# Patient Record
Sex: MALE | Race: WHITE | NOT HISPANIC OR LATINO | Employment: OTHER | URBAN - METROPOLITAN AREA
[De-identification: names, ages, dates, MRNs, and addresses within clinical notes are randomized per-mention and may not be internally consistent; named-entity substitution may affect disease eponyms.]

---

## 2017-03-31 ENCOUNTER — ALLSCRIPTS OFFICE VISIT (OUTPATIENT)
Dept: OTHER | Facility: OTHER | Age: 77
End: 2017-03-31

## 2017-10-06 ENCOUNTER — ALLSCRIPTS OFFICE VISIT (OUTPATIENT)
Dept: OTHER | Facility: OTHER | Age: 77
End: 2017-10-06

## 2017-10-07 NOTE — PROGRESS NOTES
Assessment  1  History of malignant melanoma of skin (V10 82) (Z85 820)    Plan  PMH: Malignant melanoma of upper limb    · Follow-up visit in 6 months Evaluation and Treatment  Follow-up  Status: Complete   Done: 56NIH8886   Ordered; For: PMH: Malignant melanoma of upper limb; Ordered By: Amy Dears Performed:  Due: 41TFQ3667; Last Updated By: Ki Contreras; 10/6/2017 9:28:28 AM    Discussion/Summary  Discussion Summary:   Patient is a 18-year-old male who presents today for a 6 month follow-up for a stage IIa ( pT3a, pN0) melanoma of the left shoulder diagnosed in January 2014  At that time he underwent a wide excision and sentinel node biopsy  We will see the patient back in 6 months or sooner if the need arises  Counseling Documentation With Imm: The patient was counseled regarding instructions for management,-impressions,-importance of compliance with treatment  Goals and Barriers: The patient has the current Goals:   Patient's Capacity to Self-Care: Patient is able to Self-Care  Medication SE Review and Pt Understands Tx: The treatment plan was reviewed with the patient/guardian  The patient/guardian understands and agrees with the treatment plan      Chief Complaint  Chief Complaint Free Text Note Form: Pt is here for his 6 month Melanoma follow-up states that where the Melanoma is located he notice pain and a scab  History of Present Illness  Diagnosis and Staging: Left shoulder melanoma stage II   Treatment History: wide excision/SLN bx Jan 2014   Current Therapy: Observation   Interval History: Patient presents today for a 6 month follow-up for left shoulder melanoma diagnosed in January 2014  He is a 18-year-old man with a previous stage II melanoma of the left shoulder      Review of Systems  Complete Male ROS Surg Onc:   Constitutional: The patient denies new or recent history of general fatigue, no recent weight loss, no change in appetite     Eyes: No complaints of visual problems, no scleral icterus  ENT: no complaints of ear pain, no hoarseness, no difficulty swallowing,-no tinnitus-and-no new masses in head, oral cavity, or neck  Cardiovascular: No complaints of chest pain, no palpitations, no ankle edema  Respiratory: shortness of breath-and-shortness of breath during exertion, but-No complaints of shortness of breath, no cough,-no cough-and-no wheezing  Gastrointestinal: No complaints of jaundice, no bloody stools, no pale stools  Genitourinary: No complaints of dysuria, no hematuria, no nocturia, no frequent urination, no urethral discharge  Musculoskeletal: No complaints of weakness, paralysis, joint stiffness or arthralgias,  Integumentary: No complaints of rash, no new lesions  Neurological: No complaints of convulsions, no seizures, no dizziness  Hematologic/Lymphatic: No complaints of easy bruising  Other Symptoms: pain and a scab at his Melanoma site  ROS Reviewed:   ROS reviewed  Active Problems  1  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)   2  Dyspnea and respiratory abnormality (786 09) (R06 00,R06 89)   3  Encounter for other plastic or reconstructive surgery following medical procedure or   healed injury (V51 8) (Z42 8)    Past Medical History  1  Encounter for other plastic or reconstructive surgery following medical procedure or   healed injury (V51 8) (Z42 8)   2  History of chronic sinusitis (V12 69) (Z87 09)   3  History of glaucoma (V12 49) (Z86 69)   4  History of kidney disease (V13 09) (Z87 448)   5  History of shortness of breath (V13 89) (Z87 898)   6  History of Malignant melanoma of upper limb (172 6) (C43 60)   7  History of Pulmonary Symptoms (786 9)   8  History of Renal cyst, acquired (593 2) (N28 1)   9  History of Tiring Easily    Surgical History  1  History of Biopsy Skin   2  History of Cataract Surgery   3  History of Excision Of Lesion Shoulders   · 01/14/14   4  History of Inguinal Hernia Repair   5  History of Removal Of Lesion   6  History of Repair Of Retinal Detachment   7  History of Rotator Cuff Repair   8  History of [place] Lymph Node Biopsy   · 01/14/14, left neck and axilla  Surgical History Reviewed: The surgical history was reviewed and updated today  Family History  Sister    1  Family history of chronic obstructive pulmonary disease (V17 6) (Z82 5)  Family History Reviewed: The family history was reviewed and updated today  Social History   · Being A Social Drinker   · Former smoker (J35 99) (W75 376)   · Occupation: Retired  Social History Reviewed: The social history was reviewed and updated today  The social history was reviewed and is unchanged  Current Meds   1  Aspir-Low 81 MG Oral Tablet Delayed Release; TAKE 1 TABLET DAILY; Therapy: (Recorded:16Mar2015) to Recorded   2  [employer] Vitamin D 2000 UNIT CAPS; take 1 capsule daily; Therapy: (Recorded:16Mar2015) to Recorded   3  Protonix 40 MG Oral Packet; TAKE 1 MG Daily; Therapy: (Recorded:16Mar2015) to Recorded  Medication List Reviewed: The medication list was reviewed and updated today  Allergies  1  Biaxin XL TB24    Vitals  Vital Signs    Recorded: 91YHA4599 09:10AM   Temperature 98 4 F   Heart Rate 78   Respiration 15   Systolic 546   Diastolic 78   Height 6 ft    Weight 223 lb    BMI Calculated 30 24   BSA Calculated 2 23   O2 Saturation 96     Physical Exam    Constitutional: General appearance: The Patient is well-developed, well-nourished male who appears his stated age in no acute distress  He is pleasant and talkative  HEENT: Sclerae are anicteric  -Mucous membranes are moist  -Neck is supple without adenopathy  No JVD  Chest: The lungs are clear to auscultation  Cardiac: Heart is regular rate  Abdomen: Abdomen is soft, nontender without masses  Extremities: There is no clubbing or cyanosis  -There is no edema  Neuro: Grossly nonfocal  -Gait is normal     Lymphatic: no evidence of cervical adenopathy bilaterally   -no evidence of axillary adenopathy bilaterally  Skin: Warm, anicteric  Additional Exam:  Left shoulder melanoma site well-healed without evidence of local or regional reoccurrence  No palpable left neck or left axillary lymphadenopathy noted  There is a small scab noted posterior to the incision site, which does not appear worrisome  End of Encounter Meds  1  Aspir-Low 81 MG Oral Tablet Delayed Release; TAKE 1 TABLET DAILY; Therapy: (Recorded:16Mar2015) to Recorded   2  CVS Vitamin D 2000 UNIT CAPS; take 1 capsule daily; Therapy: (Recorded:16Mar2015) to Recorded   3  Protonix 40 MG Oral Packet; TAKE 1 MG Daily;    Therapy: (Recorded:16Mar2015) to Recorded    Signatures   Electronically signed by : Kiley Dobson MD; Oct  6 2017  9:30AM EST                       (Author)

## 2018-01-13 VITALS
SYSTOLIC BLOOD PRESSURE: 128 MMHG | BODY MASS INDEX: 30.75 KG/M2 | WEIGHT: 227 LBS | OXYGEN SATURATION: 94 % | TEMPERATURE: 97.7 F | DIASTOLIC BLOOD PRESSURE: 80 MMHG | HEIGHT: 72 IN | RESPIRATION RATE: 14 BRPM | HEART RATE: 63 BPM

## 2018-01-14 VITALS
RESPIRATION RATE: 15 BRPM | OXYGEN SATURATION: 96 % | WEIGHT: 223 LBS | SYSTOLIC BLOOD PRESSURE: 122 MMHG | BODY MASS INDEX: 30.2 KG/M2 | DIASTOLIC BLOOD PRESSURE: 78 MMHG | HEART RATE: 78 BPM | TEMPERATURE: 98.4 F | HEIGHT: 72 IN

## 2018-04-06 ENCOUNTER — OFFICE VISIT (OUTPATIENT)
Dept: SURGICAL ONCOLOGY | Facility: CLINIC | Age: 78
End: 2018-04-06
Payer: COMMERCIAL

## 2018-04-06 VITALS
SYSTOLIC BLOOD PRESSURE: 120 MMHG | HEIGHT: 73 IN | DIASTOLIC BLOOD PRESSURE: 60 MMHG | WEIGHT: 218 LBS | TEMPERATURE: 98.5 F | HEART RATE: 60 BPM | RESPIRATION RATE: 14 BRPM | BODY MASS INDEX: 28.89 KG/M2

## 2018-04-06 DIAGNOSIS — C43.62 MALIGNANT MELANOMA OF LEFT SHOULDER (HCC): Primary | ICD-10-CM

## 2018-04-06 PROCEDURE — 99214 OFFICE O/P EST MOD 30 MIN: CPT | Performed by: SURGERY

## 2018-04-06 RX ORDER — RANITIDINE 150 MG/1
150 TABLET ORAL DAILY
COMMUNITY
End: 2020-12-02

## 2018-04-06 RX ORDER — ASPIRIN 81 MG/1
1 TABLET ORAL DAILY
COMMUNITY

## 2018-04-06 RX ORDER — PANTOPRAZOLE SODIUM 40 MG/1
1 GRANULE, DELAYED RELEASE ORAL DAILY
COMMUNITY
End: 2018-10-12 | Stop reason: HOSPADM

## 2018-04-06 RX ORDER — LEVOTHYROXINE SODIUM 0.07 MG/1
1 TABLET ORAL DAILY
COMMUNITY
Start: 2018-04-02

## 2018-04-06 NOTE — PROGRESS NOTES
Surgical Oncology Follow Up       [facility] SURGICAL ONCOLOGY [address] [address]    Sam Mitchell  1940  [id number]  [facility] SURGICAL ONCOLOGY [address] [address]    Chief Complaint   Patient presents with    Follow-up     6 MOS MELONOMA, PT has not issue at this time       Assessment/Plan:    No problem-specific Assessment & Plan notes found for this encounter  There are no diagnoses linked to this encounter  No history exists  History of Present Illness: Diagnosis and Staging: Left shoulder melanoma stage II   Treatment History: wide excision/SLN bx Jan 2014   Current Therapy: Observation     Interval History: Patient presents today for a 6 month follow-up for left shoulder melanoma diagnosed in January 2014  He is a 78-year-old man with a previous stage II melanoma of the left shoulder       Review of Systems:  Review of Systems   Constitutional: Negative  HENT: Negative  Eyes: Negative  Respiratory: Negative  Cardiovascular: Negative  Gastrointestinal: Negative  Endocrine: Negative  Genitourinary: Negative  Musculoskeletal: Negative  Allergic/Immunologic: Negative  Neurological: Negative  Hematological: Negative  Psychiatric/Behavioral: Negative  All other systems reviewed and are negative  There is no problem list on file for this patient  No past medical history on file  No past surgical history on file  No family history on file  Social History     Social History    Marital status: /Civil Union     Spouse name: N/A    Number of children: N/A    Years of education: N/A     Occupational History    Not on file       Social History Main Topics    Smoking status: Never Smoker    Smokeless tobacco: Never Used    Alcohol use Yes    Drug use: No    Sexual activity: Yes     Other Topics Concern    Not on file     Social History Narrative    No narrative on file       Current Outpatient Prescriptions:     ranitidine (ZANTAC) 150 mg tablet, Take 150 mg by mouth daily, Disp: , Rfl:     aspirin (ASPIR-LOW) 81 mg EC tablet, Take 1 tablet by mouth daily, Disp: , Rfl:     Cholecalciferol (CVS VITAMIN D) 2000 units CAPS, Take 1 capsule by mouth daily, Disp: , Rfl:     levothyroxine 75 mcg tablet, Take 1 tablet by mouth daily, Disp: , Rfl:     pantoprazole (PROTONIX) 40 mg, Take 1 mg by mouth daily, Disp: , Rfl:   Allergies   Allergen Reactions    Clarithromycin      Vitals:    04/06/18 0910   BP: 120/60   Pulse: 60   Resp: 14   Temp: 98 5 °F (36 9 °C)       Physical Exam   Constitutional: He is oriented to person, place, and time  He appears well-developed and well-nourished  Eyes: Conjunctivae and EOM are normal  Pupils are equal, round, and reactive to light  Neck: Normal range of motion  Neck supple  Pulmonary/Chest: Effort normal and breath sounds normal    Abdominal: Soft  Bowel sounds are normal    Musculoskeletal: Normal range of motion  Neurological: He is alert and oriented to person, place, and time  Skin: Skin is warm and dry  Left shoulder excision site well healed without evidence of recurrence  Cervical and axillary nodes negative  Psychiatric: He has a normal mood and affect  His behavior is normal  Judgment and thought content normal          Results:  Labs:  CBC, Coags, BMP, Mg, Phos     Imaging  No results found  I reviewed the above laboratory and imaging data  Discussion/Summary:  History of stage I melanoma, left shoulder, 4 years post resection  No evidence of disease recurrence  Follow-up in 6 months for surveillance per guidelines

## 2018-04-06 NOTE — LETTER
April 6, 2018     Eladia Villatoro MD  [address] [place] [address] 8[facility] [address]    Patient: Aaron Gallego   YOB: 1940   Date of Visit: 4/6/2018       Dear Dr Jethro Denver: Thank you for referring Mason Marcos to me for evaluation  Below are my notes for this consultation  If you have questions, please do not hesitate to call me  I look forward to following your patient along with you  Sincerely,        Emily Hoyt MD        CC: MD Roger Mathis DO Gilmer Proffer, MD Janas Lama, MD  4/6/2018  9:30 AM  Sign at close encounter     Surgical Oncology Follow Up       [address] [facility] SURGICAL ONCOLOGY [address]  1940  [id number]  [facility] SURGICAL ONCOLOGY [address] [address]    Chief Complaint   Patient presents with    Follow-up     6 MOS MELONOMA, PT has not issue at this time       Assessment/Plan:    No problem-specific Assessment & Plan notes found for this encounter  There are no diagnoses linked to this encounter  Advance Care Planning/Advance Directives:  Discussed and Did not discuss disease status, cancer treatment plans and/or cancer treatment goals and *** with the patient  No history exists  History of Present Illness: Diagnosis and Staging: Left shoulder melanoma stage II   Treatment History: wide excision/SLN bx Jan 2014   Current Therapy: Observation     Interval History: Patient presents today for a 6 month follow-up for left shoulder melanoma diagnosed in January 2014  He is a 60-year-old man with a previous stage II melanoma of the left shoulder       Review of Systems:  Review of Systems   Constitutional: Negative  HENT: Negative  Eyes: Negative  Respiratory: Negative  Cardiovascular: Negative  Gastrointestinal: Negative  Endocrine: Negative      Genitourinary: Negative  Musculoskeletal: Negative  Allergic/Immunologic: Negative  Neurological: Negative  Hematological: Negative  Psychiatric/Behavioral: Negative  All other systems reviewed and are negative  There is no problem list on file for this patient  No past medical history on file  No past surgical history on file  No family history on file  Social History     Social History    Marital status: /Civil Union     Spouse name: N/A    Number of children: N/A    Years of education: N/A     Occupational History    Not on file  Social History Main Topics    Smoking status: Never Smoker    Smokeless tobacco: Never Used    Alcohol use Yes    Drug use: No    Sexual activity: Yes     Other Topics Concern    Not on file     Social History Narrative    No narrative on file       Current Outpatient Prescriptions:     ranitidine (ZANTAC) 150 mg tablet, Take 150 mg by mouth daily, Disp: , Rfl:     aspirin (ASPIR-LOW) 81 mg EC tablet, Take 1 tablet by mouth daily, Disp: , Rfl:     Cholecalciferol (CVS VITAMIN D) 2000 units CAPS, Take 1 capsule by mouth daily, Disp: , Rfl:     levothyroxine 75 mcg tablet, Take 1 tablet by mouth daily, Disp: , Rfl:     pantoprazole (PROTONIX) 40 mg, Take 1 mg by mouth daily, Disp: , Rfl:   Allergies   Allergen Reactions    Clarithromycin      Vitals:    04/06/18 0910   BP: 120/60   Pulse: 60   Resp: 14   Temp: 98 5 °F (36 9 °C)       Physical Exam   Constitutional: He is oriented to person, place, and time  He appears well-developed and well-nourished  Eyes: Conjunctivae and EOM are normal  Pupils are equal, round, and reactive to light  Neck: Normal range of motion  Neck supple  Pulmonary/Chest: Effort normal and breath sounds normal    Abdominal: Soft  Bowel sounds are normal    Musculoskeletal: Normal range of motion  Neurological: He is alert and oriented to person, place, and time  Skin: Skin is warm and dry     Left shoulder excision site well healed without evidence of recurrence  Cervical and axillary nodes negative  Psychiatric: He has a normal mood and affect  His behavior is normal  Judgment and thought content normal          Results:  Labs:  CBC, Coags, BMP, Mg, Phos     Imaging  No results found  I reviewed the above laboratory and imaging data  Discussion/Summary:  History of stage I melanoma, left shoulder, 4 years post resection  No evidence of disease recurrence  Follow-up in 6 months for surveillance per guidelines

## 2018-04-06 NOTE — LETTER
April 6, 2018     Renita Rodríguez MD  [address] [place] [address]    Patient: Aki Vee   YOB: 1940   Date of Visit: 4/6/2018       Dear Dr Namrata Loco: Thank you for referring Deangelo Joe to me for evaluation  Below are my notes for this consultation  If you have questions, please do not hesitate to call me  I look forward to following your patient along with you  Sincerely,        Nasim Chase MD        CC: MD Chelsi Ross, MD Nasim Renee MD  4/6/2018  9:31 AM  Sign at close encounter     Surgical Oncology Follow Up       [address]  1940  [id number]  [facility]  [facility] [place]  [address] [id number]    Chief Complaint   Patient presents with    Follow-up     6 MOS MELONOMA, PT has not issue at this time       Assessment/Plan:    No problem-specific Assessment & Plan notes found for this encounter  There are no diagnoses linked to this encounter  No history exists  History of Present Illness: Diagnosis and Staging: Left shoulder melanoma stage II   Treatment History: wide excision/SLN bx Jan 2014   Current Therapy: Observation     Interval History: Patient presents today for a 6 month follow-up for left shoulder melanoma diagnosed in January 2014  He is a 60-year-old man with a previous stage II melanoma of the left shoulder       Review of Systems:  Review of Systems   Constitutional: Negative  HENT: Negative  Eyes: Negative  Respiratory: Negative  Cardiovascular: Negative  Gastrointestinal: Negative  Endocrine: Negative  Genitourinary: Negative  Musculoskeletal: Negative  Allergic/Immunologic: Negative  Neurological: Negative  Hematological: Negative  Psychiatric/Behavioral: Negative      All other systems reviewed and are negative  There is no problem list on file for this patient  No past medical history on file  No past surgical history on file  No family history on file  Social History     Social History    Marital status: /Civil Union     Spouse name: N/A    Number of children: N/A    Years of education: N/A     Occupational History    Not on file  Social History Main Topics    Smoking status: Never Smoker    Smokeless tobacco: Never Used    Alcohol use Yes    Drug use: No    Sexual activity: Yes     Other Topics Concern    Not on file     Social History Narrative    No narrative on file       Current Outpatient Prescriptions:     ranitidine (ZANTAC) 150 mg tablet, Take 150 mg by mouth daily, Disp: , Rfl:     aspirin (ASPIR-LOW) 81 mg EC tablet, Take 1 tablet by mouth daily, Disp: , Rfl:     Cholecalciferol (CVS VITAMIN D) 2000 units CAPS, Take 1 capsule by mouth daily, Disp: , Rfl:     levothyroxine 75 mcg tablet, Take 1 tablet by mouth daily, Disp: , Rfl:     pantoprazole (PROTONIX) 40 mg, Take 1 mg by mouth daily, Disp: , Rfl:   Allergies   Allergen Reactions    Clarithromycin      Vitals:    04/06/18 0910   BP: 120/60   Pulse: 60   Resp: 14   Temp: 98 5 °F (36 9 °C)       Physical Exam   Constitutional: He is oriented to person, place, and time  He appears well-developed and well-nourished  Eyes: Conjunctivae and EOM are normal  Pupils are equal, round, and reactive to light  Neck: Normal range of motion  Neck supple  Pulmonary/Chest: Effort normal and breath sounds normal    Abdominal: Soft  Bowel sounds are normal    Musculoskeletal: Normal range of motion  Neurological: He is alert and oriented to person, place, and time  Skin: Skin is warm and dry  Left shoulder excision site well healed without evidence of recurrence  Cervical and axillary nodes negative  Psychiatric: He has a normal mood and affect   His behavior is normal  Judgment and thought content normal          Results:  Labs:  CBC, Coags, BMP, Mg, Phos     Imaging  No results found  I reviewed the above laboratory and imaging data  Discussion/Summary:  History of stage I melanoma, left shoulder, 4 years post resection  No evidence of disease recurrence  Follow-up in 6 months for surveillance per guidelines

## 2018-05-29 LAB
BACTERIA UR QL AUTO: ABNORMAL /HPF
BILIRUB UR QL STRIP: NEGATIVE
CLARITY UR: CLEAR
COLOR UR: YELLOW
GLUCOSE UR STRIP-MCNC: NEGATIVE MG/DL
HGB UR QL STRIP.AUTO: NEGATIVE
KETONES UR STRIP-MCNC: NEGATIVE MG/DL
LEUKOCYTE ESTERASE UR QL STRIP: ABNORMAL
MUCUS THREADS (HISTORICAL): ABNORMAL
NITRITE UR QL STRIP: NEGATIVE
NON-SQ EPI CELLS URNS QL MICRO: ABNORMAL /LPF
PH UR STRIP.AUTO: 6 [PH] (ref 4.5–8)
PROSTATE SPECIFIC ANTIGEN FREE (HISTORICAL): 1.46 NG/ML (ref 0.2–4.9)
PROSTATE SPECIFIC ANTIGEN PERCENT FREE (HISTORICAL): 24.8 %
PROT UR STRIP-MCNC: NEGATIVE MG/DL
PSA (HISTORICAL): 5.9 NG/ML (ref 0–4)
RBC #/AREA URNS AUTO: ABNORMAL /HPF
SP GR UR STRIP.AUTO: 1.01 (ref 1–1.03)
UROBILINOGEN UR QL STRIP.AUTO: 0.2 EU/DL (ref 0.2–8)
WBC #/AREA URNS AUTO: ABNORMAL /HPF

## 2018-10-11 ENCOUNTER — TRANSCRIBE ORDERS (OUTPATIENT)
Dept: ADMINISTRATIVE | Facility: HOSPITAL | Age: 78
End: 2018-10-11

## 2018-10-11 ENCOUNTER — APPOINTMENT (OUTPATIENT)
Dept: LAB | Facility: HOSPITAL | Age: 78
End: 2018-10-11
Attending: INTERNAL MEDICINE
Payer: COMMERCIAL

## 2018-10-11 DIAGNOSIS — E78.5 HYPERLIPIDEMIA, UNSPECIFIED HYPERLIPIDEMIA TYPE: ICD-10-CM

## 2018-10-11 DIAGNOSIS — E55.9 MILD VITAMIN D DEFICIENCY: ICD-10-CM

## 2018-10-11 DIAGNOSIS — R53.83 FATIGUE, UNSPECIFIED TYPE: ICD-10-CM

## 2018-10-11 DIAGNOSIS — Z79.899 LONG TERM USE OF DRUG: ICD-10-CM

## 2018-10-11 DIAGNOSIS — D64.9 ANEMIA, UNSPECIFIED TYPE: ICD-10-CM

## 2018-10-11 DIAGNOSIS — E03.9 HYPOTHYROIDISM, UNSPECIFIED TYPE: ICD-10-CM

## 2018-10-11 DIAGNOSIS — R53.83 FATIGUE, UNSPECIFIED TYPE: Primary | ICD-10-CM

## 2018-10-11 LAB
25(OH)D3 SERPL-MCNC: 40.4 NG/ML (ref 30–100)
ALBUMIN SERPL BCP-MCNC: 3.9 G/DL (ref 3.5–5.7)
ALP SERPL-CCNC: 63 U/L (ref 55–165)
ALT SERPL W P-5'-P-CCNC: 14 U/L (ref 7–52)
ANION GAP SERPL CALCULATED.3IONS-SCNC: 8 MMOL/L (ref 4–13)
AST SERPL W P-5'-P-CCNC: 14 U/L (ref 13–39)
BILIRUB SERPL-MCNC: 0.6 MG/DL (ref 0.2–1)
BUN SERPL-MCNC: 23 MG/DL (ref 7–25)
CALCIUM SERPL-MCNC: 9 MG/DL (ref 8.6–10.5)
CHLORIDE SERPL-SCNC: 105 MMOL/L (ref 98–107)
CO2 SERPL-SCNC: 30 MMOL/L (ref 21–31)
CREAT SERPL-MCNC: 1.23 MG/DL (ref 0.7–1.3)
ERYTHROCYTE [DISTWIDTH] IN BLOOD BY AUTOMATED COUNT: 13.4 % (ref 11.6–15.1)
FERRITIN SERPL-MCNC: 120 NG/ML (ref 8–388)
FOLATE SERPL-MCNC: 10.5 NG/ML (ref 3.1–17.5)
GFR SERPL CREATININE-BSD FRML MDRD: 56 ML/MIN/1.73SQ M
GLUCOSE P FAST SERPL-MCNC: 93 MG/DL (ref 65–99)
HCT VFR BLD AUTO: 42.7 % (ref 42–52)
HGB BLD-MCNC: 14.4 G/DL (ref 12–17)
IRON SATN MFR SERPL: 37 %
IRON SERPL-MCNC: 89 UG/DL (ref 65–175)
LDLC SERPL DIRECT ASSAY-MCNC: 107 MG/DL (ref 75–193)
MCH RBC QN AUTO: 30.3 PG (ref 26.8–34.3)
MCHC RBC AUTO-ENTMCNC: 33.7 G/DL (ref 31.4–37.4)
MCV RBC AUTO: 90 FL (ref 82–98)
PLATELET # BLD AUTO: 187 THOUSANDS/UL (ref 149–390)
PMV BLD AUTO: 8.3 FL (ref 8.9–12.7)
POTASSIUM SERPL-SCNC: 4.4 MMOL/L (ref 3.5–5.5)
PROT SERPL-MCNC: 6.6 G/DL (ref 6.4–8.9)
RBC # BLD AUTO: 4.75 MILLION/UL (ref 3.88–5.62)
RETICS # AUTO: NORMAL 10*3/UL (ref 14356–105094)
RETICS # CALC: 1.07 % (ref 0.37–1.87)
SODIUM SERPL-SCNC: 143 MMOL/L (ref 134–143)
T4 FREE SERPL-MCNC: 1.37 NG/DL (ref 0.76–1.46)
TIBC SERPL-MCNC: 242 UG/DL (ref 250–450)
TRIGL SERPL-MCNC: 93 MG/DL (ref 44–166)
TSH SERPL DL<=0.05 MIU/L-ACNC: 1.22 UIU/ML (ref 0.45–5.33)
VIT B12 SERPL-MCNC: 227 PG/ML (ref 100–900)
WBC # BLD AUTO: 3.9 THOUSAND/UL (ref 4.31–10.16)

## 2018-10-11 PROCEDURE — 82728 ASSAY OF FERRITIN: CPT

## 2018-10-11 PROCEDURE — 82607 VITAMIN B-12: CPT

## 2018-10-11 PROCEDURE — 36415 COLL VENOUS BLD VENIPUNCTURE: CPT

## 2018-10-11 PROCEDURE — 84439 ASSAY OF FREE THYROXINE: CPT

## 2018-10-11 PROCEDURE — 83540 ASSAY OF IRON: CPT

## 2018-10-11 PROCEDURE — 82747 ASSAY OF FOLIC ACID RBC: CPT

## 2018-10-11 PROCEDURE — 80053 COMPREHEN METABOLIC PANEL: CPT

## 2018-10-11 PROCEDURE — 86618 LYME DISEASE ANTIBODY: CPT

## 2018-10-11 PROCEDURE — 85027 COMPLETE CBC AUTOMATED: CPT

## 2018-10-11 PROCEDURE — 85045 AUTOMATED RETICULOCYTE COUNT: CPT

## 2018-10-11 PROCEDURE — 83550 IRON BINDING TEST: CPT

## 2018-10-11 PROCEDURE — 84443 ASSAY THYROID STIM HORMONE: CPT

## 2018-10-11 PROCEDURE — 82306 VITAMIN D 25 HYDROXY: CPT

## 2018-10-11 PROCEDURE — 83721 ASSAY OF BLOOD LIPOPROTEIN: CPT

## 2018-10-11 PROCEDURE — 82746 ASSAY OF FOLIC ACID SERUM: CPT

## 2018-10-11 PROCEDURE — 84478 ASSAY OF TRIGLYCERIDES: CPT

## 2018-10-12 ENCOUNTER — OFFICE VISIT (OUTPATIENT)
Dept: SURGICAL ONCOLOGY | Facility: CLINIC | Age: 78
End: 2018-10-12
Payer: COMMERCIAL

## 2018-10-12 VITALS
DIASTOLIC BLOOD PRESSURE: 90 MMHG | BODY MASS INDEX: 29.95 KG/M2 | HEART RATE: 62 BPM | WEIGHT: 226 LBS | TEMPERATURE: 98.1 F | SYSTOLIC BLOOD PRESSURE: 130 MMHG | RESPIRATION RATE: 16 BRPM | HEIGHT: 73 IN

## 2018-10-12 DIAGNOSIS — C43.62 MALIGNANT MELANOMA OF LEFT SHOULDER (HCC): Primary | ICD-10-CM

## 2018-10-12 LAB
B BURGDOR IGG SER IA-ACNC: 0.31
B BURGDOR IGM SER IA-ACNC: 0.25

## 2018-10-12 PROCEDURE — 99214 OFFICE O/P EST MOD 30 MIN: CPT | Performed by: SURGERY

## 2018-10-12 NOTE — LETTER
October 12, 2018     Lorelei Jean MD  [address] [place] [address] 8[facility] [address]    Patient: Lesley Love   YOB: 1940   Date of Visit: 10/12/2018       Dear Dr Juvencio Buenrostro: Thank you for referring Bhavana Winkler to me for evaluation  Below are my notes for this consultation  If you have questions, please do not hesitate to call me  I look forward to following your patient along with you  Sincerely,        Jaspreet Liao MD        CC: MD Claus Oliveira DO Michela Beal, MD Lum Malling, MD  10/12/2018  2:37 PM  Sign at close encounter     Surgical Oncology Follow Up       [address]  1940  [id number]  [address] [address]    Chief Complaint   Patient presents with    Follow-up     Patient is here for a 6 month melanoma follow up  Assessment/Plan:    No problem-specific Assessment & Plan notes found for this encounter  There are no diagnoses linked to this encounter  Advance Care Planning/Advance Directives:  Discussed disease status, cancer treatment plans and/or cancer treatment goals with the patient  No history exists  History of Present Illness: Diagnosis and Staging: Left shoulder melanoma stage II   Treatment History: wide excision/SLN bx Jan 2014   Current Therapy: Observation      Interval History: Patient is a 52-year-old man with a previous stage II melanoma of the left shoulder  He presents today for a 6 month follow-up for left shoulder melanoma diagnosed in January 2014         Review of Systems:  Review of Systems   Constitutional: Negative  HENT: Negative  Eyes: Negative  Respiratory: Negative  Cardiovascular: Negative  Gastrointestinal: Negative  Endocrine: Negative  Genitourinary: Negative  Musculoskeletal: Negative  Skin: Negative  Allergic/Immunologic: Negative  Neurological: Negative  Hematological: Negative  Psychiatric/Behavioral: Negative  Patient Active Problem List   Diagnosis    Malignant melanoma of left shoulder ([address] )    Diaphragm paralysis    Dyspnea and respiratory abnormality     No past medical history on file  No past surgical history on file  No family history on file  Social History     Social History    Marital status: /Civil Union     Spouse name: N/A    Number of children: N/A    Years of education: N/A     Occupational History    Not on file  Social History Main Topics    Smoking status: Never Smoker    Smokeless tobacco: Never Used    Alcohol use Yes    Drug use: No    Sexual activity: Yes     Other Topics Concern    Not on file     Social History Narrative    No narrative on file       Current Outpatient Prescriptions:     aspirin (ASPIR-LOW) 81 mg EC tablet, Take 1 tablet by mouth daily, Disp: , Rfl:     Cholecalciferol (CVS VITAMIN D) 2000 units CAPS, Take 1 capsule by mouth daily, Disp: , Rfl:     levothyroxine 75 mcg tablet, Take 1 tablet by mouth daily, Disp: , Rfl:     ranitidine (ZANTAC) 150 mg tablet, Take 150 mg by mouth daily, Disp: , Rfl:   Allergies   Allergen Reactions    Clarithromycin Other (See Comments)     biaxin     Vitals:    10/12/18 1348   BP: 130/90   Pulse: 62   Resp: 16   Temp: 98 1 °F (36 7 °C)       Physical Exam   Constitutional: He is oriented to person, place, and time  He appears well-developed and well-nourished  HENT:   Head: Normocephalic and atraumatic  Right Ear: External ear normal    Left Ear: External ear normal    Eyes: Pupils are equal, round, and reactive to light  Conjunctivae are normal    Neck: Normal range of motion  Neck supple  Cardiovascular: Normal rate and regular rhythm  Pulmonary/Chest: Effort normal and breath sounds normal    Abdominal: Soft   Bowel sounds are normal    Musculoskeletal: Normal range of motion  Neurological: He is alert and oriented to person, place, and time  Skin: Skin is warm and dry  Well-healed left shoulder excision site without evidence of recurrence visible or palpable  Left neck and axilla clinically negative  Psychiatric: He has a normal mood and affect  His behavior is normal  Judgment and thought content normal          Results:  Labs:  none    Imaging  No results found  I reviewed the above laboratory and imaging data  Discussion/Summary:  History of stage II melanoma left shoulder status post excision 4 and half years ago  He is presently disease free  Plan on 1 more visit in early 2019 which will be is 5 year anniversary  He will continue lifelong derm surveillance

## 2018-10-12 NOTE — PROGRESS NOTES
Surgical Oncology Follow Up       [address]  [facility] [place]  Lloyd Rodríguez  1940  [phone]  [place]  [facility] [place] [id number]    Chief Complaint   Patient presents with    Follow-up     Patient is here for a 6 month melanoma follow up  Assessment/Plan:    No problem-specific Assessment & Plan notes found for this encounter  There are no diagnoses linked to this encounter  Advance Care Planning/Advance Directives:  Discussed disease status, cancer treatment plans and/or cancer treatment goals with the patient  No history exists  History of Present Illness: Diagnosis and Staging: Left shoulder melanoma stage II   Treatment History: wide excision/SLN bx Jan 2014   Current Therapy: Observation      Interval History: Patient is a 55-year-old man with a previous stage II melanoma of the left shoulder  He presents today for a 6 month follow-up for left shoulder melanoma diagnosed in January 2014         Review of Systems:  Review of Systems   Constitutional: Negative  HENT: Negative  Eyes: Negative  Respiratory: Negative  Cardiovascular: Negative  Gastrointestinal: Negative  Endocrine: Negative  Genitourinary: Negative  Musculoskeletal: Negative  Skin: Negative  Allergic/Immunologic: Negative  Neurological: Negative  Hematological: Negative  Psychiatric/Behavioral: Negative  Patient Active Problem List   Diagnosis    Malignant melanoma of left shoulder ([address] )    Diaphragm paralysis    Dyspnea and respiratory abnormality     No past medical history on file  No past surgical history on file  No family history on file    Social History     Social History    Marital status: /Civil Union     Spouse name: N/A    Number of children: N/A    Years of education: N/A     Occupational History    Not on file  Social History Main Topics    Smoking status: Never Smoker    Smokeless tobacco: Never Used    Alcohol use Yes    Drug use: No    Sexual activity: Yes     Other Topics Concern    Not on file     Social History Narrative    No narrative on file       Current Outpatient Prescriptions:     aspirin (ASPIR-LOW) 81 mg EC tablet, Take 1 tablet by mouth daily, Disp: , Rfl:     Cholecalciferol (CVS VITAMIN D) 2000 units CAPS, Take 1 capsule by mouth daily, Disp: , Rfl:     levothyroxine 75 mcg tablet, Take 1 tablet by mouth daily, Disp: , Rfl:     ranitidine (ZANTAC) 150 mg tablet, Take 150 mg by mouth daily, Disp: , Rfl:   Allergies   Allergen Reactions    Clarithromycin Other (See Comments)     biaxin     Vitals:    10/12/18 1348   BP: 130/90   Pulse: 62   Resp: 16   Temp: 98 1 °F (36 7 °C)       Physical Exam   Constitutional: He is oriented to person, place, and time  He appears well-developed and well-nourished  HENT:   Head: Normocephalic and atraumatic  Right Ear: External ear normal    Left Ear: External ear normal    Eyes: Pupils are equal, round, and reactive to light  Conjunctivae are normal    Neck: Normal range of motion  Neck supple  Cardiovascular: Normal rate and regular rhythm  Pulmonary/Chest: Effort normal and breath sounds normal    Abdominal: Soft  Bowel sounds are normal    Musculoskeletal: Normal range of motion  Neurological: He is alert and oriented to person, place, and time  Skin: Skin is warm and dry  Well-healed left shoulder excision site without evidence of recurrence visible or palpable  Left neck and axilla clinically negative  Psychiatric: He has a normal mood and affect  His behavior is normal  Judgment and thought content normal          Results:  Labs:  none    Imaging  No results found  I reviewed the above laboratory and imaging data      Discussion/Summary:  History of stage II melanoma left shoulder status post excision 4 and half years ago  He is presently disease free  Plan on 1 more visit in early 2019 which will be is 5 year anniversary  He will continue lifelong derm surveillance

## 2018-10-14 LAB
FOLATE BLD-MCNC: 349 NG/ML
FOLATE RBC-MCNC: 855 NG/ML
HCT VFR BLD AUTO: 40.8 % (ref 37.5–51)

## 2019-04-16 PROBLEM — Z08 ENCOUNTER FOR FOLLOW-UP SURVEILLANCE OF MELANOMA: Status: ACTIVE | Noted: 2019-04-16

## 2019-04-16 PROBLEM — Z85.820 PERSONAL HISTORY OF MALIGNANT MELANOMA: Status: ACTIVE | Noted: 2018-04-06

## 2019-04-16 PROBLEM — Z85.820 ENCOUNTER FOR FOLLOW-UP SURVEILLANCE OF MELANOMA: Status: ACTIVE | Noted: 2019-04-16

## 2019-04-19 ENCOUNTER — OFFICE VISIT (OUTPATIENT)
Dept: SURGICAL ONCOLOGY | Facility: CLINIC | Age: 79
End: 2019-04-19
Payer: COMMERCIAL

## 2019-04-19 VITALS
HEART RATE: 56 BPM | WEIGHT: 223 LBS | RESPIRATION RATE: 16 BRPM | HEIGHT: 73 IN | SYSTOLIC BLOOD PRESSURE: 120 MMHG | DIASTOLIC BLOOD PRESSURE: 71 MMHG | TEMPERATURE: 98.2 F | BODY MASS INDEX: 29.55 KG/M2

## 2019-04-19 DIAGNOSIS — Z08 ENCOUNTER FOR FOLLOW-UP SURVEILLANCE OF MELANOMA: Primary | ICD-10-CM

## 2019-04-19 DIAGNOSIS — Z85.820 ENCOUNTER FOR FOLLOW-UP SURVEILLANCE OF MELANOMA: Primary | ICD-10-CM

## 2019-04-19 DIAGNOSIS — Z85.820 PERSONAL HISTORY OF MALIGNANT MELANOMA: ICD-10-CM

## 2019-04-19 PROCEDURE — 99213 OFFICE O/P EST LOW 20 MIN: CPT | Performed by: SURGERY

## 2019-05-30 ENCOUNTER — APPOINTMENT (OUTPATIENT)
Dept: LAB | Facility: HOSPITAL | Age: 79
End: 2019-05-30
Attending: UROLOGY
Payer: COMMERCIAL

## 2019-05-30 ENCOUNTER — TRANSCRIBE ORDERS (OUTPATIENT)
Dept: ADMINISTRATIVE | Facility: HOSPITAL | Age: 79
End: 2019-05-30

## 2019-05-30 DIAGNOSIS — R97.20 ELEVATED PSA: Primary | ICD-10-CM

## 2019-05-30 DIAGNOSIS — R97.20 ELEVATED PSA: ICD-10-CM

## 2019-05-30 PROCEDURE — 84154 ASSAY OF PSA FREE: CPT

## 2019-05-30 PROCEDURE — 84153 ASSAY OF PSA TOTAL: CPT

## 2019-05-30 PROCEDURE — 36415 COLL VENOUS BLD VENIPUNCTURE: CPT

## 2019-05-31 LAB
PSA FREE MFR SERPL: 25.8 %
PSA FREE SERPL-MCNC: 1.52 NG/ML
PSA SERPL-MCNC: 5.9 NG/ML (ref 0–4)

## 2019-09-03 ENCOUNTER — APPOINTMENT (OUTPATIENT)
Dept: LAB | Facility: HOSPITAL | Age: 79
End: 2019-09-03
Attending: INTERNAL MEDICINE
Payer: COMMERCIAL

## 2019-09-03 ENCOUNTER — TRANSCRIBE ORDERS (OUTPATIENT)
Dept: LAB | Facility: HOSPITAL | Age: 79
End: 2019-09-03

## 2019-09-03 DIAGNOSIS — I10 HYPERTENSION, UNSPECIFIED TYPE: ICD-10-CM

## 2019-09-03 DIAGNOSIS — R60.9 EDEMA, UNSPECIFIED TYPE: ICD-10-CM

## 2019-09-03 DIAGNOSIS — E55.9 VITAMIN D DEFICIENCY: ICD-10-CM

## 2019-09-03 DIAGNOSIS — Z79.899 ENCOUNTER FOR LONG-TERM (CURRENT) USE OF OTHER MEDICATIONS: Primary | ICD-10-CM

## 2019-09-03 DIAGNOSIS — R63.5 WEIGHT GAIN: ICD-10-CM

## 2019-09-03 DIAGNOSIS — Z79.899 ENCOUNTER FOR LONG-TERM (CURRENT) USE OF OTHER MEDICATIONS: ICD-10-CM

## 2019-09-03 LAB
25(OH)D3 SERPL-MCNC: 22.6 NG/ML (ref 30–100)
ALBUMIN SERPL BCP-MCNC: 4 G/DL (ref 3.5–5.7)
ALP SERPL-CCNC: 53 U/L (ref 55–165)
ALT SERPL W P-5'-P-CCNC: 17 U/L (ref 7–52)
ANION GAP SERPL CALCULATED.3IONS-SCNC: 6 MMOL/L (ref 4–13)
AST SERPL W P-5'-P-CCNC: 15 U/L (ref 13–39)
BILIRUB SERPL-MCNC: 0.5 MG/DL (ref 0.2–1)
BNP SERPL-MCNC: 54 PG/ML (ref 1–100)
BUN SERPL-MCNC: 28 MG/DL (ref 7–25)
CALCIUM SERPL-MCNC: 9.2 MG/DL (ref 8.6–10.5)
CHLORIDE SERPL-SCNC: 105 MMOL/L (ref 98–107)
CHOLEST SERPL-MCNC: 156 MG/DL (ref 0–200)
CO2 SERPL-SCNC: 30 MMOL/L (ref 21–31)
CREAT SERPL-MCNC: 1.38 MG/DL (ref 0.7–1.3)
ERYTHROCYTE [DISTWIDTH] IN BLOOD BY AUTOMATED COUNT: 13.4 % (ref 11.5–14.5)
EST. AVERAGE GLUCOSE BLD GHB EST-MCNC: 117 MG/DL
GFR SERPL CREATININE-BSD FRML MDRD: 49 ML/MIN/1.73SQ M
GLUCOSE P FAST SERPL-MCNC: 97 MG/DL (ref 65–99)
HBA1C MFR BLD: 5.7 % (ref 4.2–6.3)
HCT VFR BLD AUTO: 43.7 % (ref 42–47)
HDLC SERPL-MCNC: 43 MG/DL (ref 40–60)
HGB BLD-MCNC: 14.6 G/DL (ref 14–18)
LDLC SERPL CALC-MCNC: 96 MG/DL (ref 0–100)
MCH RBC QN AUTO: 30.4 PG (ref 26–34)
MCHC RBC AUTO-ENTMCNC: 33.4 G/DL (ref 31–37)
MCV RBC AUTO: 91 FL (ref 81–99)
NONHDLC SERPL-MCNC: 113 MG/DL
PLATELET # BLD AUTO: 158 THOUSANDS/UL (ref 149–390)
PMV BLD AUTO: 8.5 FL (ref 8.6–11.7)
POTASSIUM SERPL-SCNC: 4.7 MMOL/L (ref 3.5–5.5)
PROT SERPL-MCNC: 6.5 G/DL (ref 6.4–8.9)
RBC # BLD AUTO: 4.8 MILLION/UL (ref 4.3–5.9)
SODIUM SERPL-SCNC: 141 MMOL/L (ref 134–143)
T4 FREE SERPL-MCNC: 1.02 NG/DL (ref 0.76–1.46)
TRIGL SERPL-MCNC: 85 MG/DL (ref 44–166)
TSH SERPL DL<=0.05 MIU/L-ACNC: 2.6 UIU/ML (ref 0.45–5.33)
WBC # BLD AUTO: 3.6 THOUSAND/UL (ref 4.8–10.8)

## 2019-09-03 PROCEDURE — 80061 LIPID PANEL: CPT

## 2019-09-03 PROCEDURE — 84443 ASSAY THYROID STIM HORMONE: CPT

## 2019-09-03 PROCEDURE — 83036 HEMOGLOBIN GLYCOSYLATED A1C: CPT

## 2019-09-03 PROCEDURE — 80053 COMPREHEN METABOLIC PANEL: CPT

## 2019-09-03 PROCEDURE — 83880 ASSAY OF NATRIURETIC PEPTIDE: CPT

## 2019-09-03 PROCEDURE — 36415 COLL VENOUS BLD VENIPUNCTURE: CPT

## 2019-09-03 PROCEDURE — 84439 ASSAY OF FREE THYROXINE: CPT

## 2019-09-03 PROCEDURE — 85027 COMPLETE CBC AUTOMATED: CPT

## 2019-09-03 PROCEDURE — 82306 VITAMIN D 25 HYDROXY: CPT

## 2019-09-10 ENCOUNTER — TRANSCRIBE ORDERS (OUTPATIENT)
Dept: ADMINISTRATIVE | Facility: HOSPITAL | Age: 79
End: 2019-09-10

## 2019-09-10 DIAGNOSIS — R00.1 BRADYCARDIA: Primary | ICD-10-CM

## 2019-10-01 ENCOUNTER — HOSPITAL ENCOUNTER (OUTPATIENT)
Dept: NON INVASIVE DIAGNOSTICS | Facility: CLINIC | Age: 79
Discharge: HOME | End: 2019-10-01
Payer: COMMERCIAL

## 2019-10-01 DIAGNOSIS — R00.1 BRADYCARDIA: ICD-10-CM

## 2019-10-01 PROCEDURE — 93225 XTRNL ECG REC<48 HRS REC: CPT

## 2019-10-01 PROCEDURE — 93226 XTRNL ECG REC<48 HR SCAN A/R: CPT

## 2019-10-08 PROCEDURE — 93227 XTRNL ECG REC<48 HR R&I: CPT | Performed by: INTERNAL MEDICINE

## 2020-03-11 ENCOUNTER — TRANSCRIBE ORDERS (OUTPATIENT)
Dept: LAB | Facility: HOSPITAL | Age: 80
End: 2020-03-11

## 2020-03-11 ENCOUNTER — APPOINTMENT (OUTPATIENT)
Dept: LAB | Facility: HOSPITAL | Age: 80
End: 2020-03-11
Attending: INTERNAL MEDICINE
Payer: COMMERCIAL

## 2020-03-11 DIAGNOSIS — D64.9 ANEMIA, UNSPECIFIED TYPE: ICD-10-CM

## 2020-03-11 DIAGNOSIS — E03.9 HYPOTHYROIDISM, UNSPECIFIED TYPE: ICD-10-CM

## 2020-03-11 DIAGNOSIS — I10 HYPERTENSION, UNSPECIFIED TYPE: Primary | ICD-10-CM

## 2020-03-11 DIAGNOSIS — E55.9 VITAMIN D DEFICIENCY: ICD-10-CM

## 2020-03-11 DIAGNOSIS — R60.9 EDEMA, UNSPECIFIED TYPE: ICD-10-CM

## 2020-03-11 DIAGNOSIS — R73.9 HYPERGLYCEMIA: ICD-10-CM

## 2020-03-11 DIAGNOSIS — I10 HYPERTENSION, UNSPECIFIED TYPE: ICD-10-CM

## 2020-03-11 LAB
25(OH)D3 SERPL-MCNC: 51.1 NG/ML (ref 30–100)
ALBUMIN SERPL BCP-MCNC: 4 G/DL (ref 3.5–5.7)
ALP SERPL-CCNC: 58 U/L (ref 55–165)
ALT SERPL W P-5'-P-CCNC: 18 U/L (ref 7–52)
ANION GAP SERPL CALCULATED.3IONS-SCNC: 5 MMOL/L (ref 4–13)
AST SERPL W P-5'-P-CCNC: 15 U/L (ref 13–39)
BILIRUB SERPL-MCNC: 0.6 MG/DL (ref 0.2–1)
BNP SERPL-MCNC: 44 PG/ML (ref 1–100)
BUN SERPL-MCNC: 29 MG/DL (ref 7–25)
CALCIUM SERPL-MCNC: 9.4 MG/DL (ref 8.6–10.5)
CHLORIDE SERPL-SCNC: 105 MMOL/L (ref 98–107)
CHOLEST SERPL-MCNC: 174 MG/DL (ref 0–200)
CO2 SERPL-SCNC: 32 MMOL/L (ref 21–31)
CREAT SERPL-MCNC: 1.41 MG/DL (ref 0.7–1.3)
ERYTHROCYTE [DISTWIDTH] IN BLOOD BY AUTOMATED COUNT: 13.4 % (ref 11.5–14.5)
EST. AVERAGE GLUCOSE BLD GHB EST-MCNC: 111 MG/DL
FERRITIN SERPL-MCNC: 103 NG/ML (ref 8–388)
FOLATE SERPL-MCNC: 7.7 NG/ML (ref 3.1–17.5)
GFR SERPL CREATININE-BSD FRML MDRD: 47 ML/MIN/1.73SQ M
GLUCOSE P FAST SERPL-MCNC: 95 MG/DL (ref 65–99)
HBA1C MFR BLD: 5.5 %
HCT VFR BLD AUTO: 45.7 % (ref 42–47)
HDLC SERPL-MCNC: 44 MG/DL
HGB BLD-MCNC: 15.1 G/DL (ref 14–18)
IRON SATN MFR SERPL: 39 %
IRON SERPL-MCNC: 107 UG/DL (ref 65–175)
LDLC SERPL CALC-MCNC: 113 MG/DL (ref 0–100)
MCH RBC QN AUTO: 30.5 PG (ref 26–34)
MCHC RBC AUTO-ENTMCNC: 33 G/DL (ref 31–37)
MCV RBC AUTO: 93 FL (ref 81–99)
NONHDLC SERPL-MCNC: 130 MG/DL
PLATELET # BLD AUTO: 176 THOUSANDS/UL (ref 149–390)
PMV BLD AUTO: 8.3 FL (ref 8.6–11.7)
POTASSIUM SERPL-SCNC: 5.1 MMOL/L (ref 3.5–5.5)
PROT SERPL-MCNC: 6.7 G/DL (ref 6.4–8.9)
PSA SERPL-MCNC: 5.6 NG/ML (ref 0–4)
RBC # BLD AUTO: 4.95 MILLION/UL (ref 4.3–5.9)
SODIUM SERPL-SCNC: 142 MMOL/L (ref 134–143)
T4 FREE SERPL-MCNC: 1.18 NG/DL (ref 0.76–1.46)
TIBC SERPL-MCNC: 272 UG/DL (ref 250–450)
TRIGL SERPL-MCNC: 86 MG/DL (ref 44–166)
TSH SERPL DL<=0.05 MIU/L-ACNC: 2.51 UIU/ML (ref 0.45–5.33)
WBC # BLD AUTO: 3.5 THOUSAND/UL (ref 4.8–10.8)

## 2020-03-11 PROCEDURE — 82306 VITAMIN D 25 HYDROXY: CPT

## 2020-03-11 PROCEDURE — 80053 COMPREHEN METABOLIC PANEL: CPT

## 2020-03-11 PROCEDURE — 84439 ASSAY OF FREE THYROXINE: CPT

## 2020-03-11 PROCEDURE — 80061 LIPID PANEL: CPT

## 2020-03-11 PROCEDURE — 83550 IRON BINDING TEST: CPT

## 2020-03-11 PROCEDURE — 84153 ASSAY OF PSA TOTAL: CPT

## 2020-03-11 PROCEDURE — 82728 ASSAY OF FERRITIN: CPT

## 2020-03-11 PROCEDURE — 82746 ASSAY OF FOLIC ACID SERUM: CPT

## 2020-03-11 PROCEDURE — 82747 ASSAY OF FOLIC ACID RBC: CPT

## 2020-03-11 PROCEDURE — 36415 COLL VENOUS BLD VENIPUNCTURE: CPT

## 2020-03-11 PROCEDURE — 83540 ASSAY OF IRON: CPT

## 2020-03-11 PROCEDURE — 83036 HEMOGLOBIN GLYCOSYLATED A1C: CPT

## 2020-03-11 PROCEDURE — 85027 COMPLETE CBC AUTOMATED: CPT

## 2020-03-11 PROCEDURE — 83880 ASSAY OF NATRIURETIC PEPTIDE: CPT

## 2020-03-11 PROCEDURE — 84443 ASSAY THYROID STIM HORMONE: CPT

## 2020-03-12 LAB
FOLATE BLD-MCNC: 382.2 NG/ML
FOLATE RBC-MCNC: 853 NG/ML
HCT VFR BLD AUTO: 44.8 % (ref 37.5–51)

## 2020-05-11 ENCOUNTER — TRANSCRIBE ORDERS (OUTPATIENT)
Dept: LAB | Facility: HOSPITAL | Age: 80
End: 2020-05-11

## 2020-05-11 ENCOUNTER — APPOINTMENT (OUTPATIENT)
Dept: LAB | Facility: HOSPITAL | Age: 80
End: 2020-05-11
Attending: UROLOGY
Payer: COMMERCIAL

## 2020-05-11 DIAGNOSIS — R97.20 ELEVATED PROSTATE SPECIFIC ANTIGEN (PSA): Primary | ICD-10-CM

## 2020-05-11 DIAGNOSIS — R97.20 ELEVATED PROSTATE SPECIFIC ANTIGEN (PSA): ICD-10-CM

## 2020-05-11 PROCEDURE — 36415 COLL VENOUS BLD VENIPUNCTURE: CPT

## 2020-05-11 PROCEDURE — 84153 ASSAY OF PSA TOTAL: CPT

## 2020-05-11 PROCEDURE — 84154 ASSAY OF PSA FREE: CPT

## 2020-05-12 LAB
PSA FREE MFR SERPL: 24.1 %
PSA FREE SERPL-MCNC: 1.59 NG/ML
PSA SERPL-MCNC: 6.6 NG/ML (ref 0–4)

## 2020-09-15 ENCOUNTER — TRANSCRIBE ORDERS (OUTPATIENT)
Dept: LAB | Facility: HOSPITAL | Age: 80
End: 2020-09-15

## 2020-09-15 ENCOUNTER — APPOINTMENT (OUTPATIENT)
Dept: LAB | Facility: HOSPITAL | Age: 80
End: 2020-09-15
Attending: INTERNAL MEDICINE
Payer: COMMERCIAL

## 2020-09-15 DIAGNOSIS — E03.9 HYPOTHYROIDISM, UNSPECIFIED TYPE: ICD-10-CM

## 2020-09-15 DIAGNOSIS — E55.9 VITAMIN D DEFICIENCY: ICD-10-CM

## 2020-09-15 DIAGNOSIS — R77.2 ELEVATED AFP: ICD-10-CM

## 2020-09-15 DIAGNOSIS — I10 HYPERTENSION, UNSPECIFIED TYPE: ICD-10-CM

## 2020-09-15 DIAGNOSIS — R77.2 ELEVATED AFP: Primary | ICD-10-CM

## 2020-09-15 LAB
ALBUMIN SERPL BCP-MCNC: 4.1 G/DL (ref 3.5–5.7)
ALP SERPL-CCNC: 62 U/L (ref 55–165)
ALT SERPL W P-5'-P-CCNC: 14 U/L (ref 7–52)
ANION GAP SERPL CALCULATED.3IONS-SCNC: 5 MMOL/L (ref 4–13)
AST SERPL W P-5'-P-CCNC: 16 U/L (ref 13–39)
BILIRUB SERPL-MCNC: 0.6 MG/DL (ref 0.2–1)
BUN SERPL-MCNC: 25 MG/DL (ref 7–25)
CALCIUM SERPL-MCNC: 9.1 MG/DL (ref 8.6–10.5)
CHLORIDE SERPL-SCNC: 104 MMOL/L (ref 98–107)
CHOLEST SERPL-MCNC: 149 MG/DL (ref 0–200)
CO2 SERPL-SCNC: 32 MMOL/L (ref 21–31)
CREAT SERPL-MCNC: 1.44 MG/DL (ref 0.7–1.3)
ERYTHROCYTE [DISTWIDTH] IN BLOOD BY AUTOMATED COUNT: 13.4 % (ref 11.5–14.5)
GFR SERPL CREATININE-BSD FRML MDRD: 46 ML/MIN/1.73SQ M
GLUCOSE P FAST SERPL-MCNC: 96 MG/DL (ref 65–99)
HCT VFR BLD AUTO: 44.3 % (ref 42–47)
HDLC SERPL-MCNC: 39 MG/DL
HGB BLD-MCNC: 15.1 G/DL (ref 14–18)
LDLC SERPL CALC-MCNC: 94 MG/DL (ref 0–100)
MCH RBC QN AUTO: 30.9 PG (ref 26–34)
MCHC RBC AUTO-ENTMCNC: 34.1 G/DL (ref 31–37)
MCV RBC AUTO: 91 FL (ref 81–99)
NONHDLC SERPL-MCNC: 110 MG/DL
PLATELET # BLD AUTO: 154 THOUSANDS/UL (ref 149–390)
PMV BLD AUTO: 8.5 FL (ref 8.6–11.7)
POTASSIUM SERPL-SCNC: 5.2 MMOL/L (ref 3.5–5.5)
PROT SERPL-MCNC: 6.8 G/DL (ref 6.4–8.9)
RBC # BLD AUTO: 4.9 MILLION/UL (ref 4.3–5.9)
SODIUM SERPL-SCNC: 141 MMOL/L (ref 134–143)
T4 FREE SERPL-MCNC: 1.14 NG/DL (ref 0.76–1.46)
TRIGL SERPL-MCNC: 81 MG/DL (ref 44–166)
TSH SERPL DL<=0.05 MIU/L-ACNC: 2 UIU/ML (ref 0.45–5.33)
WBC # BLD AUTO: 3.8 THOUSAND/UL (ref 4.8–10.8)

## 2020-09-15 PROCEDURE — 84443 ASSAY THYROID STIM HORMONE: CPT

## 2020-09-15 PROCEDURE — 36415 COLL VENOUS BLD VENIPUNCTURE: CPT

## 2020-09-15 PROCEDURE — 84153 ASSAY OF PSA TOTAL: CPT

## 2020-09-15 PROCEDURE — 85027 COMPLETE CBC AUTOMATED: CPT

## 2020-09-15 PROCEDURE — 84439 ASSAY OF FREE THYROXINE: CPT

## 2020-09-15 PROCEDURE — 84154 ASSAY OF PSA FREE: CPT

## 2020-09-15 PROCEDURE — 80053 COMPREHEN METABOLIC PANEL: CPT

## 2020-09-15 PROCEDURE — 80061 LIPID PANEL: CPT

## 2020-09-17 LAB
PSA FREE MFR SERPL: 20.1 %
PSA FREE SERPL-MCNC: 1.39 NG/ML
PSA SERPL-MCNC: 6.9 NG/ML (ref 0–4)

## 2020-12-02 ENCOUNTER — APPOINTMENT (EMERGENCY)
Dept: RADIOLOGY | Facility: HOSPITAL | Age: 80
End: 2020-12-02
Payer: COMMERCIAL

## 2020-12-02 ENCOUNTER — APPOINTMENT (EMERGENCY)
Dept: CT IMAGING | Facility: HOSPITAL | Age: 80
End: 2020-12-02
Payer: COMMERCIAL

## 2020-12-02 ENCOUNTER — HOSPITAL ENCOUNTER (EMERGENCY)
Facility: HOSPITAL | Age: 80
Discharge: HOME | End: 2020-12-02
Attending: INTERNAL MEDICINE | Admitting: INTERNAL MEDICINE
Payer: COMMERCIAL

## 2020-12-02 VITALS
DIASTOLIC BLOOD PRESSURE: 74 MMHG | RESPIRATION RATE: 18 BRPM | SYSTOLIC BLOOD PRESSURE: 148 MMHG | OXYGEN SATURATION: 96 % | HEART RATE: 58 BPM | BODY MASS INDEX: 29.8 KG/M2 | TEMPERATURE: 97.4 F | HEIGHT: 72 IN | WEIGHT: 220 LBS

## 2020-12-02 DIAGNOSIS — R00.2 PALPITATIONS: Primary | ICD-10-CM

## 2020-12-02 DIAGNOSIS — I51.7 CARDIOMEGALY: ICD-10-CM

## 2020-12-02 DIAGNOSIS — R55 NEAR SYNCOPE: ICD-10-CM

## 2020-12-02 LAB
ALBUMIN SERPL BCP-MCNC: 4.1 G/DL (ref 3.5–5.7)
ALP SERPL-CCNC: 69 U/L (ref 55–165)
ALT SERPL W P-5'-P-CCNC: 20 U/L (ref 7–52)
ANION GAP SERPL CALCULATED.3IONS-SCNC: 5 MMOL/L (ref 4–13)
APTT PPP: 29 SECONDS (ref 23–37)
AST SERPL W P-5'-P-CCNC: 18 U/L (ref 13–39)
ATRIAL RATE: 70 BPM
BASOPHILS # BLD AUTO: 0 THOUSANDS/ΜL (ref 0–0.1)
BASOPHILS NFR BLD AUTO: 1 % (ref 0–2)
BILIRUB SERPL-MCNC: 0.5 MG/DL (ref 0.2–1)
BUN SERPL-MCNC: 26 MG/DL (ref 7–25)
CALCIUM SERPL-MCNC: 9.4 MG/DL (ref 8.6–10.5)
CHLORIDE SERPL-SCNC: 103 MMOL/L (ref 98–107)
CO2 SERPL-SCNC: 32 MMOL/L (ref 21–31)
CREAT SERPL-MCNC: 1.52 MG/DL (ref 0.7–1.3)
D DIMER PPP FEU-MCNC: 1.58 UG/ML FEU
EOSINOPHIL # BLD AUTO: 0.1 THOUSAND/ΜL (ref 0–0.61)
EOSINOPHIL NFR BLD AUTO: 1 % (ref 0–5)
ERYTHROCYTE [DISTWIDTH] IN BLOOD BY AUTOMATED COUNT: 13.4 % (ref 11.5–14.5)
GFR SERPL CREATININE-BSD FRML MDRD: 43 ML/MIN/1.73SQ M
GLUCOSE SERPL-MCNC: 137 MG/DL (ref 65–99)
HCT VFR BLD AUTO: 45.2 % (ref 42–47)
HGB BLD-MCNC: 15.3 G/DL (ref 14–18)
INR PPP: 0.97 (ref 0.84–1.19)
LYMPHOCYTES # BLD AUTO: 1.1 THOUSANDS/ΜL (ref 0.6–4.47)
LYMPHOCYTES NFR BLD AUTO: 19 % (ref 21–51)
MCH RBC QN AUTO: 30.7 PG (ref 26–34)
MCHC RBC AUTO-ENTMCNC: 33.9 G/DL (ref 31–37)
MCV RBC AUTO: 91 FL (ref 81–99)
MONOCYTES # BLD AUTO: 0.3 THOUSAND/ΜL (ref 0.17–1.22)
MONOCYTES NFR BLD AUTO: 6 % (ref 2–12)
NEUTROPHILS # BLD AUTO: 4.3 THOUSANDS/ΜL (ref 1.4–6.5)
NEUTS SEG NFR BLD AUTO: 74 % (ref 42–75)
P AXIS: 73 DEGREES
PLATELET # BLD AUTO: 160 THOUSANDS/UL (ref 149–390)
PMV BLD AUTO: 8.5 FL (ref 8.6–11.7)
POTASSIUM SERPL-SCNC: 4.5 MMOL/L (ref 3.5–5.5)
PR INTERVAL: 154 MS
PROT SERPL-MCNC: 7.1 G/DL (ref 6.4–8.9)
PROTHROMBIN TIME: 12.8 SECONDS (ref 11.6–14.5)
QRS AXIS: 75 DEGREES
QRSD INTERVAL: 96 MS
QT INTERVAL: 388 MS
QTC INTERVAL: 419 MS
RBC # BLD AUTO: 5 MILLION/UL (ref 4.3–5.9)
SODIUM SERPL-SCNC: 140 MMOL/L (ref 134–143)
T WAVE AXIS: 70 DEGREES
TROPONIN I SERPL-MCNC: <0.03 NG/ML
VENTRICULAR RATE: 70 BPM
WBC # BLD AUTO: 5.7 THOUSAND/UL (ref 4.8–10.8)

## 2020-12-02 PROCEDURE — 96360 HYDRATION IV INFUSION INIT: CPT

## 2020-12-02 PROCEDURE — 80053 COMPREHEN METABOLIC PANEL: CPT | Performed by: INTERNAL MEDICINE

## 2020-12-02 PROCEDURE — 99284 EMERGENCY DEPT VISIT MOD MDM: CPT | Performed by: INTERNAL MEDICINE

## 2020-12-02 PROCEDURE — 93005 ELECTROCARDIOGRAM TRACING: CPT

## 2020-12-02 PROCEDURE — 84484 ASSAY OF TROPONIN QUANT: CPT | Performed by: INTERNAL MEDICINE

## 2020-12-02 PROCEDURE — 36415 COLL VENOUS BLD VENIPUNCTURE: CPT

## 2020-12-02 PROCEDURE — 71045 X-RAY EXAM CHEST 1 VIEW: CPT

## 2020-12-02 PROCEDURE — 85730 THROMBOPLASTIN TIME PARTIAL: CPT | Performed by: INTERNAL MEDICINE

## 2020-12-02 PROCEDURE — 85025 COMPLETE CBC W/AUTO DIFF WBC: CPT | Performed by: INTERNAL MEDICINE

## 2020-12-02 PROCEDURE — 85379 FIBRIN DEGRADATION QUANT: CPT | Performed by: INTERNAL MEDICINE

## 2020-12-02 PROCEDURE — G1004 CDSM NDSC: HCPCS

## 2020-12-02 PROCEDURE — 99285 EMERGENCY DEPT VISIT HI MDM: CPT

## 2020-12-02 PROCEDURE — 85610 PROTHROMBIN TIME: CPT | Performed by: INTERNAL MEDICINE

## 2020-12-02 PROCEDURE — 93010 ELECTROCARDIOGRAM REPORT: CPT | Performed by: INTERNAL MEDICINE

## 2020-12-02 PROCEDURE — 71275 CT ANGIOGRAPHY CHEST: CPT

## 2020-12-02 RX ORDER — FAMOTIDINE 20 MG/1
20 TABLET, FILM COATED ORAL 2 TIMES DAILY
COMMUNITY

## 2020-12-02 RX ADMIN — SODIUM CHLORIDE 500 ML: 0.9 INJECTION, SOLUTION INTRAVENOUS at 16:02

## 2020-12-02 RX ADMIN — IOHEXOL 100 ML: 350 INJECTION, SOLUTION INTRAVENOUS at 16:50

## 2020-12-07 ENCOUNTER — TRANSCRIBE ORDERS (OUTPATIENT)
Dept: ADMINISTRATIVE | Facility: HOSPITAL | Age: 80
End: 2020-12-07

## 2020-12-07 DIAGNOSIS — R00.0 TACHYARRHYTHMIA: ICD-10-CM

## 2020-12-07 DIAGNOSIS — I49.9 CARDIAC ARRHYTHMIA, UNSPECIFIED: ICD-10-CM

## 2020-12-07 DIAGNOSIS — R42 DIZZINESS AND GIDDINESS: Primary | ICD-10-CM

## 2020-12-09 ENCOUNTER — HOSPITAL ENCOUNTER (OUTPATIENT)
Dept: NON INVASIVE DIAGNOSTICS | Facility: CLINIC | Age: 80
Discharge: HOME | End: 2020-12-09
Attending: INTERNAL MEDICINE
Payer: COMMERCIAL

## 2020-12-09 ENCOUNTER — CONSULT (OUTPATIENT)
Dept: CARDIOLOGY CLINIC | Facility: CLINIC | Age: 80
End: 2020-12-09

## 2020-12-09 VITALS
DIASTOLIC BLOOD PRESSURE: 80 MMHG | HEIGHT: 72 IN | HEART RATE: 54 BPM | SYSTOLIC BLOOD PRESSURE: 170 MMHG | BODY MASS INDEX: 30.46 KG/M2 | WEIGHT: 224.87 LBS

## 2020-12-09 DIAGNOSIS — R42 DIZZINESS AND GIDDINESS: ICD-10-CM

## 2020-12-09 DIAGNOSIS — I99.9 VASCULAR ABNORMALITY: ICD-10-CM

## 2020-12-09 DIAGNOSIS — R00.0 TACHYARRHYTHMIA: ICD-10-CM

## 2020-12-09 DIAGNOSIS — I49.9 CARDIAC ARRHYTHMIA, UNSPECIFIED: ICD-10-CM

## 2020-12-09 DIAGNOSIS — R00.0 TACHYCARDIA: Primary | ICD-10-CM

## 2020-12-09 DIAGNOSIS — I51.7 CARDIOMEGALY: ICD-10-CM

## 2020-12-09 PROCEDURE — 93225 XTRNL ECG REC<48 HRS REC: CPT

## 2020-12-09 PROCEDURE — 93226 XTRNL ECG REC<48 HR SCAN A/R: CPT

## 2020-12-09 RX ORDER — TAMSULOSIN HYDROCHLORIDE 0.4 MG/1
CAPSULE ORAL
COMMUNITY

## 2020-12-15 ENCOUNTER — HOSPITAL ENCOUNTER (OUTPATIENT)
Dept: NON INVASIVE DIAGNOSTICS | Facility: HOSPITAL | Age: 80
Discharge: HOME | End: 2020-12-15
Payer: COMMERCIAL

## 2020-12-15 ENCOUNTER — DOCUMENTATION (OUTPATIENT)
Dept: CARDIOLOGY CLINIC | Facility: CLINIC | Age: 80
End: 2020-12-15

## 2020-12-15 DIAGNOSIS — Z08 ENCOUNTER FOR FOLLOW-UP SURVEILLANCE OF MELANOMA: Primary | ICD-10-CM

## 2020-12-15 DIAGNOSIS — I47.2 NSVT (NONSUSTAINED VENTRICULAR TACHYCARDIA) (HCC): ICD-10-CM

## 2020-12-15 DIAGNOSIS — R06.00 DYSPNEA AND RESPIRATORY ABNORMALITY: ICD-10-CM

## 2020-12-15 DIAGNOSIS — I99.9 VASCULAR ABNORMALITY: ICD-10-CM

## 2020-12-15 DIAGNOSIS — R06.89 DYSPNEA AND RESPIRATORY ABNORMALITY: ICD-10-CM

## 2020-12-15 DIAGNOSIS — Z85.820 ENCOUNTER FOR FOLLOW-UP SURVEILLANCE OF MELANOMA: Primary | ICD-10-CM

## 2020-12-15 PROCEDURE — 93880 EXTRACRANIAL BILAT STUDY: CPT | Performed by: SURGERY

## 2020-12-15 PROCEDURE — 93227 XTRNL ECG REC<48 HR R&I: CPT | Performed by: INTERNAL MEDICINE

## 2020-12-15 PROCEDURE — 93880 EXTRACRANIAL BILAT STUDY: CPT

## 2020-12-18 ENCOUNTER — HOSPITAL ENCOUNTER (OUTPATIENT)
Dept: NON INVASIVE DIAGNOSTICS | Facility: CLINIC | Age: 80
Discharge: HOME | End: 2020-12-18
Payer: COMMERCIAL

## 2020-12-18 DIAGNOSIS — I51.7 CARDIOMEGALY: ICD-10-CM

## 2020-12-18 PROCEDURE — 93306 TTE W/DOPPLER COMPLETE: CPT | Performed by: INTERNAL MEDICINE

## 2020-12-18 PROCEDURE — 93306 TTE W/DOPPLER COMPLETE: CPT

## 2020-12-22 ENCOUNTER — TELEPHONE (OUTPATIENT)
Dept: CARDIOLOGY CLINIC | Facility: CLINIC | Age: 80
End: 2020-12-22

## 2020-12-29 ENCOUNTER — HOSPITAL ENCOUNTER (OUTPATIENT)
Dept: NON INVASIVE DIAGNOSTICS | Facility: HOSPITAL | Age: 80
Discharge: HOME | End: 2020-12-29
Payer: COMMERCIAL

## 2020-12-29 ENCOUNTER — HOSPITAL ENCOUNTER (OUTPATIENT)
Dept: NUCLEAR MEDICINE | Facility: HOSPITAL | Age: 80
Discharge: HOME | End: 2020-12-29
Payer: COMMERCIAL

## 2020-12-29 DIAGNOSIS — I47.2 NSVT (NONSUSTAINED VENTRICULAR TACHYCARDIA) (HCC): ICD-10-CM

## 2020-12-29 DIAGNOSIS — R06.89 DYSPNEA AND RESPIRATORY ABNORMALITY: ICD-10-CM

## 2020-12-29 DIAGNOSIS — R06.00 DYSPNEA AND RESPIRATORY ABNORMALITY: ICD-10-CM

## 2020-12-29 PROCEDURE — 93018 CV STRESS TEST I&R ONLY: CPT | Performed by: INTERNAL MEDICINE

## 2020-12-29 PROCEDURE — 93016 CV STRESS TEST SUPVJ ONLY: CPT | Performed by: INTERNAL MEDICINE

## 2020-12-29 PROCEDURE — 93017 CV STRESS TEST TRACING ONLY: CPT

## 2020-12-29 PROCEDURE — A9502 TC99M TETROFOSMIN: HCPCS

## 2020-12-29 PROCEDURE — 78452 HT MUSCLE IMAGE SPECT MULT: CPT | Performed by: INTERNAL MEDICINE

## 2020-12-29 PROCEDURE — 78452 HT MUSCLE IMAGE SPECT MULT: CPT

## 2020-12-29 PROCEDURE — G1004 CDSM NDSC: HCPCS

## 2020-12-29 RX ADMIN — REGADENOSON 0.4 MG: 0.08 INJECTION, SOLUTION INTRAVENOUS at 10:15

## 2020-12-30 ENCOUNTER — OFFICE VISIT (OUTPATIENT)
Dept: CARDIOLOGY CLINIC | Facility: CLINIC | Age: 80
End: 2020-12-30
Payer: COMMERCIAL

## 2020-12-30 VITALS
HEIGHT: 72 IN | WEIGHT: 224 LBS | BODY MASS INDEX: 30.34 KG/M2 | SYSTOLIC BLOOD PRESSURE: 126 MMHG | DIASTOLIC BLOOD PRESSURE: 70 MMHG | HEART RATE: 56 BPM

## 2020-12-30 DIAGNOSIS — I49.3 PVC (PREMATURE VENTRICULAR CONTRACTION): Primary | ICD-10-CM

## 2020-12-30 DIAGNOSIS — J98.6 DIAPHRAGM PARALYSIS: ICD-10-CM

## 2020-12-30 PROBLEM — I71.2 THORACIC AORTIC ANEURYSM (HCC): Status: ACTIVE | Noted: 2020-12-30

## 2020-12-30 PROBLEM — I71.20 THORACIC AORTIC ANEURYSM: Status: ACTIVE | Noted: 2020-12-30

## 2020-12-30 LAB
CHEST PAIN STATEMENT: NORMAL
ECG INTERP BEFORE EX: NORMAL
MAX DIASTOLIC BP: 80 MMHG
MAX HEART RATE: 96 BPM
MAX PREDICTED HEART RATE: 141 BPM
MAX. SYSTOLIC BP: 130 MMHG
PROTOCOL NAME: NORMAL
REASON FOR TERMINATION: NORMAL
TARGET HR FORMULA: NORMAL
TEST INDICATION: NORMAL
TIME IN EXERCISE PHASE: NORMAL

## 2020-12-30 PROCEDURE — 99214 OFFICE O/P EST MOD 30 MIN: CPT | Performed by: INTERNAL MEDICINE

## 2020-12-30 RX ORDER — FLECAINIDE ACETATE 50 MG/1
50 TABLET ORAL 2 TIMES DAILY
Qty: 60 TABLET | Refills: 5 | Status: SHIPPED | OUTPATIENT
Start: 2020-12-30 | End: 2021-01-26

## 2021-01-22 DIAGNOSIS — Z23 ENCOUNTER FOR IMMUNIZATION: ICD-10-CM

## 2021-01-26 ENCOUNTER — OFFICE VISIT (OUTPATIENT)
Dept: CARDIOLOGY CLINIC | Facility: CLINIC | Age: 81
End: 2021-01-26
Payer: COMMERCIAL

## 2021-01-26 VITALS
SYSTOLIC BLOOD PRESSURE: 142 MMHG | BODY MASS INDEX: 30.34 KG/M2 | WEIGHT: 224 LBS | HEART RATE: 65 BPM | DIASTOLIC BLOOD PRESSURE: 78 MMHG | HEIGHT: 72 IN

## 2021-01-26 DIAGNOSIS — I49.3 PVC (PREMATURE VENTRICULAR CONTRACTION): Primary | ICD-10-CM

## 2021-01-26 DIAGNOSIS — I71.2 THORACIC AORTIC ANEURYSM WITHOUT RUPTURE (HCC): ICD-10-CM

## 2021-01-26 PROCEDURE — 93000 ELECTROCARDIOGRAM COMPLETE: CPT | Performed by: INTERNAL MEDICINE

## 2021-01-26 PROCEDURE — 1036F TOBACCO NON-USER: CPT | Performed by: INTERNAL MEDICINE

## 2021-01-26 PROCEDURE — 99213 OFFICE O/P EST LOW 20 MIN: CPT | Performed by: INTERNAL MEDICINE

## 2021-01-26 PROCEDURE — 1160F RVW MEDS BY RX/DR IN RCRD: CPT | Performed by: INTERNAL MEDICINE

## 2021-01-26 PROCEDURE — 3077F SYST BP >= 140 MM HG: CPT | Performed by: INTERNAL MEDICINE

## 2021-01-26 PROCEDURE — 3078F DIAST BP <80 MM HG: CPT | Performed by: INTERNAL MEDICINE

## 2021-01-26 RX ORDER — FLECAINIDE ACETATE 50 MG/1
50 TABLET ORAL 2 TIMES DAILY
Qty: 180 TABLET | Refills: 5
Start: 2021-01-26 | End: 2021-03-17 | Stop reason: SDUPTHER

## 2021-01-26 NOTE — PROGRESS NOTES
Patient ID: Delaney Gutiérrez is a 79 y o  male  Plan:      Thoracic aortic aneurysm (HCC)  4 3 cm  By last echo  Will recheck in December  PVC (premature ventricular contraction)  Much improved on flecainide  Follow up Plan/Other summary comments:  Patient doing much better on flecainide with complete resolution of ectopy  Return visit, EKG, and echocardiogram in December  HPI:  Patient is seen in follow-up today regarding the above  Since the last visit he has been on flecainide and any sense of ectopy has resolved  No chest pain or chest pressure  No syncope or near syncope  Results for orders placed or performed in visit on 01/26/21   POCT ECG    Impression    NSR  Minor NSSTs  Normal intervals  Most recent or relevant cardiac/vascular testing:    Holter report 12/09/2020:  3 5% of beats are ventricular ectopic  There was a 48 beat run of very slow ventricular tachycardia  Echo 12/18/2020:  4 3 cm ascending aorta  Otherwise normal   Myoview 12/29/2020:  Normal EF  No prior infarct  No ischemia  Lots of accelerated idioventricular rhythm when he was monitored for the test   This seemed to raine with Lexiscan  Past Surgical History:   Procedure Laterality Date    EYE SURGERY      HERNIA REPAIR      ROTATOR CUFF REPAIR       CMP:   Lab Results   Component Value Date     12/31/2013    K 4 5 12/02/2020    K 4 1 12/31/2013     12/02/2020     12/31/2013    CO2 32 (H) 12/02/2020    CO2 27 12/31/2013    BUN 26 (H) 12/02/2020    BUN 23 12/31/2013    CREATININE 1 52 (H) 12/02/2020    CREATININE 1 11 12/31/2013    GLUCOSE 92 12/31/2013    EGFR 43 12/02/2020       Lipid Profile:   Lab Results   Component Value Date    TRIG 81 09/15/2020    HDL 39 (L) 09/15/2020         Review of Systems   10  point ROS  was otherwise non pertinent or negative except as per HPI or as below     Gait: Normal         Objective:     /78   Pulse 65   Ht 6' (1 829 m)   [employer] 102 kg (224 lb)   BMI 30 38 kg/m²     PHYSICAL EXAM:    General:  Normal appearance in no distress  Eyes:  Anicteric  Oral mucosa:  Moist   Neck:  No JVD  Carotid upstrokes are brisk without bruits  No masses  Chest:  Clear to auscultation and percussion  Cardiac:  No palpable PMI  Normal S1 and S2  No murmur gallop or rub  Abdomen:  Soft and nontender  No palpable organomegaly or aortic enlargement  Extremities:  No peripheral edema  Musculoskeletal:  Symmetric  Vascular:  Femoral pulses are brisk without bruits  Popliteal pulses are intact bilaterally  Pedal pulses are intact  Neuro:  Grossly symmetric  Psych:  Alert and oriented x3          Current Outpatient Medications:     aspirin (ASPIR-LOW) 81 mg EC tablet, Take 1 tablet by mouth daily, Disp: , Rfl:     Cholecalciferol (CVS VITAMIN D) 2000 units CAPS, Take 1 capsule by mouth daily, Disp: , Rfl:     famotidine (PEPCID) 20 mg tablet, Take 20 mg by mouth 2 (two) times a day, Disp: , Rfl:     flecainide (TAMBOCOR) 50 mg tablet, Take 1 tablet (50 mg total) by mouth 2 (two) times a day, Disp: 180 tablet, Rfl: 5    levothyroxine 75 mcg tablet, Take 1 tablet by mouth daily, Disp: , Rfl:     tamsulosin (FLOMAX) 0 4 mg, tamsulosin 0 4 mg capsule, Disp: , Rfl:   Allergies   Allergen Reactions    Clarithromycin Other (See Comments)     biaxin     Past Medical History:   Diagnosis Date    BPH (benign prostatic hyperplasia)     Disease of thyroid gland     GERD (gastroesophageal reflux disease)            Social History     Tobacco Use   Smoking Status Never Smoker   Smokeless Tobacco Never Used

## 2021-01-28 ENCOUNTER — IMMUNIZATIONS (OUTPATIENT)
Dept: FAMILY MEDICINE CLINIC | Facility: HOSPITAL | Age: 81
End: 2021-01-28

## 2021-01-28 DIAGNOSIS — Z23 ENCOUNTER FOR IMMUNIZATION: Primary | ICD-10-CM

## 2021-01-28 PROCEDURE — 0011A SARS-COV-2 / COVID-19 MRNA VACCINE (MODERNA) 100 MCG: CPT

## 2021-01-28 PROCEDURE — 91301 SARS-COV-2 / COVID-19 MRNA VACCINE (MODERNA) 100 MCG: CPT

## 2021-02-03 ENCOUNTER — TELEPHONE (OUTPATIENT)
Dept: CARDIOLOGY CLINIC | Facility: CLINIC | Age: 81
End: 2021-02-03

## 2021-02-03 NOTE — TELEPHONE ENCOUNTER
nicolás spoke to urbano to schedule his echo in december and he wanted her to inform you his o2 at rest 86, 87, 88       Do you recommend any thing    testing? Call PCP? nicolás said he wasn't having any complaints

## 2021-02-04 NOTE — TELEPHONE ENCOUNTER
Nora Chowdary MD  You 2 hours ago (9:37 AM)     Have patient call PCP  It shouldn't be a cardio issue      Message text

## 2021-02-25 ENCOUNTER — IMMUNIZATIONS (OUTPATIENT)
Dept: FAMILY MEDICINE CLINIC | Facility: HOSPITAL | Age: 81
End: 2021-02-25

## 2021-02-25 DIAGNOSIS — Z23 ENCOUNTER FOR IMMUNIZATION: Primary | ICD-10-CM

## 2021-02-25 PROCEDURE — 91301 SARS-COV-2 / COVID-19 MRNA VACCINE (MODERNA) 100 MCG: CPT

## 2021-02-25 PROCEDURE — 0012A SARS-COV-2 / COVID-19 MRNA VACCINE (MODERNA) 100 MCG: CPT

## 2021-03-17 DIAGNOSIS — I49.3 PVC (PREMATURE VENTRICULAR CONTRACTION): ICD-10-CM

## 2021-03-17 NOTE — TELEPHONE ENCOUNTER
med  Received:  Today  Message Contents   Jarocho ARA[facility]             Flecainide  50 mg  BID   180 with 3 refills to [facility] in [place]

## 2021-03-18 RX ORDER — FLECAINIDE ACETATE 50 MG/1
50 TABLET ORAL 2 TIMES DAILY
Qty: 180 TABLET | Refills: 3 | Status: SHIPPED | OUTPATIENT
Start: 2021-03-18 | End: 2022-01-25 | Stop reason: DRUGHIGH

## 2021-04-29 ENCOUNTER — TELEPHONE (OUTPATIENT)
Dept: CARDIOLOGY CLINIC | Facility: CLINIC | Age: 81
End: 2021-04-29

## 2021-04-29 NOTE — TELEPHONE ENCOUNTER
Patient called  He has been noticing episodes of blurry vision and bilateral LE edema, which he attributes to the Flecainide  He has also had a 7-lb weight gain  Any recommendations?

## 2021-05-12 ENCOUNTER — APPOINTMENT (OUTPATIENT)
Dept: LAB | Facility: HOSPITAL | Age: 81
End: 2021-05-12
Attending: INTERNAL MEDICINE
Payer: COMMERCIAL

## 2021-05-12 ENCOUNTER — TRANSCRIBE ORDERS (OUTPATIENT)
Dept: LAB | Facility: HOSPITAL | Age: 81
End: 2021-05-12

## 2021-05-12 DIAGNOSIS — R63.5 WEIGHT GAIN: ICD-10-CM

## 2021-05-12 DIAGNOSIS — I10 HYPERTENSION, UNSPECIFIED TYPE: Primary | ICD-10-CM

## 2021-05-12 DIAGNOSIS — I10 HYPERTENSION, UNSPECIFIED TYPE: ICD-10-CM

## 2021-05-12 DIAGNOSIS — E55.9 VITAMIN D DEFICIENCY: ICD-10-CM

## 2021-05-12 LAB
25(OH)D3 SERPL-MCNC: 31 NG/ML (ref 30–100)
ALBUMIN SERPL BCP-MCNC: 3.9 G/DL (ref 3.5–5.7)
ALP SERPL-CCNC: 62 U/L (ref 55–165)
ALT SERPL W P-5'-P-CCNC: 14 U/L (ref 7–52)
ANION GAP SERPL CALCULATED.3IONS-SCNC: 1 MMOL/L (ref 4–13)
AST SERPL W P-5'-P-CCNC: 15 U/L (ref 13–39)
BILIRUB SERPL-MCNC: 0.6 MG/DL (ref 0.2–1)
BNP SERPL-MCNC: 51 PG/ML (ref 1–100)
BUN SERPL-MCNC: 26 MG/DL (ref 7–25)
CALCIUM SERPL-MCNC: 8.8 MG/DL (ref 8.6–10.5)
CHLORIDE SERPL-SCNC: 101 MMOL/L (ref 98–107)
CHOLEST SERPL-MCNC: 162 MG/DL (ref 0–200)
CO2 SERPL-SCNC: 36 MMOL/L (ref 21–31)
CREAT SERPL-MCNC: 1.32 MG/DL (ref 0.7–1.3)
ERYTHROCYTE [DISTWIDTH] IN BLOOD BY AUTOMATED COUNT: 13.5 % (ref 11.5–14.5)
EST. AVERAGE GLUCOSE BLD GHB EST-MCNC: 111 MG/DL
GFR SERPL CREATININE-BSD FRML MDRD: 51 ML/MIN/1.73SQ M
GLUCOSE P FAST SERPL-MCNC: 89 MG/DL (ref 65–99)
HBA1C MFR BLD: 5.5 %
HCT VFR BLD AUTO: 42.4 % (ref 42–47)
HDLC SERPL-MCNC: 43 MG/DL
HGB BLD-MCNC: 14.1 G/DL (ref 14–18)
LDLC SERPL CALC-MCNC: 101 MG/DL (ref 0–100)
MCH RBC QN AUTO: 30.5 PG (ref 26–34)
MCHC RBC AUTO-ENTMCNC: 33.2 G/DL (ref 31–37)
MCV RBC AUTO: 92 FL (ref 81–99)
NONHDLC SERPL-MCNC: 119 MG/DL
PLATELET # BLD AUTO: 170 THOUSANDS/UL (ref 149–390)
PMV BLD AUTO: 8.4 FL (ref 8.6–11.7)
POTASSIUM SERPL-SCNC: 4.5 MMOL/L (ref 3.5–5.5)
PROT SERPL-MCNC: 6.3 G/DL (ref 6.4–8.9)
RBC # BLD AUTO: 4.63 MILLION/UL (ref 4.3–5.9)
SODIUM SERPL-SCNC: 138 MMOL/L (ref 134–143)
T4 FREE SERPL-MCNC: 1.2 NG/DL (ref 0.76–1.46)
TRIGL SERPL-MCNC: 91 MG/DL (ref 44–166)
TSH SERPL DL<=0.05 MIU/L-ACNC: 2.59 UIU/ML (ref 0.45–5.33)
WBC # BLD AUTO: 3.5 THOUSAND/UL (ref 4.8–10.8)

## 2021-05-12 PROCEDURE — 82306 VITAMIN D 25 HYDROXY: CPT

## 2021-05-12 PROCEDURE — 83036 HEMOGLOBIN GLYCOSYLATED A1C: CPT

## 2021-05-12 PROCEDURE — 84439 ASSAY OF FREE THYROXINE: CPT

## 2021-05-12 PROCEDURE — 85027 COMPLETE CBC AUTOMATED: CPT

## 2021-05-12 PROCEDURE — 36415 COLL VENOUS BLD VENIPUNCTURE: CPT

## 2021-05-12 PROCEDURE — 80061 LIPID PANEL: CPT

## 2021-05-12 PROCEDURE — 80053 COMPREHEN METABOLIC PANEL: CPT

## 2021-05-12 PROCEDURE — 84443 ASSAY THYROID STIM HORMONE: CPT

## 2021-05-12 PROCEDURE — 83880 ASSAY OF NATRIURETIC PEPTIDE: CPT

## 2021-05-31 ENCOUNTER — HOSPITAL ENCOUNTER (EMERGENCY)
Facility: HOSPITAL | Age: 81
Discharge: HOME | End: 2021-05-31
Attending: FAMILY MEDICINE | Admitting: FAMILY MEDICINE
Payer: COMMERCIAL

## 2021-05-31 VITALS
DIASTOLIC BLOOD PRESSURE: 72 MMHG | RESPIRATION RATE: 20 BRPM | WEIGHT: 224 LBS | SYSTOLIC BLOOD PRESSURE: 165 MMHG | TEMPERATURE: 97.5 F | OXYGEN SATURATION: 98 % | BODY MASS INDEX: 30.38 KG/M2 | HEART RATE: 58 BPM
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
## 2021-05-31 DIAGNOSIS — L03.90 CELLULITIS: Primary | ICD-10-CM

## 2021-05-31 PROCEDURE — 99282 EMERGENCY DEPT VISIT SF MDM: CPT

## 2021-05-31 PROCEDURE — 99284 EMERGENCY DEPT VISIT MOD MDM: CPT | Performed by: PHYSICIAN ASSISTANT

## 2021-05-31 RX ORDER — DOXYCYCLINE HYCLATE 100 MG/1
100 CAPSULE ORAL 2 TIMES DAILY
Qty: 14 CAPSULE | Refills: 0 | Status: SHIPPED | OUTPATIENT
Start: 2021-05-31 | End: 2021-06-07

## 2021-05-31 NOTE — ED PROVIDER NOTES
History  Chief Complaint   Patient presents with    Tick Removal     Removed from head PTA     This is an 81-year-old male patient presents after his wife found the tick over the left side of his head just above his mastoid  It is on the approximately 3 days she removed intact the area is slightly erythematous and raised without abscess or induration fluctuance  No sign of mastitis  The he denies fever chills headache blurred vision double vision cough congestion sore throat nausea vomiting diarrhea abdominal pain no chest pain or shortness of breath  At this time patient be placed on doxycycline to help with cellulitis and also avoid line disease          Prior to Admission Medications   Prescriptions Last Dose Informant Patient Reported? Taking? Cholecalciferol (CVS VITAMIN D) 2000 units CAPS   Yes Yes   Sig: Take 1 capsule by mouth daily   aspirin (ASPIR-LOW) 81 mg EC tablet   Yes Yes   Sig: Take 1 tablet by mouth daily   famotidine (PEPCID) 20 mg tablet   Yes Yes   Sig: Take 20 mg by mouth 2 (two) times a day   flecainide (TAMBOCOR) 50 mg tablet   No Yes   Sig: Take 1 tablet (50 mg total) by mouth 2 (two) times a day   levothyroxine 75 mcg tablet   Yes Yes   Sig: Take 1 tablet by mouth daily   tamsulosin (FLOMAX) 0 4 mg   Yes Yes   Sig: tamsulosin 0 4 mg capsule      Facility-Administered Medications: None       Past Medical History:   Diagnosis Date    BPH (benign prostatic hyperplasia)     Disease of thyroid gland     GERD (gastroesophageal reflux disease)        Past Surgical History:   Procedure Laterality Date    EYE SURGERY      HERNIA REPAIR      ROTATOR CUFF REPAIR         History reviewed  No pertinent family history  I have reviewed and agree with the history as documented      E-Cigarette/Vaping    E-Cigarette Use Never User      E-Cigarette/Vaping Substances     Social History     Tobacco Use    Smoking status: Never Smoker    Smokeless tobacco: Never Used   Substance Use Topics    Alcohol use: Yes    Drug use: No       Review of Systems   Constitutional: Negative for diaphoresis, fatigue and fever  HENT: Negative for congestion, ear pain, nosebleeds and sore throat  Eyes: Negative for photophobia, pain, discharge and visual disturbance  Respiratory: Negative for cough, choking, chest tightness, shortness of breath and wheezing  Cardiovascular: Negative for chest pain and palpitations  Gastrointestinal: Negative for abdominal distention, abdominal pain, diarrhea and vomiting  Genitourinary: Negative for dysuria, flank pain and frequency  Musculoskeletal: Negative for arthralgias, back pain, gait problem, joint swelling, myalgias, neck pain and neck stiffness  Skin: Positive for wound  Negative for color change and rash  Neurological: Negative for dizziness, syncope and headaches  Psychiatric/Behavioral: Negative for behavioral problems and confusion  The patient is not nervous/anxious  All other systems reviewed and are negative  Physical Exam  Physical Exam  Vitals signs and nursing note reviewed  Constitutional:       General: He is not in acute distress  Appearance: Normal appearance  He is well-developed  He is not ill-appearing, toxic-appearing or diaphoretic  HENT:      Head: Normocephalic  Right Ear: External ear normal       Left Ear: External ear normal       Nose: Nose normal       Mouth/Throat:      Mouth: Mucous membranes are moist       Pharynx: Oropharynx is clear  Posterior oropharyngeal erythema present  Eyes:      Conjunctiva/sclera: Conjunctivae normal       Pupils: Pupils are equal, round, and reactive to light  Neck:      Musculoskeletal: Normal range of motion and neck supple  No neck rigidity or muscular tenderness  Vascular: No carotid bruit  Cardiovascular:      Rate and Rhythm: Normal rate and regular rhythm  Pulmonary:      Effort: Pulmonary effort is normal       Breath sounds: Normal breath sounds  Abdominal:      General: Bowel sounds are normal       Palpations: Abdomen is soft  Tenderness: There is no abdominal tenderness  Musculoskeletal: Normal range of motion  Lymphadenopathy:      Cervical: No cervical adenopathy  Skin:     General: Skin is warm  Capillary Refill: Capillary refill takes less than 2 seconds  Neurological:      Mental Status: He is alert and oriented to person, place, and time  Psychiatric:         Behavior: Behavior normal          Vital Signs  ED Triage Vitals   Temperature Pulse Respirations Blood Pressure SpO2   05/31/21 0948 05/31/21 0948 05/31/21 0948 05/31/21 0948 05/31/21 0948   97 5 °F (36 4 °C) 56 20 168/74 95 %      Temp src Heart Rate Source Patient Position - Orthostatic VS BP Location FiO2 (%)   -- -- -- 05/31/21 1001 --      Left arm       Pain Score       --                  Vitals:    05/31/21 0948 05/31/21 1001   BP: 168/74 165/72   Pulse: 56 58         Visual Acuity      ED Medications  Medications - No data to display    Diagnostic Studies  Results Reviewed     None                 No orders to display              Procedures  Procedures         ED Course                                           MDM    Disposition  Final diagnoses:   Tick bite   Cellulitis     Time reflects when diagnosis was documented in both MDM as applicable and the Disposition within this note     Time User Action Codes Description Comment    5/31/2021  9:51 AM Francisco Bazan Add Kaylynn Holstein  XXXA] Tick bite     5/31/2021  9:51 AM Francisco Bazan Add [L03 90] Cellulitis     5/31/2021  9:51 AM Francisco Bazan Modify [D08  XXXA] Tick bite     5/31/2021  9:51 AM [place], [address] [facility] Modify [L03 90] Cellulitis       ED Disposition     ED Disposition Condition Date/Time Comment    Discharge Stable Mon May 31, 2021  9:51 AM Laurie Rojas discharge to home/self care              Follow-up Information     Follow up With Specialties Details Why Contact Trudy Humphreys MD Emergency Medicine, Internal Medicine Schedule an appointment as soon as possible for a visit in 1 day  [address] [facility] [id number] [place] [address]  [phone]            Discharge Medication List as of 5/31/2021  9:52 AM      START taking these medications    Details   doxycycline hyclate (VIBRAMYCIN) 100 mg capsule Take 1 capsule (100 mg total) by mouth 2 (two) times a day for 7 days Take with food, Starting Mon 5/31/2021, Until Mon 6/7/2021, Print         CONTINUE these medications which have NOT CHANGED    Details   aspirin (ASPIR-LOW) 81 mg EC tablet Take 1 tablet by mouth daily, Historical Med      Cholecalciferol (CVS VITAMIN D) 2000 units CAPS Take 1 capsule by mouth daily, Historical Med      famotidine (PEPCID) 20 mg tablet Take 20 mg by mouth 2 (two) times a day, Historical Med      flecainide (TAMBOCOR) 50 mg tablet Take 1 tablet (50 mg total) by mouth 2 (two) times a day, Starting Thu 3/18/2021, Normal      levothyroxine 75 mcg tablet Take 1 tablet by mouth daily, Starting Mon 4/2/2018, Historical Med      tamsulosin (FLOMAX) 0 4 mg tamsulosin 0 4 mg capsule, Historical Med           No discharge procedures on file      PDMP Review     None          ED Provider  Electronically Signed by           Vu Chang PA-C  05/31/21 3651

## 2021-05-31 NOTE — DISCHARGE INSTRUCTIONS
Call your family doctor in follow-up in 1-2 days  Return with any worsening symptoms such as increased pain, fever, chills or any questions comments or concerns

## 2021-10-21 ENCOUNTER — OFFICE VISIT (OUTPATIENT)
Dept: CARDIOLOGY CLINIC | Facility: CLINIC | Age: 81
End: 2021-10-21
Payer: COMMERCIAL

## 2021-10-21 VITALS
SYSTOLIC BLOOD PRESSURE: 122 MMHG | WEIGHT: 224 LBS | BODY MASS INDEX: 30.34 KG/M2 | HEIGHT: 72 IN | HEART RATE: 58 BPM | DIASTOLIC BLOOD PRESSURE: 56 MMHG

## 2021-10-21 DIAGNOSIS — I71.2 THORACIC AORTIC ANEURYSM WITHOUT RUPTURE (HCC): ICD-10-CM

## 2021-10-21 DIAGNOSIS — I49.3 PVC (PREMATURE VENTRICULAR CONTRACTION): Primary | ICD-10-CM

## 2021-10-21 PROCEDURE — 99214 OFFICE O/P EST MOD 30 MIN: CPT | Performed by: INTERNAL MEDICINE

## 2021-10-21 PROCEDURE — 93000 ELECTROCARDIOGRAM COMPLETE: CPT | Performed by: INTERNAL MEDICINE

## 2021-11-15 ENCOUNTER — APPOINTMENT (OUTPATIENT)
Dept: LAB | Facility: HOSPITAL | Age: 81
End: 2021-11-15
Attending: INTERNAL MEDICINE
Payer: COMMERCIAL

## 2021-11-15 DIAGNOSIS — Z79.899 ENCOUNTER FOR LONG-TERM (CURRENT) USE OF OTHER MEDICATIONS: ICD-10-CM

## 2021-11-15 DIAGNOSIS — E55.9 VITAMIN D DEFICIENCY: ICD-10-CM

## 2021-11-15 DIAGNOSIS — R60.9 EDEMA, UNSPECIFIED TYPE: Primary | ICD-10-CM

## 2021-11-15 DIAGNOSIS — I10 HYPERTENSION, UNSPECIFIED TYPE: ICD-10-CM

## 2021-11-15 LAB
25(OH)D3 SERPL-MCNC: 34.3 NG/ML (ref 30–100)
ALBUMIN SERPL BCP-MCNC: 3.9 G/DL (ref 3.5–5.7)
ALP SERPL-CCNC: 65 U/L (ref 55–165)
ALT SERPL W P-5'-P-CCNC: 15 U/L (ref 7–52)
ANION GAP SERPL CALCULATED.3IONS-SCNC: 4 MMOL/L (ref 4–13)
AST SERPL W P-5'-P-CCNC: 17 U/L (ref 13–39)
BILIRUB SERPL-MCNC: 0.6 MG/DL (ref 0.2–1)
BNP SERPL-MCNC: 74 PG/ML (ref 1–100)
BUN SERPL-MCNC: 25 MG/DL (ref 7–25)
CALCIUM SERPL-MCNC: 9.2 MG/DL (ref 8.6–10.5)
CHLORIDE SERPL-SCNC: 104 MMOL/L (ref 98–107)
CO2 SERPL-SCNC: 32 MMOL/L (ref 21–31)
CREAT SERPL-MCNC: 1.43 MG/DL (ref 0.7–1.3)
ERYTHROCYTE [DISTWIDTH] IN BLOOD BY AUTOMATED COUNT: 13.8 % (ref 11.5–14.5)
GFR SERPL CREATININE-BSD FRML MDRD: 46 ML/MIN/1.73SQ M
GLUCOSE P FAST SERPL-MCNC: 88 MG/DL (ref 65–99)
HCT VFR BLD AUTO: 43.2 % (ref 42–47)
HGB BLD-MCNC: 14.1 G/DL (ref 14–18)
LDLC SERPL DIRECT ASSAY-MCNC: 98 MG/DL (ref 0–100)
MCH RBC QN AUTO: 30.4 PG (ref 26–34)
MCHC RBC AUTO-ENTMCNC: 32.7 G/DL (ref 31–37)
MCV RBC AUTO: 93 FL (ref 81–99)
PLATELET # BLD AUTO: 160 THOUSANDS/UL (ref 149–390)
PMV BLD AUTO: 8.6 FL (ref 8.6–11.7)
POTASSIUM SERPL-SCNC: 4.6 MMOL/L (ref 3.5–5.5)
PROT SERPL-MCNC: 6.6 G/DL (ref 6.4–8.9)
RBC # BLD AUTO: 4.66 MILLION/UL (ref 4.3–5.9)
SODIUM SERPL-SCNC: 140 MMOL/L (ref 134–143)
WBC # BLD AUTO: 3.6 THOUSAND/UL (ref 4.8–10.8)

## 2021-11-15 PROCEDURE — 83721 ASSAY OF BLOOD LIPOPROTEIN: CPT

## 2021-11-15 PROCEDURE — 80053 COMPREHEN METABOLIC PANEL: CPT

## 2021-11-15 PROCEDURE — 83880 ASSAY OF NATRIURETIC PEPTIDE: CPT

## 2021-11-15 PROCEDURE — 36415 COLL VENOUS BLD VENIPUNCTURE: CPT

## 2021-11-15 PROCEDURE — 82306 VITAMIN D 25 HYDROXY: CPT

## 2021-11-15 PROCEDURE — 85027 COMPLETE CBC AUTOMATED: CPT

## 2021-12-13 ENCOUNTER — HOSPITAL ENCOUNTER (OUTPATIENT)
Dept: NON INVASIVE DIAGNOSTICS | Facility: CLINIC | Age: 81
Discharge: HOME | End: 2021-12-13
Payer: COMMERCIAL

## 2021-12-13 VITALS
SYSTOLIC BLOOD PRESSURE: 122 MMHG | DIASTOLIC BLOOD PRESSURE: 56 MMHG | HEART RATE: 58 BPM | WEIGHT: 224 LBS | HEIGHT: 72 IN | BODY MASS INDEX: 30.34 KG/M2

## 2021-12-13 DIAGNOSIS — I71.2 THORACIC AORTIC ANEURYSM WITHOUT RUPTURE (HCC): ICD-10-CM

## 2021-12-13 LAB
AORTIC ROOT: 4.2 CM
AORTIC VALVE MEAN VELOCITY: 12.7 M/S
APICAL FOUR CHAMBER EJECTION FRACTION: 52 %
ASCENDING AORTA: 4.2 CM
AV LVOT MEAN GRADIENT: 2 MMHG
AV LVOT PEAK GRADIENT: 3 MMHG
AV MEAN GRADIENT: 7 MMHG
AV PEAK GRADIENT: 13 MMHG
DOP CALC AO VTI: 44.04 CM
DOP CALC LVOT PEAK VEL VTI: 21.8 CM
DOP CALC LVOT PEAK VEL: 0.83 M/S
E WAVE DECELERATION TIME: 235 MS
FRACTIONAL SHORTENING: 29 % (ref 28–44)
INTERVENTRICULAR SEPTUM IN DIASTOLE (PARASTERNAL SHORT AXIS VIEW): 1.2 CM
LEFT ATRIUM AREA SYSTOLE SINGLE PLANE A4C: 28.1 CM2
LEFT INTERNAL DIMENSION IN SYSTOLE: 3.4 CM (ref 2.1–4)
LEFT VENTRICULAR INTERNAL DIMENSION IN DIASTOLE: 4.8 CM (ref 7.3–10.88)
LEFT VENTRICULAR POSTERIOR WALL IN END DIASTOLE: 1.2 CM
LEFT VENTRICULAR STROKE VOLUME: 59 ML
MV E'TISSUE VEL-SEP: 9 CM/S
MV PEAK A VEL: 0.91 M/S
MV PEAK E VEL: 95 CM/S
MV STENOSIS PRESSURE HALF TIME: 0 MS
RIGHT ATRIUM AREA SYSTOLE A4C: 20.5 CM2
RIGHT VENTRICLE ID DIMENSION: 4 CM
SL CV LV EF: 55
SL CV PED ECHO LEFT VENTRICLE DIASTOLIC VOLUME (MOD BIPLANE) 2D: 107 ML
SL CV PED ECHO LEFT VENTRICLE SYSTOLIC VOLUME (MOD BIPLANE) 2D: 48 ML
TRICUSPID VALVE PEAK REGURGITATION VELOCITY: 2.01 M/S
TRICUSPID VALVE S': 0.8 CM/S
TV PEAK GRADIENT: 16 MMHG
Z-SCORE OF LEFT VENTRICULAR DIMENSION IN END SYSTOLE: -6.19

## 2021-12-13 PROCEDURE — 93306 TTE W/DOPPLER COMPLETE: CPT

## 2021-12-13 PROCEDURE — 93306 TTE W/DOPPLER COMPLETE: CPT | Performed by: INTERNAL MEDICINE

## 2021-12-20 ENCOUNTER — HOSPITAL ENCOUNTER (OUTPATIENT)
Dept: NON INVASIVE DIAGNOSTICS | Facility: CLINIC | Age: 81
Discharge: HOME | End: 2021-12-20
Payer: COMMERCIAL

## 2021-12-20 DIAGNOSIS — R00.0 TACHYCARDIA: ICD-10-CM

## 2021-12-20 DIAGNOSIS — R00.0 TACHYCARDIA: Primary | ICD-10-CM

## 2021-12-20 PROCEDURE — 93226 XTRNL ECG REC<48 HR SCAN A/R: CPT

## 2021-12-20 PROCEDURE — 93225 XTRNL ECG REC<48 HRS REC: CPT

## 2021-12-29 PROCEDURE — 93227 XTRNL ECG REC<48 HR R&I: CPT | Performed by: INTERNAL MEDICINE

## 2022-01-25 ENCOUNTER — OFFICE VISIT (OUTPATIENT)
Dept: CARDIOLOGY CLINIC | Facility: CLINIC | Age: 82
End: 2022-01-25
Payer: COMMERCIAL

## 2022-01-25 VITALS
WEIGHT: 231 LBS | HEART RATE: 56 BPM | BODY MASS INDEX: 31.29 KG/M2 | DIASTOLIC BLOOD PRESSURE: 74 MMHG | SYSTOLIC BLOOD PRESSURE: 142 MMHG | HEIGHT: 72 IN

## 2022-01-25 DIAGNOSIS — I71.2 THORACIC AORTIC ANEURYSM WITHOUT RUPTURE (HCC): ICD-10-CM

## 2022-01-25 DIAGNOSIS — I49.3 PVC (PREMATURE VENTRICULAR CONTRACTION): Primary | ICD-10-CM

## 2022-01-25 PROCEDURE — 99214 OFFICE O/P EST MOD 30 MIN: CPT | Performed by: INTERNAL MEDICINE

## 2022-01-25 NOTE — ASSESSMENT & PLAN NOTE
Frequent by recent monitor  He states that since the monitor was taken off much less frequent however  I do not believe that flecainide has really changed anything too much and this will be discontinued

## 2022-01-25 NOTE — PROGRESS NOTES
Patient ID: Carmen Lamar is a [de-identified] y o  male  Plan:      PVC (premature ventricular contraction)  Frequent by recent monitor  He states that since the monitor was taken off much less frequent however  I do not believe that flecainide has really changed anything too much and this will be discontinued  Thoracic aortic aneurysm [facility])  Will reassess by echo next year  Follow up Plan/Other summary comments:  One year EKG, echo, and follow-up visit  In the meantime flecainide is being discontinued as ineffective  HPI:  Patient is seen in follow-up today regarding the above  Since the last visit he has felt reasonably okay  He had frequent sense of palpitations  A monitor revealed lots of ectopy and in fact more common ectopy on flecainide then off  However since taking the monitor off ectopy seems to have resolved at least symptomatically  Most recent or relevant cardiac/vascular testing:    Holter report 12/09/2020:  3 5% of beats are ventricular ectopic  There was a 48 beat run of very slow ventricular tachycardia  Holter report 12/20/2021:  Heart rate  beats per minute  9 6% of beats were PVCs with a single 5 beat run  TTE 12/18/2020:  4 3 cm ascending aorta  Otherwise normal   TTE 12/20/2021:  Normal LVEF  Aortic root and ascending aorta  4 2 cm  Myoview 12/29/2020:  Normal EF  No prior infarct  No ischemia  Lots of accelerated idioventricular rhythm when he was monitored for the test   This seemed to raine with Lexiscan      Past Surgical History:   Procedure Laterality Date    EYE SURGERY      HERNIA REPAIR      ROTATOR CUFF REPAIR       CMP:   Lab Results   Component Value Date     12/31/2013    K 4 6 11/15/2021    K 4 1 12/31/2013     11/15/2021     12/31/2013    CO2 32 (H) 11/15/2021    CO2 27 12/31/2013    BUN 25 11/15/2021    BUN 23 12/31/2013    CREATININE 1 43 (H) 11/15/2021    CREATININE 1 11 12/31/2013    GLUCOSE 92 12/31/2013 EGFR 46 11/15/2021       Lipid Profile:   Lab Results   Component Value Date    TRIG 91 05/12/2021    HDL 43 05/12/2021         Review of Systems   10  point ROS  was otherwise non pertinent or negative except as per HPI or as below  Gait: Normal          Objective:     /74   Pulse 56   Ht 6' (1 829 m)   Wt 105 kg (231 lb)   BMI 31 33 kg/m²     PHYSICAL EXAM:    General:  Normal appearance in no distress  Eyes:  Anicteric  Oral mucosa:  Moist   Neck:  No JVD  Carotid upstrokes are brisk without bruits  No masses  Chest:  Clear to auscultation  Cardiac:  No palpable PMI  Normal S1 and S2  No murmur gallop or rub  Abdomen:  Soft and nontender  No palpable organomegaly or aortic enlargement  Extremities:  No peripheral edema  Musculoskeletal:  Symmetric  Vascular:  Femoral pulses are brisk without bruits  Popliteal pulses are intact bilaterally  Pedal pulses are intact  Neuro:  Grossly symmetric  Psych:  Alert and oriented x3          Current Outpatient Medications:     aspirin (ASPIR-LOW) 81 mg EC tablet, Take 1 tablet by mouth daily, Disp: , Rfl:     Cholecalciferol (CVS VITAMIN D) 2000 units CAPS, Take 1 capsule by mouth daily, Disp: , Rfl:     famotidine (PEPCID) 20 mg tablet, Take 20 mg by mouth 2 (two) times a day, Disp: , Rfl:     levothyroxine 75 mcg tablet, Take 1 tablet by mouth daily, Disp: , Rfl:     tamsulosin (FLOMAX) 0 4 mg, tamsulosin 0 4 mg capsule, Disp: , Rfl:   Allergies   Allergen Reactions    Clarithromycin Other (See Comments)     biaxin     Past Medical History:   Diagnosis Date    BPH (benign prostatic hyperplasia)     Disease of thyroid gland     GERD (gastroesophageal reflux disease)            Social History     Tobacco Use   Smoking Status Never Smoker   Smokeless Tobacco Never Used

## 2022-02-21 ENCOUNTER — OFFICE VISIT (OUTPATIENT)
Dept: CARDIOLOGY CLINIC | Facility: CLINIC | Age: 82
End: 2022-02-21
Payer: COMMERCIAL

## 2022-02-21 VITALS
DIASTOLIC BLOOD PRESSURE: 80 MMHG | SYSTOLIC BLOOD PRESSURE: 154 MMHG | WEIGHT: 227 LBS | HEART RATE: 60 BPM | HEIGHT: 72 IN | BODY MASS INDEX: 30.75 KG/M2

## 2022-02-21 DIAGNOSIS — I49.3 PVC (PREMATURE VENTRICULAR CONTRACTION): Primary | ICD-10-CM

## 2022-02-21 DIAGNOSIS — R00.0 TACHYCARDIA: ICD-10-CM

## 2022-02-21 DIAGNOSIS — I71.2 THORACIC AORTIC ANEURYSM WITHOUT RUPTURE (HCC): ICD-10-CM

## 2022-02-21 PROCEDURE — 99214 OFFICE O/P EST MOD 30 MIN: CPT | Performed by: INTERNAL MEDICINE

## 2022-02-21 RX ORDER — VERAPAMIL HYDROCHLORIDE 240 MG/1
240 TABLET, FILM COATED, EXTENDED RELEASE ORAL DAILY
Qty: 90 TABLET | Refills: 5 | Status: SHIPPED | OUTPATIENT
Start: 2022-02-21

## 2022-02-21 NOTE — ASSESSMENT & PLAN NOTE
Symptomatic  There is a waxing and waning quality of this  I tried to reassure him again  Will trial verapamil  Resting heart rate often a little bit on the low side to trial beta-blocker  Flecainide did not really seem to be effective  Other options can be considered but as long as LV function remains normal I would rather not get too aggressive here

## 2022-02-21 NOTE — PROGRESS NOTES
Patient ID: Bernie Xavier is a [de-identified] y o  male  Plan:      PVC (premature ventricular contraction)  Symptomatic  There is a waxing and waning quality of this  I tried to reassure him again  Will trial verapamil  Resting heart rate often a little bit on the low side to trial beta-blocker  Flecainide did not really seem to be effective  Other options can be considered but as long as LV function remains normal I would rather not get too aggressive here  Thoracic aortic aneurysm [facility])  Will reassess by echo next year  Tachycardia  Sense of tachycardia but really more accurately skipped beats  Again I tried to reassure him about this today  Follow up Plan/Other summary comments:  Two month return visit to discuss efficacy of verapamil  HPI:  Patient is seen today as he continues to have skipped beats  He was talking to a care navigator from his insurance company and therefore wanted to discuss further  No lightheadedness  No syncope or near syncope  Again, he has had intermittent feeling of skipped beats and this is been present for at least a few years  Sometimes he gets short of breath  In general however the feeling of skipped beats do not occur while he is busy  No angina  No syncope or near syncope  Most recent or relevant cardiac/vascular testing:     Holter report 12/09/2020:  3 5% of beats are ventricular ectopic   There was a 48 beat run of very slow ventricular tachycardia  Holter report 12/20/2021:  Heart rate  beats per minute  9 6% of beats were PVCs with a single 5 beat run  TTE 12/18/2020:  4 3 cm ascending aorta   Otherwise normal   TTE 12/20/2021:  Normal LVEF  Aortic root and ascending aorta  4 2 cm     Myoview 12/29/2020:  Normal EF   No prior infarct   No ischemia   Lots of accelerated idioventricular rhythm when he was monitored for the test   This seemed to raine with Lexiscan      Past Surgical History:   Procedure Laterality Date    EYE SURGERY      HERNIA REPAIR      ROTATOR CUFF REPAIR       CMP:   Lab Results   Component Value Date     12/31/2013    K 4 6 11/15/2021    K 4 1 12/31/2013     11/15/2021     12/31/2013    CO2 32 (H) 11/15/2021    CO2 27 12/31/2013    BUN 25 11/15/2021    BUN 23 12/31/2013    CREATININE 1 43 (H) 11/15/2021    CREATININE 1 11 12/31/2013    GLUCOSE 92 12/31/2013    EGFR 46 11/15/2021       Lipid Profile:   Lab Results   Component Value Date    TRIG 91 05/12/2021    HDL 43 05/12/2021         Review of Systems   10  point ROS  was otherwise non pertinent or negative except as per HPI or as below  Gait: Normal         Objective:     /80   Pulse 60   Ht 6' (1 829 m)   Wt 103 kg (227 lb)   BMI 30 79 kg/m²     PHYSICAL EXAM:    General:  Normal appearance in no distress  Eyes:  Anicteric  Oral mucosa:  Moist   Neck:  No JVD  Carotid upstrokes are brisk without bruits  No masses  Chest:  Clear to auscultation  Cardiac:  No palpable PMI  Normal S1 and S2  No murmur gallop or rub  Abdomen:  Soft and nontender  No palpable organomegaly or aortic enlargement  Extremities:  No peripheral edema  Musculoskeletal:  Symmetric  Vascular:  Femoral pulses are brisk without bruits  Popliteal pulses are intact bilaterally  Pedal pulses are intact  Neuro:  Grossly symmetric  Psych:  Alert and oriented x3          Current Outpatient Medications:     aspirin (ASPIR-LOW) 81 mg EC tablet, Take 1 tablet by mouth daily, Disp: , Rfl:     Cholecalciferol (CVS VITAMIN D) 2000 units CAPS, Take 1 capsule by mouth daily, Disp: , Rfl:     famotidine (PEPCID) 20 mg tablet, Take 20 mg by mouth 2 (two) times a day, Disp: , Rfl:     levothyroxine 75 mcg tablet, Take 1 tablet by mouth daily, Disp: , Rfl:     tamsulosin (FLOMAX) 0 4 mg, tamsulosin 0 4 mg capsule, Disp: , Rfl:     verapamil (CALAN-SR) 240 mg CR tablet, Take 1 tablet (240 mg total) by mouth in the morning, Disp: 90 tablet, Rfl: 5  Allergies Allergen Reactions    Clarithromycin Other (See Comments)     biaxin     Past Medical History:   Diagnosis Date    BPH (benign prostatic hyperplasia)     Disease of thyroid gland     GERD (gastroesophageal reflux disease)     PVC's (premature ventricular contractions)            Social History     Tobacco Use   Smoking Status Never Smoker   Smokeless Tobacco Never Used

## 2022-02-21 NOTE — ASSESSMENT & PLAN NOTE
Sense of tachycardia but really more accurately skipped beats  Again I tried to reassure him about this today

## 2022-03-01 ENCOUNTER — TELEPHONE (OUTPATIENT)
Dept: CARDIOLOGY CLINIC | Facility: CLINIC | Age: 82
End: 2022-03-01

## 2022-03-01 NOTE — TELEPHONE ENCOUNTER
Pts daughter in law called today stating that since starting on Verapamil pt has been having dizzy spells  He decided to check his bp today to see if it correlated with his symptoms  His bp was 86/52, he ate some food and it went up to 127/63  Pt told daughter in law he is not comfortable continuing this medication, states today is the last day he will take it  Recs? ?

## 2022-03-03 ENCOUNTER — OFFICE VISIT (OUTPATIENT)
Dept: CARDIOLOGY CLINIC | Facility: CLINIC | Age: 82
End: 2022-03-03
Payer: COMMERCIAL

## 2022-03-03 VITALS
DIASTOLIC BLOOD PRESSURE: 70 MMHG | WEIGHT: 228 LBS | HEIGHT: 72 IN | SYSTOLIC BLOOD PRESSURE: 146 MMHG | RESPIRATION RATE: 16 BRPM | HEART RATE: 54 BPM | OXYGEN SATURATION: 97 % | BODY MASS INDEX: 30.88 KG/M2

## 2022-03-03 DIAGNOSIS — R06.00 DYSPNEA, UNSPECIFIED TYPE: Primary | ICD-10-CM

## 2022-03-03 PROCEDURE — 99214 OFFICE O/P EST MOD 30 MIN: CPT | Performed by: INTERNAL MEDICINE

## 2022-03-03 NOTE — PROGRESS NOTES
Cardiology Follow Up    Pablo Fagan  1940  [id number]  [facility] [id number]  [phone]  [phone]    1  Dyspnea, unspecified type  -     Complete PFT with post bronchodilator  -     Ambulatory Referral to Pulmonology  -     XR chest pa & lateral; Future; Expected date: 03/03/2022          Interval History:  Patient's primary complaint is shortness of breath  This began on a hunting trip in November of 2020  Interestingly, when he sleeps on his left side he breathes better  He was told in the past that there was a problem with collapse of 1 of his lobes of his left lung  Previous chest x-ray has shown atelectasis and an elevated left hemidiaphragm-probable diaphragmatic paralysis  He does not get exertionally related chest discomfort  CT of the chest has shown coronary calcifications but a stress test has been negative  He has been treated for very frequent PVCs  He notices skipped beats    He has not passed out      Patient Active Problem List   Diagnosis    Personal history of malignant melanoma    Diaphragm paralysis    Dyspnea and respiratory abnormality    Encounter for follow-up surveillance of melanoma    Tachycardia    Vascular abnormality    Cardiomegaly    PVC (premature ventricular contraction)    Thoracic aortic aneurysm (HCC)     Past Medical History:   Diagnosis Date    BPH (benign prostatic hyperplasia)     Disease of thyroid gland     GERD (gastroesophageal reflux disease)     PVC's (premature ventricular contractions)      Social History     Socioeconomic History    Marital status: /Civil Union     Spouse name: Not on file    Number of children: Not on file    Years of education: Not on file    Highest education level: Not on file   Occupational History    Not on file   Tobacco Use    Smoking status: Never Smoker    Smokeless tobacco: Never Used   Vaping Use    Vaping Use: Never used   Substance and Sexual Activity    Alcohol use: Yes    Drug use: No    Sexual activity: Yes   Other Topics Concern    Not on file   Social History Narrative    Not on file     Social Determinants of Health     Financial Resource Strain: Not on file   Food Insecurity: Not on file   Transportation Needs: Not on file   Physical Activity: Not on file   Stress: Not on file   Social Connections: Not on file   Intimate Partner Violence: Not on file   Housing Stability: Not on file      History reviewed  No pertinent family history  Past Surgical History:   Procedure Laterality Date    EYE SURGERY      HERNIA REPAIR      ROTATOR CUFF REPAIR         Current Outpatient Medications:     aspirin (ASPIR-LOW) 81 mg EC tablet, Take 1 tablet by mouth daily, Disp: , Rfl:     Cholecalciferol (CVS VITAMIN D) 2000 units CAPS, Take 1 capsule by mouth daily, Disp: , Rfl:     famotidine (PEPCID) 20 mg tablet, Take 20 mg by mouth 2 (two) times a day, Disp: , Rfl:     levothyroxine 75 mcg tablet, Take 1 tablet by mouth daily, Disp: , Rfl:     tamsulosin (FLOMAX) 0 4 mg, tamsulosin 0 4 mg capsule, Disp: , Rfl:     verapamil (CALAN-SR) 240 mg CR tablet, Take 1 tablet (240 mg total) by mouth in the morning (Patient not taking: Reported on 3/3/2022 ), Disp: 90 tablet, Rfl: 5  Allergies   Allergen Reactions    Clarithromycin Other (See Comments)     biaxin       Labs:not applicable  Imaging: No results found  Review of Systems:  Review of Systems    Physical Exam:  146/70  He is overweight  Skin is warm dry  Pupils equal   Carotids 2+ without bruits  Lungs reveal markedly diminished breath sounds in the left base  Right lung is clear  Rhythm is regular  There are frequent extrasystoles  There is grade 2 short systolic ejection murmur at the base  Pulses are good  There are no abdominal masses  There is trace peripheral edema  Neck veins are not distended    There is good strength in all extremities  Discussion/Summary:    1  Shortness of breath most likely related to paralysis of the left hemidiaphragm and or some other pulmonary pathology  2  Coronary artery disease without angina pectoris  3  History of dilated ascending aorta-stable  4  Aortic sclerosis    Recommendations:    1  Chest x-ray  2  Pulmonary function tests in a  3  Refer to pulmonologist for further evaluation  4   Follow-up 2 months       Alison Bennett MD

## 2022-03-09 ENCOUNTER — HOSPITAL ENCOUNTER (OUTPATIENT)
Dept: PULMONOLOGY | Facility: HOSPITAL | Age: 82
Discharge: HOME | End: 2022-03-09
Attending: INTERNAL MEDICINE
Payer: COMMERCIAL

## 2022-03-09 ENCOUNTER — HOSPITAL ENCOUNTER (OUTPATIENT)
Dept: RADIOLOGY | Facility: HOSPITAL | Age: 82
Discharge: HOME | End: 2022-03-09
Attending: INTERNAL MEDICINE
Payer: COMMERCIAL

## 2022-03-09 DIAGNOSIS — R06.00 DYSPNEA, UNSPECIFIED TYPE: ICD-10-CM

## 2022-03-09 PROCEDURE — 94060 EVALUATION OF WHEEZING: CPT

## 2022-03-09 PROCEDURE — 94729 DIFFUSING CAPACITY: CPT | Performed by: INTERNAL MEDICINE

## 2022-03-09 PROCEDURE — 94060 EVALUATION OF WHEEZING: CPT | Performed by: INTERNAL MEDICINE

## 2022-03-09 PROCEDURE — 94726 PLETHYSMOGRAPHY LUNG VOLUMES: CPT

## 2022-03-09 PROCEDURE — 71046 X-RAY EXAM CHEST 2 VIEWS: CPT

## 2022-03-09 PROCEDURE — 94729 DIFFUSING CAPACITY: CPT

## 2022-03-09 PROCEDURE — 94760 N-INVAS EAR/PLS OXIMETRY 1: CPT

## 2022-03-09 PROCEDURE — 94726 PLETHYSMOGRAPHY LUNG VOLUMES: CPT | Performed by: INTERNAL MEDICINE

## 2022-03-09 RX ORDER — ALBUTEROL SULFATE 2.5 MG/3ML
2.5 SOLUTION RESPIRATORY (INHALATION) ONCE AS NEEDED
Status: COMPLETED | OUTPATIENT
Start: 2022-03-09 | End: 2022-03-09

## 2022-03-09 RX ADMIN — ALBUTEROL SULFATE 2.5 MG: 2.5 SOLUTION RESPIRATORY (INHALATION) at 14:20

## 2022-03-30 ENCOUNTER — CONSULT (OUTPATIENT)
Dept: PULMONOLOGY | Facility: CLINIC | Age: 82
End: 2022-03-30
Payer: COMMERCIAL

## 2022-03-30 ENCOUNTER — TELEPHONE (OUTPATIENT)
Dept: PULMONOLOGY | Facility: CLINIC | Age: 82
End: 2022-03-30

## 2022-03-30 VITALS
BODY MASS INDEX: 30.88 KG/M2 | OXYGEN SATURATION: 96 % | SYSTOLIC BLOOD PRESSURE: 130 MMHG | DIASTOLIC BLOOD PRESSURE: 62 MMHG | HEIGHT: 72 IN | TEMPERATURE: 97.2 F | HEART RATE: 75 BPM | WEIGHT: 228 LBS | RESPIRATION RATE: 18 BRPM

## 2022-03-30 DIAGNOSIS — R94.2 ABNORMAL PFT: ICD-10-CM

## 2022-03-30 DIAGNOSIS — R06.00 DOE (DYSPNEA ON EXERTION): Primary | ICD-10-CM

## 2022-03-30 DIAGNOSIS — J98.6 PARALYZED HEMIDIAPHRAGM: ICD-10-CM

## 2022-03-30 DIAGNOSIS — J98.4 RESTRICTIVE LUNG DISEASE: ICD-10-CM

## 2022-03-30 PROCEDURE — 94618 PULMONARY STRESS TESTING: CPT | Performed by: INTERNAL MEDICINE

## 2022-03-30 PROCEDURE — 99204 OFFICE O/P NEW MOD 45 MIN: CPT | Performed by: INTERNAL MEDICINE

## 2022-03-30 NOTE — PROGRESS NOTES
Pulmonary Consultation   Tian Adler [de-identified] y o  male MRN: [id number]    Encounter: [id number]      Reason for consultation:   Dyspnea on exertion    Requesting physician:  Linda Hopkins MD       Impressions:   · Dyspnea exertion  · Left hemidiaphragm paralysis  · Abnormal PFTs  · Restrictive lung disease  · Obesity  Recommendations:  · 6 minutes walk test   · Regular walks and exercise to improve stamina  · Follow-up as needed  Discussion:  The patient's dyspnea on exertion is most likely due to deconditioning  His PFTs were of normal due to the elevated left hemidiaphragm causing restrictive pattern  This is a chronic problem and has not changed  He did not desaturate on the 6 minutes walk test   He had no other pulmonary findings on his imaging studies  I have reassured the patient that he does have underlying pulmonary disease causing his symptoms  His symptoms are most likely due to deconditioning  I have advised him to take regular walks and exercise to improve his stamina  He will follow-up with Dr Jennifer Grove in 6 weeks  I have not scheduled him for another appointment however I will be happy to see him again if his symptoms do not improve and at that point will do a cardiopulmonary exercise test               History of Present Illness   HPI:  Tian Adler is a [de-identified] y o  male who is here for evaluation shortness of on exertion  The patient stated that he has chronic shortness of breath on exertion for the last 20 years  However, over the last 2 years it has progressively worsened  He is limited by his breathing  He denies cough, wheezing or sputum production  Denies any chest pain or palpitations  He has difficulty sleeping supine  He does not have significant cardiac history  He had a stress test in December of 2020  It was unremarkable  His 2D echo showed preserved LV function  No evidence of pulmonary hypertension      Review of systems:  12 point review of systems was completed and was otherwise negative except as listed in HPI  Historical Information   Past Medical History:   Diagnosis Date    BPH (benign prostatic hyperplasia)     Disease of thyroid gland     GERD (gastroesophageal reflux disease)     PVC's (premature ventricular contractions)      Past Surgical History:   Procedure Laterality Date    EYE SURGERY      HERNIA REPAIR      ROTATOR CUFF REPAIR       History reviewed  No pertinent family history  Family History:  No family history of chronic lung disease  Social History:  The patient has no significant smoking history  Care he is retired  He worked for [employer]/Allergies   No current facility-administered medications for this visit  (Not in a hospital admission)    Allergies   Allergen Reactions    Clarithromycin Other (See Comments)     biaxin       Vitals: Blood pressure 130/62, pulse 75, temperature (!) 97 2 °F (36 2 °C), resp  rate 18, height 6' (1 829 m), weight 103 kg (228 lb), SpO2 96 %  ,      Physical exam:        Head/eyes:    Normocephalic, without obvious abnormality, atraumatic,         PERRL, extraocular muscles intact, no scleral icterus    Nose:   Nares normal, septum midline, mucosa normal, no drainage    or sinus tenderness   Throat:   Moist mucous membranes, no thrush   Neck:   Supple, trachea midline, no adenopathy; no carotid    bruit or JVD   Lungs:     No breath sounds on the left base  No wheezing or rhonchi  Heart:    Regular rate and rhythm, S1 and S2 normal, no murmur, rub   or gallop   Abdomen:     Soft, non-tender, bowel sounds active all four quadrants,     no masses, no organomegaly   Extremities:   Extremities normal, atraumatic, no cyanosis    2+ edema   Skin:   Warm, dry, turgor normal, no rashes or lesions   Neurologic:   CNII-XII intact, normal strength, non-focal             Imaging and other studies: I have personally reviewed pertinent films in PACS chest x-ray is reviewed on the PAC system and compared to the study from 2013  He has elevated left hemidiaphragm that has not changed since 2013  No acute pulmonary findings  Complete pulmonary function test showed reduced total lung capacity  Restrictive pattern on the spirometry  No significant improvement in airflow or vital capacity following the administration of bronchodilators  Normal diffusion capacity  Normal airway resistance as indicated by the specific airway conductance  Lab Results   Component Value Date    BNP 74 11/15/2021      Lab Results   Component Value Date    SODIUM 140 11/15/2021    K 4 6 11/15/2021     11/15/2021    CO2 32 (H) 11/15/2021    BUN 25 11/15/2021    CREATININE 1 43 (H) 11/15/2021    GLUC 137 (H) 12/02/2020    CALCIUM 9 2 11/15/2021     Lab Results   Component Value Date    WBC 3 60 (L) 11/15/2021    HGB 14 1 11/15/2021    HCT 43 2 11/15/2021    MCV 93 11/15/2021     11/15/2021     6 min walk test was done today in the office  The patient started with a heart rate of 52 beats per minute and O2 saturation 96%  At the end of the test heart rate was 97 beats per minute and the O2 saturation 94%  He walked 336 m  2D echo report is reviewed  It showed preserved LV function  No evidence of pulmonary hypertension  No significant valvular heart disease        Jovita Hammans, MD

## 2022-03-30 NOTE — TELEPHONE ENCOUNTER
Pt's daughter-in-law, Ruth Tucker, called re: she was told by Dr Lilly Pavon MA to call with DME info to D/C O2  DME: [employer]  Phone #: [phone]  Customer ID#: [id number]    If you need any more information please directly Umberto You @ [phone]

## 2022-04-01 ENCOUNTER — TELEPHONE (OUTPATIENT)
Dept: PULMONOLOGY | Facility: CLINIC | Age: 82
End: 2022-04-01

## 2022-04-01 NOTE — TELEPHONE ENCOUNTER
Patient's daughter calling stating last night Sang Machado felt short of breath while he was sleeping and his oxygen level was in the low 80s and he put his oxygen on and he felt better and his oxygen level went back up  He really doesn't need oxygen during the day which was shown on his 6mw  But, she is asking if he can keep the oxygen for when he needs it  He has never had an overnight pulse ox   Please advise [phone]

## 2022-04-04 DIAGNOSIS — R06.02 SHORTNESS OF BREATH: Primary | ICD-10-CM

## 2022-04-04 NOTE — TELEPHONE ENCOUNTER
Amanda Side,    Please call the patient and let him know that I have ordered an overnight pulse ox to do on room air prior to returning oxygen  Thank you  If he is feeling sick, he needs a sick visit      MARY ELLEN Mccurdy

## 2022-04-05 NOTE — TELEPHONE ENCOUNTER
Spoke to Dipika Daniel from [facility] informed her that an overnight order was sent over  She confirmed it was received  They will leave the o2 with patient until results are in  Patient is aware as well as daughter- in-law Peter Crespo

## 2022-05-10 ENCOUNTER — OFFICE VISIT (OUTPATIENT)
Dept: CARDIOLOGY CLINIC | Facility: CLINIC | Age: 82
End: 2022-05-10
Payer: COMMERCIAL

## 2022-05-10 ENCOUNTER — TELEPHONE (OUTPATIENT)
Dept: PULMONOLOGY | Facility: CLINIC | Age: 82
End: 2022-05-10

## 2022-05-10 VITALS
OXYGEN SATURATION: 97 % | HEIGHT: 72 IN | WEIGHT: 221 LBS | HEART RATE: 52 BPM | BODY MASS INDEX: 29.93 KG/M2 | RESPIRATION RATE: 16 BRPM | DIASTOLIC BLOOD PRESSURE: 74 MMHG | SYSTOLIC BLOOD PRESSURE: 142 MMHG

## 2022-05-10 DIAGNOSIS — R06.02 SHORTNESS OF BREATH: Primary | ICD-10-CM

## 2022-05-10 DIAGNOSIS — I35.0 NONRHEUMATIC AORTIC VALVE STENOSIS: ICD-10-CM

## 2022-05-10 DIAGNOSIS — I25.10 CORONARY ARTERY DISEASE INVOLVING NATIVE HEART WITHOUT ANGINA PECTORIS, UNSPECIFIED VESSEL OR LESION TYPE: Primary | ICD-10-CM

## 2022-05-10 PROCEDURE — 99213 OFFICE O/P EST LOW 20 MIN: CPT | Performed by: INTERNAL MEDICINE

## 2022-05-10 NOTE — TELEPHONE ENCOUNTER
Mery Vieira is calling in for pt, at his last office visit it was discussed that he if he wasn't doing better we could consider doing a "Pulmonary Cardio Exercise Test " He is interested in getting this done and would appreciate it if we can get it ordered and set up   Please advise
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
## 2022-05-23 ENCOUNTER — APPOINTMENT (OUTPATIENT)
Dept: LAB | Facility: HOSPITAL | Age: 82
End: 2022-05-23
Attending: INTERNAL MEDICINE
Payer: COMMERCIAL

## 2022-05-23 DIAGNOSIS — R73.9 HYPERGLYCEMIA: ICD-10-CM

## 2022-05-23 LAB
25(OH)D3 SERPL-MCNC: 35.1 NG/ML (ref 30–100)
ALBUMIN SERPL BCP-MCNC: 3.9 G/DL (ref 3.5–5)
ALP SERPL-CCNC: 70 U/L (ref 34–104)
ALT SERPL W P-5'-P-CCNC: 22 U/L (ref 7–52)
ANION GAP SERPL CALCULATED.3IONS-SCNC: 6 MMOL/L (ref 4–13)
AST SERPL W P-5'-P-CCNC: 24 U/L (ref 13–39)
BILIRUB SERPL-MCNC: 0.62 MG/DL (ref 0.2–1)
BUN SERPL-MCNC: 30 MG/DL (ref 5–25)
CALCIUM SERPL-MCNC: 9 MG/DL (ref 8.4–10.2)
CHLORIDE SERPL-SCNC: 104 MMOL/L (ref 96–108)
CHOLEST SERPL-MCNC: 156 MG/DL
CO2 SERPL-SCNC: 31 MMOL/L (ref 21–32)
CREAT SERPL-MCNC: 1.29 MG/DL (ref 0.6–1.3)
ERYTHROCYTE [DISTWIDTH] IN BLOOD BY AUTOMATED COUNT: 12.8 % (ref 11.6–15.1)
EST. AVERAGE GLUCOSE BLD GHB EST-MCNC: 108 MG/DL
GFR SERPL CREATININE-BSD FRML MDRD: 51 ML/MIN/1.73SQ M
GLUCOSE P FAST SERPL-MCNC: 82 MG/DL (ref 65–99)
HBA1C MFR BLD: 5.4 %
HCT VFR BLD AUTO: 43.7 % (ref 36.5–49.3)
HDLC SERPL-MCNC: 40 MG/DL
HGB BLD-MCNC: 13.7 G/DL (ref 12–17)
LDLC SERPL CALC-MCNC: 102 MG/DL (ref 0–100)
MCH RBC QN AUTO: 29.7 PG (ref 26.8–34.3)
MCHC RBC AUTO-ENTMCNC: 31.4 G/DL (ref 31.4–37.4)
MCV RBC AUTO: 95 FL (ref 82–98)
NONHDLC SERPL-MCNC: 116 MG/DL
PLATELET # BLD AUTO: 172 THOUSANDS/UL (ref 149–390)
PMV BLD AUTO: 10.5 FL (ref 8.9–12.7)
POTASSIUM SERPL-SCNC: 4.5 MMOL/L (ref 3.5–5.3)
PROT SERPL-MCNC: 6.6 G/DL (ref 6.4–8.4)
RBC # BLD AUTO: 4.61 MILLION/UL (ref 3.88–5.62)
SODIUM SERPL-SCNC: 141 MMOL/L (ref 135–147)
T4 FREE SERPL-MCNC: 1.05 NG/DL (ref 0.76–1.46)
TRIGL SERPL-MCNC: 68 MG/DL
TSH SERPL DL<=0.05 MIU/L-ACNC: 1.64 UIU/ML (ref 0.45–4.5)
WBC # BLD AUTO: 3.71 THOUSAND/UL (ref 4.31–10.16)

## 2022-05-23 PROCEDURE — 36415 COLL VENOUS BLD VENIPUNCTURE: CPT

## 2022-05-23 PROCEDURE — 84439 ASSAY OF FREE THYROXINE: CPT

## 2022-05-23 PROCEDURE — 82306 VITAMIN D 25 HYDROXY: CPT

## 2022-05-23 PROCEDURE — 80053 COMPREHEN METABOLIC PANEL: CPT

## 2022-05-23 PROCEDURE — 83036 HEMOGLOBIN GLYCOSYLATED A1C: CPT

## 2022-05-23 PROCEDURE — 80061 LIPID PANEL: CPT

## 2022-05-23 PROCEDURE — 80181 DRUG ASSAY FLECAINIDE: CPT

## 2022-05-23 PROCEDURE — 85027 COMPLETE CBC AUTOMATED: CPT

## 2022-05-23 PROCEDURE — 84443 ASSAY THYROID STIM HORMONE: CPT

## 2022-05-27 LAB — FLECAINIDE SERPL-MCNC: <0.1 UG/ML (ref 0.2–1)

## 2022-06-27 ENCOUNTER — HOSPITAL ENCOUNTER (OUTPATIENT)
Dept: PULMONOLOGY | Facility: HOSPITAL | Age: 82
Discharge: HOME | End: 2022-06-27

## 2022-06-27 DIAGNOSIS — R06.02 SHORTNESS OF BREATH: ICD-10-CM

## 2022-07-05 ENCOUNTER — HOSPITAL ENCOUNTER (OUTPATIENT)
Dept: PULMONOLOGY | Facility: HOSPITAL | Age: 82
Discharge: HOME | End: 2022-07-05
Payer: COMMERCIAL

## 2022-07-05 DIAGNOSIS — R06.02 SHORTNESS OF BREATH: ICD-10-CM

## 2022-07-05 LAB
ARTERIAL PATENCY WRIST A: ABNORMAL
BASE EXCESS BLDA CALC-SCNC: 1 MMOL/L (ref -2–3)
CA-I BLD-SCNC: 1.26 MMOL/L (ref 1.12–1.32)
FIO2 GAS DIL.REBREATH: 21 L
GLUCOSE SERPL-MCNC: 89 MG/DL (ref 65–140)
HCO3 BLDA-SCNC: 26.3 MMOL/L (ref 22–28)
HCT VFR BLD CALC: 41 % (ref 36.5–49.3)
HGB BLDA-MCNC: 13.9 G/DL (ref 12–17)
PCO2 BLD: 28 MMOL/L (ref 21–32)
PCO2 BLD: 44.2 MM HG (ref 36–44)
PH BLD: 7.38 [PH] (ref 7.35–7.45)
PO2 BLD: 78 MM HG (ref 75–129)
POTASSIUM BLD-SCNC: 4.5 MMOL/L (ref 3.5–5.3)
SAMPLE SITE: ABNORMAL
SAO2 % BLD FROM PO2: 95 % (ref 60–85)
SODIUM BLD-SCNC: 140 MMOL/L (ref 136–145)
SPECIMEN SOURCE: ABNORMAL

## 2022-07-05 PROCEDURE — 82803 BLOOD GASES ANY COMBINATION: CPT

## 2022-07-05 PROCEDURE — 84295 ASSAY OF SERUM SODIUM: CPT

## 2022-07-05 PROCEDURE — 94621 CARDIOPULM EXERCISE TESTING: CPT

## 2022-07-05 PROCEDURE — 85014 HEMATOCRIT: CPT

## 2022-07-05 PROCEDURE — 94621 CARDIOPULM EXERCISE TESTING: CPT | Performed by: INTERNAL MEDICINE

## 2022-07-05 PROCEDURE — 82330 ASSAY OF CALCIUM: CPT

## 2022-07-05 PROCEDURE — 84132 ASSAY OF SERUM POTASSIUM: CPT

## 2022-07-05 PROCEDURE — 82947 ASSAY GLUCOSE BLOOD QUANT: CPT

## 2022-07-05 PROCEDURE — 36600 WITHDRAWAL OF ARTERIAL BLOOD: CPT

## 2022-07-08 ENCOUNTER — TELEPHONE (OUTPATIENT)
Dept: PULMONOLOGY | Facility: CLINIC | Age: 82
End: 2022-07-08

## 2022-07-08 NOTE — TELEPHONE ENCOUNTER
Patient has been seen since March please call and schedule follow-up with Dr Mercy Santacruz to review his cardiopulmonary testing

## 2022-07-11 ENCOUNTER — TELEPHONE (OUTPATIENT)
Dept: CARDIOLOGY CLINIC | Facility: CLINIC | Age: 82
End: 2022-07-11

## 2022-07-11 NOTE — TELEPHONE ENCOUNTER
Pt's daughter in law Fco called & stated that pt's pulmonary doctor Dr. Levin would like pt to be seen ASAP. Fco only wants pt to see Dr. Melendez. Please advise if pt can be added to Dr. Melendez scheduled on 7/15/22.     Fco would like a call back at [phone]

## 2022-07-12 NOTE — TELEPHONE ENCOUNTER
2 messages have been sent to Dr Melendez asking if the patient can be scheduled, we have not heard back from him.  Will sent another task.  If daughter in law calls back, please advise her that he is out of the office until Thursday and we are waiting to hear back from him

## 2022-07-12 NOTE — TELEPHONE ENCOUNTER
PT's daughter in law Fco called once again and advised that PT can only see Dr. Melendez. Please advise if PTcan be added to Dr. Melendez schedule on 7/15/22 at the same day slot. Daughter in law Fco would like a call back at [phone] for appt.

## 2022-07-12 NOTE — TELEPHONE ENCOUNTER
PT's daughter in law Fco called again and advised that PT cannot make 7/15/22 date and would like to know if  Dr. Melendez can see the PT on 07/21/22. Dr. Melendez only has a 2:20 same day time slot. Please advise if PTcan be added. Daughter in law Fco would like a call back at [phone] for appt.

## 2022-07-14 ENCOUNTER — TELEPHONE (OUTPATIENT)
Dept: PULMONOLOGY | Facility: CLINIC | Age: 82
End: 2022-07-14

## 2022-07-21 ENCOUNTER — OFFICE VISIT (OUTPATIENT)
Dept: CARDIOLOGY CLINIC | Facility: CLINIC | Age: 82
End: 2022-07-21
Payer: COMMERCIAL

## 2022-07-21 VITALS
DIASTOLIC BLOOD PRESSURE: 60 MMHG | WEIGHT: 218 LBS | HEART RATE: 54 BPM | HEIGHT: 72 IN | BODY MASS INDEX: 29.53 KG/M2 | SYSTOLIC BLOOD PRESSURE: 124 MMHG | RESPIRATION RATE: 16 BRPM | OXYGEN SATURATION: 95 %

## 2022-07-21 DIAGNOSIS — I49.3 PVC (PREMATURE VENTRICULAR CONTRACTION): Primary | ICD-10-CM

## 2022-07-21 DIAGNOSIS — I25.10 CORONARY ARTERY DISEASE INVOLVING NATIVE CORONARY ARTERY OF NATIVE HEART WITHOUT ANGINA PECTORIS: ICD-10-CM

## 2022-07-21 DIAGNOSIS — I71.20 THORACIC AORTIC ANEURYSM WITHOUT RUPTURE: ICD-10-CM

## 2022-07-21 PROCEDURE — 99214 OFFICE O/P EST MOD 30 MIN: CPT | Performed by: INTERNAL MEDICINE

## 2022-07-21 NOTE — PROGRESS NOTES
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  He was evaluated by Pulmonary Dr Jannet Murdock felt there was no significant pulmonary disease  He had a pulmonary stress test and afterwards had some PVCs and his systolic blood pressure went to 220  Does have a history of hypertension  His coronary calcification but has no effort related chest discomfort and has had negative stress test     Patient Active Problem List   Diagnosis    Personal history of malignant melanoma    Diaphragm paralysis    Dyspnea    Encounter for follow-up surveillance of melanoma    Tachycardia    Vascular abnormality    Cardiomegaly    PVC (premature ventricular contraction)    Thoracic aortic aneurysm (HCC)     Past Medical History:   Diagnosis Date    BPH (benign prostatic hyperplasia)     Disease of thyroid gland     GERD (gastroesophageal reflux disease)     PVC's (premature ventricular contractions)      Social History     Socioeconomic History    Marital status: /Civil Union     Spouse name: Not on file    Number of children: Not on file    Years of education: Not on file    Highest education level: Not on file   Occupational History    Not on file   Tobacco Use    Smoking status: Former Smoker     Packs/day: 0 75     Years: 14 00     Pack years: 10 50     Types: Cigarettes     Start date:      Quit date: 1970     Years since quittin 5    Smokeless tobacco: Never Used   Vaping Use    Vaping Use: Never used   Substance and Sexual Activity    Alcohol use:  Yes    Drug use: No    Sexual activity: Yes   Other Topics Concern    Not on file   Social History Narrative    Not on file     Social Determinants of Health     Financial Resource Strain: Not on file   Food Insecurity: Not on file   Transportation Needs: Not on file   Physical Activity: Not on file   Stress: Not on file   Social Connections: Not on file   Intimate Partner Violence: Not on file   Housing Stability: Not on file      History reviewed  No pertinent family history  Past Surgical History:   Procedure Laterality Date    EYE SURGERY      HERNIA REPAIR      ROTATOR CUFF REPAIR         Current Outpatient Medications:     aspirin (ECOTRIN LOW STRENGTH) 81 mg EC tablet, Take 1 tablet by mouth daily, Disp: , Rfl:     Cholecalciferol 50 MCG (2000 UT) CAPS, Take 1 capsule by mouth daily Pt reported taking every other day, Disp: , Rfl:     famotidine (PEPCID) 20 mg tablet, Take 20 mg by mouth 2 (two) times a day, Disp: , Rfl:     levothyroxine 75 mcg tablet, Take 1 tablet by mouth daily, Disp: , Rfl:     tamsulosin (FLOMAX) 0 4 mg, tamsulosin 0 4 mg capsule, Disp: , Rfl:     verapamil (CALAN-SR) 240 mg CR tablet, Take 1 tablet (240 mg total) by mouth in the morning (Patient not taking: No sig reported), Disp: 90 tablet, Rfl: 5  Allergies   Allergen Reactions    Clarithromycin Other (See Comments)     biaxin       Labs:  Hospital Outpatient Visit on 07/05/2022   Component Date Value    pH, Art i-STAT 07/05/2022 7  383     pCO2, Art i-STAT 07/05/2022 44 2 (A)    pO2, ART i-STAT 07/05/2022 78 0     BE, i-STAT 07/05/2022 1     HCO3, Art i-STAT 07/05/2022 26 3     CO2, i-STAT 07/05/2022 28     O2 Sat, i-STAT 07/05/2022 95 (A)    SODIUM, I-STAT 07/05/2022 140     Potassium, i-STAT 07/05/2022 4 5     Calcium, Ionized i-STAT 07/05/2022 1 26     Hct, i-STAT 07/05/2022 41     Hgb, i-STAT 07/05/2022 13 9     Glucose, i-STAT 07/05/2022 89     POC FIO2 07/05/2022 21     Specimen Type 07/05/2022 ARTERIAL     SITE 07/05/2022 Right Radial     SAMUEL TEST 07/05/2022 Positive Allens Test    Appointment on 05/23/2022   Component Date Value    Sodium 05/23/2022 141     Potassium 05/23/2022 4 5     Chloride 05/23/2022 104     CO2 05/23/2022 31     ANION GAP 05/23/2022 6     BUN 05/23/2022 30 (A)    Creatinine 05/23/2022 1 29     Glucose, Fasting 05/23/2022 82     Calcium 05/23/2022 9 0     AST 05/23/2022 24     ALT 05/23/2022 22     Alkaline Phosphatase 05/23/2022 70     Total Protein 05/23/2022 6 6     Albumin 05/23/2022 3 9     Total Bilirubin 05/23/2022 0 62     eGFR 05/23/2022 51     Cholesterol 05/23/2022 156     Triglycerides 05/23/2022 68     HDL, Direct 05/23/2022 40     LDL Calculated 05/23/2022 102 (A)    Non-HDL-Chol (CHOL-HDL) 05/23/2022 116     TSH 3RD GENERATON 05/23/2022 1 639     Free T4 05/23/2022 1 05     Hemoglobin A1C 05/23/2022 5 4     EAG 05/23/2022 108     WBC 05/23/2022 3 71 (A)    RBC 05/23/2022 4 61     Hemoglobin 05/23/2022 13 7     Hematocrit 05/23/2022 43 7     MCV 05/23/2022 95     MCH 05/23/2022 29 7     MCHC 05/23/2022 31 4     RDW 05/23/2022 12 8     Platelets 47/80/3545 172     MPV 05/23/2022 10 5     Vit D, 25-Hydroxy 05/23/2022 35 1     Flecainide 05/23/2022 <0 10 (A)     Imaging: Pulmonary exercise stress test (Cardiopulmonary exercise stress test)    Result Date: 7/7/2022  Narrative: Tian Adler 81 y o  male MRN: [id number] Data summary: The patient exercised via cycle ergometry in 15 w increments to exhaustion at 1:35 a m  w, which is 128% predicted  The maximal oxygen consumption (VO2 max) was 16 2 mL/kilogram per minute (1 7 liter/minute), which is 91% predicted  The respiratory exchange ratio increased appropriately with exercise  The heart rate increased from 53 beats per minute to 124 beats per minute, which is 89% predicted  The oxygen pulse augmented appropriately with exercise    The systolic blood pressure increased from 130 mm Hg to 224 mm Hg and diastolic blood pressure decreased from 70 mm Hg to 65 mm Hg  The electrocardiogram showed sinus rhythm with occasional PVCs during exercise  No ischemic changes at rest, during exercise or during recovery  However during recovery he developed trigeminy and eventually improved  The patient was asymptomatic  The minute ventilation increased from 7 6 liter/minute to 67 6 liter/minute, compared to an MVV of 73 L per minute, yielding a breathing reserve of 5 4 L  the respiratory rate increased from 9 breaths per minute to 46 breaths per minute and tidal volume increased from 843 mL to 1462 mL  The estimated dead space to tidal volume ratio decreased from 0 26-0  22  The O2 saturation was maintained between 91 and 94% throughout exercise  Arterial blood gas at rest showed respiratory acidosis with normal oxygenation  Arterial blood gas at peak exercise was not done  The anaerobic threshold was estimated at 48% of the predicted maximal oxygen consumption  Test ended by the patient due to shortness breath 7/10 on the Dennis scale  Study Interpretation: The patient has normal exercise tolerance as evident by achieving 125% of the predicted workload  He has normal maximal oxygen consumption and anaerobic threshold  He has no cardiovascular limitation  He had an exaggerated systolic blood pressure response to exercise  Even though the breathing reserve was abnormally low, I do not think this patient has pulmonary limitations  Clinical correlation is recommended  Review of Systems:  Review of Systems    Physical Exam:  124/60  Lungs clear  Rhythm regular markedly diminished left lung breath sounds  Frequent extrasystoles  Grade 2 short systolic ejection murmur at the base  Regular rhythm with extrasystole  No edema  Discussion/Summary:    1  History of dilated ascending aorta-stable  2  Coronary artery disease without angina pectoris  3   Effort related hypertension (patient said he had exercised all he could at that point)  4  Paralyzed left hemidiaphragm     Recommendations:    1  Somewhat exaggerated response to exercise but  no further therapy indicated   since resting blood pressure is normal     2  Return p r n        Jasmin Caldwell MD

## 2022-10-12 ENCOUNTER — APPOINTMENT (OUTPATIENT)
Dept: LAB | Facility: HOSPITAL | Age: 82
End: 2022-10-12
Attending: UROLOGY
Payer: COMMERCIAL

## 2022-10-12 DIAGNOSIS — N40.2 NODULAR PROSTATE WITHOUT URINARY OBSTRUCTION: ICD-10-CM

## 2022-10-12 PROCEDURE — 36415 COLL VENOUS BLD VENIPUNCTURE: CPT

## 2022-10-12 PROCEDURE — 84153 ASSAY OF PSA TOTAL: CPT

## 2022-10-12 PROCEDURE — 84154 ASSAY OF PSA FREE: CPT

## 2022-10-13 LAB
PSA FREE MFR SERPL: 28.9 %
PSA FREE SERPL-MCNC: 2.37 NG/ML
PSA SERPL-MCNC: 8.2 NG/ML (ref 0–4)

## 2022-12-28 ENCOUNTER — APPOINTMENT (OUTPATIENT)
Dept: LAB | Facility: HOSPITAL | Age: 82
End: 2022-12-28

## 2022-12-28 DIAGNOSIS — I10 HYPERTENSION, UNSPECIFIED TYPE: ICD-10-CM

## 2022-12-28 DIAGNOSIS — E55.9 VITAMIN D DEFICIENCY: ICD-10-CM

## 2022-12-28 LAB
25(OH)D3 SERPL-MCNC: 30.4 NG/ML (ref 30–100)
ALBUMIN SERPL BCP-MCNC: 4 G/DL (ref 3.5–5)
ALP SERPL-CCNC: 73 U/L (ref 34–104)
ALT SERPL W P-5'-P-CCNC: 16 U/L (ref 7–52)
ANION GAP SERPL CALCULATED.3IONS-SCNC: 6 MMOL/L (ref 4–13)
AST SERPL W P-5'-P-CCNC: 15 U/L (ref 13–39)
BILIRUB SERPL-MCNC: 0.63 MG/DL (ref 0.2–1)
BUN SERPL-MCNC: 29 MG/DL (ref 5–25)
CALCIUM SERPL-MCNC: 9.1 MG/DL (ref 8.4–10.2)
CHLORIDE SERPL-SCNC: 103 MMOL/L (ref 96–108)
CHOLEST SERPL-MCNC: 166 MG/DL
CO2 SERPL-SCNC: 30 MMOL/L (ref 21–32)
CREAT SERPL-MCNC: 1.49 MG/DL (ref 0.6–1.3)
ERYTHROCYTE [DISTWIDTH] IN BLOOD BY AUTOMATED COUNT: 12.6 % (ref 11.6–15.1)
EST. AVERAGE GLUCOSE BLD GHB EST-MCNC: 105 MG/DL
GFR SERPL CREATININE-BSD FRML MDRD: 43 ML/MIN/1.73SQ M
GLUCOSE P FAST SERPL-MCNC: 86 MG/DL (ref 65–99)
HBA1C MFR BLD: 5.3 %
HCT VFR BLD AUTO: 46.2 % (ref 36.5–49.3)
HDLC SERPL-MCNC: 50 MG/DL
HGB BLD-MCNC: 15.2 G/DL (ref 12–17)
LDLC SERPL CALC-MCNC: 97 MG/DL (ref 0–100)
MCH RBC QN AUTO: 31 PG (ref 26.8–34.3)
MCHC RBC AUTO-ENTMCNC: 32.9 G/DL (ref 31.4–37.4)
MCV RBC AUTO: 94 FL (ref 82–98)
NONHDLC SERPL-MCNC: 116 MG/DL
PLATELET # BLD AUTO: 191 THOUSANDS/UL (ref 149–390)
PMV BLD AUTO: 10.7 FL (ref 8.9–12.7)
POTASSIUM SERPL-SCNC: 4.5 MMOL/L (ref 3.5–5.3)
PROT SERPL-MCNC: 6.7 G/DL (ref 6.4–8.4)
RBC # BLD AUTO: 4.91 MILLION/UL (ref 3.88–5.62)
SODIUM SERPL-SCNC: 139 MMOL/L (ref 135–147)
T4 FREE SERPL-MCNC: 1.16 NG/DL (ref 0.76–1.46)
TRIGL SERPL-MCNC: 95 MG/DL
TSH SERPL DL<=0.05 MIU/L-ACNC: 2.49 UIU/ML (ref 0.45–4.5)
WBC # BLD AUTO: 4.59 THOUSAND/UL (ref 4.31–10.16)

## 2023-03-30 ENCOUNTER — OFFICE VISIT (OUTPATIENT)
Dept: CARDIOLOGY CLINIC | Facility: CLINIC | Age: 83
End: 2023-03-30

## 2023-03-30 VITALS
DIASTOLIC BLOOD PRESSURE: 74 MMHG | SYSTOLIC BLOOD PRESSURE: 132 MMHG | BODY MASS INDEX: 29.8 KG/M2 | WEIGHT: 220 LBS | HEIGHT: 72 IN | HEART RATE: 62 BPM

## 2023-03-30 DIAGNOSIS — R00.2 PALPITATIONS: Primary | ICD-10-CM

## 2023-03-30 DIAGNOSIS — I49.3 PVC (PREMATURE VENTRICULAR CONTRACTION): ICD-10-CM

## 2023-03-30 DIAGNOSIS — I49.1 ATRIAL ECTOPY: ICD-10-CM

## 2023-03-30 NOTE — PROGRESS NOTES
Patient ID: Santy Thornton is a 81 y o  male  Plan:      PVC (premature ventricular contraction)  Symptomatic-  In the past, flecainide and verapamil were ineffective  Heart rate is on the low side so would avoid beta-blocker  Presently none noted on EKG    Atrial ectopy  Likely the cause of his palpitations  Check BMP and TSH  We will hold off on medication for now, see additional notes below    Palpitations  Due to atrial ectopy       Follow up Plan/Summary Comments:  Paras Jose is noted to have a resting heart rate on the lower side  He previously did not tolerate multiple medications due to bradycardia and generalized unwell feelings  Reassurance was provided today that his symptoms are related to ectopy as noted on EKG  These are not harmful  Recommend monitoring off medication for now  If symptoms become intolerable, will need to consider medication, however given low heart rate may also need a pacemaker at that time  Check BMP and TSH to exclude any electrolyte or thyroid abnormalities as a cause  Follow-up in 3 months for reevaluation  He will notify us sooner if symptoms become worse  HPI: Paras Jose is seen in the office today for evaluation of palpitations  Paras Jose has been evaluated in our office in the past for symptomatic ventricular ectopy  He was trialed on verapamil but did not tolerate this medication  Flecainide was ineffective  Since that time, he was evaluated in another cardiology office  He returns today for evaluation of palpitations and a fluttering sensation in his chest that he noticed while walking yesterday  He continues to have intermittent episodes of palpitations without other symptoms  He denies any chest pain or pressure, shortness of breath, dizziness, lightheadedness, or syncope  He is concerned about the frequency and return of his symptoms      Review of Systems   10  point ROS  was otherwise non pertinent or negative except as per HPI or as below    Gait: Normal       Most recent or relevant cardiac/vascular testing:    Echo 12/13/2021  EF 55%, possible small prior inferobasal MI  Mildly dilated aortic root  Mild LAE    Nuclear stress test 12/29/2020  No significant ischemia or infarct    Results for orders placed or performed in visit on 03/30/23   POCT ECG    Impression    SR rosalesq PAC's           Objective:     /74   Pulse 62   Ht 6' (1 829 m)   Wt 99 8 kg (220 lb)   BMI 29 84 kg/m²     PHYSICAL EXAM:    General:  Normal appearance, no acute distress  Eyes:  Anicteric  Oral mucosa:  Moist   Neck:  No JVD  Carotid upstrokes are brisk without bruits  No masses  Chest:  Clear to auscultation, decreased L base (paralyzed hemidiaphragm)  Cardiac:  No palpable PMI  Normal S1 and S2  No murmur gallop or rub  Abdomen:  Soft and nontender  No palpable organomegaly or aortic enlargement  Extremities:  Trace bilat LE edema  Musculoskeletal:  Symmetric  Vascular: Pedal pulses are intact  Neuro:  Grossly symmetric  Psych:  Alert and oriented x3      Allergies   Allergen Reactions   • Clarithromycin Other (See Comments)     biaxin       Current Outpatient Medications:   •  aspirin (ECOTRIN LOW STRENGTH) 81 mg EC tablet, Take 1 tablet by mouth daily, Disp: , Rfl:   •  Cholecalciferol 50 MCG (2000 UT) CAPS, Take 1 capsule by mouth daily Pt reported taking every other day, Disp: , Rfl:   •  famotidine (PEPCID) 20 mg tablet, Take 20 mg by mouth 2 (two) times a day, Disp: , Rfl:   •  levothyroxine 75 mcg tablet, Take 1 tablet by mouth daily, Disp: , Rfl:   •  tamsulosin (FLOMAX) 0 4 mg, tamsulosin 0 4 mg capsule, Disp: , Rfl:   Past Medical History:   Diagnosis Date   • BPH (benign prostatic hyperplasia)    • Disease of thyroid gland    • GERD (gastroesophageal reflux disease)    • PVC's (premature ventricular contractions)      Past Surgical History:   Procedure Laterality Date   • EYE SURGERY     • HERNIA REPAIR     • ROTATOR CUFF REPAIR CMP:   Lab Results   Component Value Date     2013    K 4 5 2022    K 4 1 2013     2022     2013    CO2 30 2022    CO2 28 2022    BUN 29 (H) 2022    BUN 23 2013    CREATININE 1 49 (H) 2022    CREATININE 1 11 2013    GLUCOSE 89 2022    GLUCOSE 92 2013    EGFR 43 2022     Lipid Profile:    Lab Results   Component Value Date    TRIG 95 2022    HDL 50 2022         Social History     Tobacco Use   Smoking Status Former   • Packs/day: 0 75   • Years: 14 00   • Pack years: 10 50   • Types: Cigarettes   • Start date:    • Quit date: 5   • Years since quittin 2   Smokeless Tobacco Never

## 2023-03-30 NOTE — ASSESSMENT & PLAN NOTE
Likely the cause of his palpitations  Check BMP and TSH  We will hold off on medication for now, see additional notes below

## 2023-03-30 NOTE — ASSESSMENT & PLAN NOTE
Symptomatic-  In the past, flecainide and verapamil were ineffective  Heart rate is on the low side so would avoid beta-blocker    Presently none noted on EKG

## 2023-06-05 ENCOUNTER — OFFICE VISIT (OUTPATIENT)
Dept: CARDIOLOGY CLINIC | Facility: CLINIC | Age: 83
End: 2023-06-05
Payer: COMMERCIAL

## 2023-06-05 VITALS
BODY MASS INDEX: 29.39 KG/M2 | SYSTOLIC BLOOD PRESSURE: 122 MMHG | HEIGHT: 72 IN | HEART RATE: 48 BPM | DIASTOLIC BLOOD PRESSURE: 68 MMHG | WEIGHT: 217 LBS

## 2023-06-05 DIAGNOSIS — I71.21 ANEURYSM OF ASCENDING AORTA WITHOUT RUPTURE (HCC): ICD-10-CM

## 2023-06-05 DIAGNOSIS — I25.119 CORONARY ARTERY DISEASE INVOLVING NATIVE CORONARY ARTERY OF NATIVE HEART WITH ANGINA PECTORIS (HCC): ICD-10-CM

## 2023-06-05 DIAGNOSIS — R07.89 OTHER CHEST PAIN: Primary | ICD-10-CM

## 2023-06-05 DIAGNOSIS — R00.1 BRADYCARDIA: ICD-10-CM

## 2023-06-05 DIAGNOSIS — I49.3 PVC (PREMATURE VENTRICULAR CONTRACTION): ICD-10-CM

## 2023-06-05 PROCEDURE — 93000 ELECTROCARDIOGRAM COMPLETE: CPT | Performed by: INTERNAL MEDICINE

## 2023-06-05 PROCEDURE — 99214 OFFICE O/P EST MOD 30 MIN: CPT | Performed by: INTERNAL MEDICINE

## 2023-06-05 NOTE — ASSESSMENT & PLAN NOTE
Sounds like he had angina a few weeks ago but no recurrence  Cannot tell for sure  Claire George will be ordered

## 2023-06-05 NOTE — PROGRESS NOTES
Patient ID: Eula Cool is a 81 y o  male  Plan:      Bradycardia  Longstanding but no symptoms  Thoracic aortic aneurysm (HCC)  Last measurement several years ago was 4 3 cm  Will reassess by echo  Coronary artery disease involving native coronary artery of native heart with angina pectoris ([address] )  Sounds like he had angina a few weeks ago but no recurrence  Cannot tell for sure  Jennifer Rapp will be ordered  Follow up Plan/Other summary comments:  Return in about 1 year (around 6/5/2024)  HPI: Patient is seen in follow-up today regarding the above  Since several weeks ago while at a flea market he had 5 minutes of chest discomfort  This resolved and has not recurred  He continues to be reasonably active including walking a mile but does all of this slowly  Results for orders placed or performed in visit on 06/05/23   POCT ECG    Impression    Sinus bradycardia at 48  Otherwise WNL  Most recent or relevant cardiac/vascular testing:    TTE 12/18/2020: Normal LV systolic function  4 3 cm ascending aorta  Myoview 12/29/2020: Runs of ventricular accelerated rhythm but otherwise normal     Past Surgical History:   Procedure Laterality Date   • EYE SURGERY     • HERNIA REPAIR     • ROTATOR CUFF REPAIR         Lipid Profile:   Lab Results   Component Value Date    HDL 50 12/28/2022    TRIG 95 12/28/2022         Review of Systems   10  point ROS  was otherwise non pertinent or negative except as per HPI or as below  Gait:  Normal         Objective:     /68   Pulse (!) 48   Ht 6' (1 829 m)   Wt 98 4 kg (217 lb)   BMI 29 43 kg/m²     PHYSICAL EXAM:    General:  Normal appearance in no distress  Eyes:  Anicteric  Oral mucosa:  Moist   Neck:  No JVD  Carotid upstrokes are brisk without bruits  No masses  Chest:  Clear to auscultation  Cardiac:  No palpable PMI  Normal S1 and S2  No murmur gallop or rub  Abdomen:  Soft and nontender   No palpable organomegaly or aortic enlargement  Extremities:  No peripheral edema  Musculoskeletal:  Symmetric  Vascular:  Femoral pulses are brisk without bruits  Popliteal pulses are intact bilaterally  Pedal pulses are intact  Neuro:  Grossly symmetric  Psych:  Alert and oriented x3          Current Outpatient Medications:   •  aspirin (ECOTRIN LOW STRENGTH) 81 mg EC tablet, Take 1 tablet by mouth daily, Disp: , Rfl:   •  Cholecalciferol 50 MCG ( UT) CAPS, Take 1 capsule by mouth daily Pt reported taking every other day, Disp: , Rfl:   •  famotidine (PEPCID) 20 mg tablet, Take 20 mg by mouth 2 (two) times a day, Disp: , Rfl:   •  levothyroxine 75 mcg tablet, Take 1 tablet by mouth daily, Disp: , Rfl:   •  tamsulosin (FLOMAX) 0 4 mg, Take 0 8 mg by mouth daily with dinner, Disp: , Rfl:   Allergies   Allergen Reactions   • Clarithromycin Other (See Comments)     biaxin     Past Medical History:   Diagnosis Date   • BPH (benign prostatic hyperplasia)    • Disease of thyroid gland    • GERD (gastroesophageal reflux disease)    • PVC's (premature ventricular contractions)            Social History     Tobacco Use   Smoking Status Former   • Packs/day: 0 75   • Years: 14 00   • Total pack years: 10 50   • Types: Cigarettes   • Start date: 27   • Quit date: 5   • Years since quittin 4   Smokeless Tobacco Never

## 2023-06-22 ENCOUNTER — HOSPITAL ENCOUNTER (OUTPATIENT)
Dept: NON INVASIVE DIAGNOSTICS | Facility: HOSPITAL | Age: 83
Discharge: HOME | End: 2023-06-22
Attending: INTERNAL MEDICINE
Payer: COMMERCIAL

## 2023-06-22 ENCOUNTER — HOSPITAL ENCOUNTER (OUTPATIENT)
Dept: NUCLEAR MEDICINE | Facility: HOSPITAL | Age: 83
End: 2023-06-22
Attending: INTERNAL MEDICINE
Payer: COMMERCIAL

## 2023-06-22 VITALS
WEIGHT: 217 LBS | DIASTOLIC BLOOD PRESSURE: 70 MMHG | HEART RATE: 50 BPM | SYSTOLIC BLOOD PRESSURE: 126 MMHG | HEIGHT: 72 IN | BODY MASS INDEX: 29.39 KG/M2

## 2023-06-22 VITALS
HEART RATE: 50 BPM | DIASTOLIC BLOOD PRESSURE: 70 MMHG | RESPIRATION RATE: 16 BRPM | OXYGEN SATURATION: 99 % | HEIGHT: 72 IN | WEIGHT: 217 LBS | BODY MASS INDEX: 29.39 KG/M2 | SYSTOLIC BLOOD PRESSURE: 126 MMHG

## 2023-06-22 DIAGNOSIS — I25.119 CORONARY ARTERY DISEASE INVOLVING NATIVE CORONARY ARTERY OF NATIVE HEART WITH ANGINA PECTORIS (HCC): ICD-10-CM

## 2023-06-22 DIAGNOSIS — I49.3 PVC (PREMATURE VENTRICULAR CONTRACTION): ICD-10-CM

## 2023-06-22 DIAGNOSIS — I71.21 ANEURYSM OF ASCENDING AORTA WITHOUT RUPTURE (HCC): ICD-10-CM

## 2023-06-22 LAB
AORTIC ROOT: 4.2 CM
AORTIC VALVE MEAN VELOCITY: 13 M/S
APICAL FOUR CHAMBER EJECTION FRACTION: 66 %
ASCENDING AORTA: 3.4 CM
AV LVOT MEAN GRADIENT: 2 MMHG
AV LVOT PEAK GRADIENT: 3 MMHG
AV MEAN GRADIENT: 8 MMHG
AV PEAK GRADIENT: 16 MMHG
CHEST PAIN STATEMENT: NORMAL
DOP CALC AO PEAK VEL: 1.98 M/S
DOP CALC AO VTI: 50.13 CM
DOP CALC LVOT PEAK VEL VTI: 22.79 CM
DOP CALC LVOT PEAK VEL: 0.9 M/S
E WAVE DECELERATION TIME: 346 MS
E/A RATIO: 0.98
FRACTIONAL SHORTENING: 27 (ref 28–44)
INTERVENTRICULAR SEPTUM IN DIASTOLE (PARASTERNAL SHORT AXIS VIEW): 1.3 CM
INTERVENTRICULAR SEPTUM: 1.3 CM (ref 0.6–1.1)
LAAS-AP2: 27.5 CM2
LAAS-AP4: 32.9 CM2
LEFT ATRIUM SIZE: 4.2 CM
LEFT ATRIUM VOLUME INDEX (MOD BIPLANE): 50.2
LEFT INTERNAL DIMENSION IN SYSTOLE: 2.7 CM (ref 2.1–4)
LEFT VENTRICULAR INTERNAL DIMENSION IN DIASTOLE: 3.7 CM (ref 3.5–6)
LEFT VENTRICULAR POSTERIOR WALL IN END DIASTOLE: 1 CM
LEFT VENTRICULAR STROKE VOLUME: 34 ML
LVSV (TEICH): 34 ML
MAX DIASTOLIC BP: 80 MMHG
MAX HEART RATE: 87 BPM
MAX PREDICTED HEART RATE: 138 BPM
MAX. SYSTOLIC BP: 136 MMHG
MV E'TISSUE VEL-SEP: 9 CM/S
MV PEAK A VEL: 1.09 M/S
MV PEAK E VEL: 107 CM/S
MV STENOSIS PRESSURE HALF TIME: 100 MS
MV VALVE AREA P 1/2 METHOD: 2.2
NUC STRESS EJECTION FRACTION: 55 %
PROTOCOL NAME: NORMAL
RATE PRESSURE PRODUCT: 9322
REASON FOR TERMINATION: NORMAL
RIGHT VENTRICLE ID DIMENSION: 4.2 CM
SL CV LEFT ATRIUM LENGTH A2C: 7.2 CM
SL CV LV EF: 60
SL CV PED ECHO LEFT VENTRICLE DIASTOLIC VOLUME (MOD BIPLANE) 2D: 60 ML
SL CV PED ECHO LEFT VENTRICLE SYSTOLIC VOLUME (MOD BIPLANE) 2D: 26 ML
SL CV REST NUCLEAR ISOTOPE DOSE: 10.9 MCI
SL CV STRESS NUCLEAR ISOTOPE DOSE: 32.2 MCI
SL CV STRESS RECOVERY BP: NORMAL MMHG
SL CV STRESS RECOVERY HR: 60 BPM
SL CV STRESS RECOVERY O2 SAT: 97 %
STRESS ANGINA INDEX: 0
STRESS BASELINE BP: NORMAL MMHG
STRESS BASELINE HR: 50 BPM
STRESS O2 SAT REST: 99 %
STRESS PEAK HR: 79 BPM
STRESS POST O2 SAT PEAK: 95 %
STRESS POST PEAK BP: 118 MMHG
TARGET HR FORMULA: NORMAL
TEST INDICATION: NORMAL
TIME IN EXERCISE PHASE: NORMAL
TR MAX PG: 33 MMHG
TR PEAK VELOCITY: 2.9 M/S
TRICUSPID ANNULAR PLANE SYSTOLIC EXCURSION: 3.5 CM
TRICUSPID VALVE PEAK REGURGITATION VELOCITY: 2.86 M/S

## 2023-06-22 PROCEDURE — 93016 CV STRESS TEST SUPVJ ONLY: CPT | Performed by: INTERNAL MEDICINE

## 2023-06-22 PROCEDURE — 78452 HT MUSCLE IMAGE SPECT MULT: CPT | Performed by: INTERNAL MEDICINE

## 2023-06-22 PROCEDURE — G1004 CDSM NDSC: HCPCS

## 2023-06-22 PROCEDURE — 93306 TTE W/DOPPLER COMPLETE: CPT

## 2023-06-22 PROCEDURE — 93018 CV STRESS TEST I&R ONLY: CPT | Performed by: INTERNAL MEDICINE

## 2023-06-22 PROCEDURE — 93017 CV STRESS TEST TRACING ONLY: CPT

## 2023-06-22 PROCEDURE — 78452 HT MUSCLE IMAGE SPECT MULT: CPT

## 2023-06-22 PROCEDURE — A9502 TC99M TETROFOSMIN: HCPCS

## 2023-06-22 PROCEDURE — 93306 TTE W/DOPPLER COMPLETE: CPT | Performed by: INTERNAL MEDICINE

## 2023-06-22 RX ORDER — REGADENOSON 0.08 MG/ML
0.4 INJECTION, SOLUTION INTRAVENOUS ONCE
Status: COMPLETED | OUTPATIENT
Start: 2023-06-22 | End: 2023-06-22

## 2023-06-22 RX ADMIN — REGADENOSON 0.4 MG: 0.08 INJECTION, SOLUTION INTRAVENOUS at 08:39

## 2023-06-28 ENCOUNTER — APPOINTMENT (OUTPATIENT)
Dept: LAB | Facility: HOSPITAL | Age: 83
End: 2023-06-28
Payer: COMMERCIAL

## 2023-06-28 DIAGNOSIS — E55.9 VITAMIN D DEFICIENCY: ICD-10-CM

## 2023-06-28 DIAGNOSIS — Z79.899 ENCOUNTER FOR LONG-TERM (CURRENT) USE OF OTHER MEDICATIONS: ICD-10-CM

## 2023-06-28 DIAGNOSIS — I10 HYPERTENSION, UNSPECIFIED TYPE: ICD-10-CM

## 2023-06-28 DIAGNOSIS — E78.5 HYPERLIPIDEMIA, UNSPECIFIED HYPERLIPIDEMIA TYPE: ICD-10-CM

## 2023-06-28 LAB
25(OH)D3 SERPL-MCNC: 29.9 NG/ML (ref 30–100)
ALBUMIN SERPL BCP-MCNC: 4.1 G/DL (ref 3.5–5)
ALP SERPL-CCNC: 75 U/L (ref 34–104)
ALT SERPL W P-5'-P-CCNC: 16 U/L (ref 7–52)
ANION GAP SERPL CALCULATED.3IONS-SCNC: 6 MMOL/L
AST SERPL W P-5'-P-CCNC: 18 U/L (ref 13–39)
BILIRUB SERPL-MCNC: 0.62 MG/DL (ref 0.2–1)
BNP SERPL-MCNC: 76 PG/ML (ref 0–100)
BUN SERPL-MCNC: 33 MG/DL (ref 5–25)
CALCIUM SERPL-MCNC: 9.3 MG/DL (ref 8.4–10.2)
CHLORIDE SERPL-SCNC: 104 MMOL/L (ref 96–108)
CHOLEST SERPL-MCNC: 156 MG/DL
CO2 SERPL-SCNC: 30 MMOL/L (ref 21–32)
CREAT SERPL-MCNC: 1.41 MG/DL (ref 0.6–1.3)
ERYTHROCYTE [DISTWIDTH] IN BLOOD BY AUTOMATED COUNT: 12.5 % (ref 11.6–15.1)
EST. AVERAGE GLUCOSE BLD GHB EST-MCNC: 108 MG/DL
GFR SERPL CREATININE-BSD FRML MDRD: 46 ML/MIN/1.73SQ M
GLUCOSE P FAST SERPL-MCNC: 92 MG/DL (ref 65–99)
HBA1C MFR BLD: 5.4 %
HCT VFR BLD AUTO: 45.6 % (ref 36.5–49.3)
HDLC SERPL-MCNC: 42 MG/DL
HGB BLD-MCNC: 14.3 G/DL (ref 12–17)
LDLC SERPL CALC-MCNC: 100 MG/DL (ref 0–100)
MCH RBC QN AUTO: 30.1 PG (ref 26.8–34.3)
MCHC RBC AUTO-ENTMCNC: 31.4 G/DL (ref 31.4–37.4)
MCV RBC AUTO: 96 FL (ref 82–98)
NONHDLC SERPL-MCNC: 114 MG/DL
PLATELET # BLD AUTO: 159 THOUSANDS/UL (ref 149–390)
PMV BLD AUTO: 10.3 FL (ref 8.9–12.7)
POTASSIUM SERPL-SCNC: 4.9 MMOL/L (ref 3.5–5.3)
PROT SERPL-MCNC: 6.9 G/DL (ref 6.4–8.4)
RBC # BLD AUTO: 4.75 MILLION/UL (ref 3.88–5.62)
SODIUM SERPL-SCNC: 140 MMOL/L (ref 135–147)
T4 FREE SERPL-MCNC: 1.27 NG/DL (ref 0.61–1.12)
TRIGL SERPL-MCNC: 72 MG/DL
TSH SERPL DL<=0.05 MIU/L-ACNC: 2.62 UIU/ML (ref 0.45–4.5)
WBC # BLD AUTO: 3.9 THOUSAND/UL (ref 4.31–10.16)

## 2023-06-28 PROCEDURE — 84439 ASSAY OF FREE THYROXINE: CPT

## 2023-06-28 PROCEDURE — 85027 COMPLETE CBC AUTOMATED: CPT

## 2023-06-28 PROCEDURE — 82306 VITAMIN D 25 HYDROXY: CPT

## 2023-06-28 PROCEDURE — 83880 ASSAY OF NATRIURETIC PEPTIDE: CPT

## 2023-06-28 PROCEDURE — 83036 HEMOGLOBIN GLYCOSYLATED A1C: CPT

## 2023-06-28 PROCEDURE — 80053 COMPREHEN METABOLIC PANEL: CPT

## 2023-06-28 PROCEDURE — 36415 COLL VENOUS BLD VENIPUNCTURE: CPT

## 2023-06-28 PROCEDURE — 84443 ASSAY THYROID STIM HORMONE: CPT

## 2023-06-28 PROCEDURE — 80061 LIPID PANEL: CPT

## 2023-10-26 ENCOUNTER — APPOINTMENT (OUTPATIENT)
Dept: LAB | Facility: HOSPITAL | Age: 83
End: 2023-10-26
Payer: COMMERCIAL

## 2023-10-26 DIAGNOSIS — R97.20 ELEVATED PROSTATE SPECIFIC ANTIGEN (PSA): ICD-10-CM

## 2023-10-26 LAB — PSA SERPL-MCNC: 11.62 NG/ML (ref 0–4)

## 2023-10-26 PROCEDURE — 36415 COLL VENOUS BLD VENIPUNCTURE: CPT

## 2023-10-26 PROCEDURE — G0103 PSA SCREENING: HCPCS

## 2023-10-31 ENCOUNTER — HOSPITAL ENCOUNTER (EMERGENCY)
Facility: HOSPITAL | Age: 83
Discharge: HOME | End: 2023-10-31
Attending: FAMILY MEDICINE
Payer: COMMERCIAL

## 2023-10-31 ENCOUNTER — APPOINTMENT (EMERGENCY)
Dept: RADIOLOGY | Facility: HOSPITAL | Age: 83
End: 2023-10-31
Payer: COMMERCIAL

## 2023-10-31 VITALS
BODY MASS INDEX: 28.07 KG/M2 | TEMPERATURE: 97.3 F | WEIGHT: 207 LBS | OXYGEN SATURATION: 96 % | HEART RATE: 65 BPM | SYSTOLIC BLOOD PRESSURE: 121 MMHG | RESPIRATION RATE: 20 BRPM | DIASTOLIC BLOOD PRESSURE: 68 MMHG

## 2023-10-31 DIAGNOSIS — K20.80 PILL ESOPHAGITIS: ICD-10-CM

## 2023-10-31 DIAGNOSIS — R00.2 PALPITATIONS: Primary | ICD-10-CM

## 2023-10-31 DIAGNOSIS — T50.905A PILL ESOPHAGITIS: ICD-10-CM

## 2023-10-31 LAB
2HR DELTA HS TROPONIN: 4 NG/L
ANION GAP SERPL CALCULATED.3IONS-SCNC: 7 MMOL/L
ATRIAL RATE: 57 BPM
ATRIAL RATE: 65 BPM
ATRIAL RATE: 80 BPM
BASOPHILS # BLD AUTO: 0.02 THOUSANDS/ÂΜL (ref 0–0.1)
BASOPHILS NFR BLD AUTO: 0 % (ref 0–1)
BNP SERPL-MCNC: 150 PG/ML (ref 0–100)
BUN SERPL-MCNC: 29 MG/DL (ref 5–25)
CALCIUM SERPL-MCNC: 9.1 MG/DL (ref 8.4–10.2)
CARDIAC TROPONIN I PNL SERPL HS: 16 NG/L
CARDIAC TROPONIN I PNL SERPL HS: 20 NG/L
CHLORIDE SERPL-SCNC: 104 MMOL/L (ref 96–108)
CO2 SERPL-SCNC: 28 MMOL/L (ref 21–32)
CREAT SERPL-MCNC: 1.5 MG/DL (ref 0.6–1.3)
EOSINOPHIL # BLD AUTO: 0.12 THOUSAND/ÂΜL (ref 0–0.61)
EOSINOPHIL NFR BLD AUTO: 2 % (ref 0–6)
ERYTHROCYTE [DISTWIDTH] IN BLOOD BY AUTOMATED COUNT: 12.8 % (ref 11.6–15.1)
GFR SERPL CREATININE-BSD FRML MDRD: 42 ML/MIN/1.73SQ M
GLUCOSE SERPL-MCNC: 155 MG/DL (ref 65–140)
HCT VFR BLD AUTO: 47.4 % (ref 36.5–49.3)
HGB BLD-MCNC: 15.2 G/DL (ref 12–17)
IMM GRANULOCYTES # BLD AUTO: 0.01 THOUSAND/UL (ref 0–0.2)
IMM GRANULOCYTES NFR BLD AUTO: 0 % (ref 0–2)
INR PPP: 0.93 (ref 0.84–1.19)
LYMPHOCYTES # BLD AUTO: 1.34 THOUSANDS/ÂΜL (ref 0.6–4.47)
LYMPHOCYTES NFR BLD AUTO: 26 % (ref 14–44)
MAGNESIUM SERPL-MCNC: 1.8 MG/DL (ref 1.9–2.7)
MCH RBC QN AUTO: 30.6 PG (ref 26.8–34.3)
MCHC RBC AUTO-ENTMCNC: 32.1 G/DL (ref 31.4–37.4)
MCV RBC AUTO: 96 FL (ref 82–98)
MONOCYTES # BLD AUTO: 0.25 THOUSAND/ÂΜL (ref 0.17–1.22)
MONOCYTES NFR BLD AUTO: 5 % (ref 4–12)
NEUTROPHILS # BLD AUTO: 3.49 THOUSANDS/ÂΜL (ref 1.85–7.62)
NEUTS SEG NFR BLD AUTO: 67 % (ref 43–75)
NRBC BLD AUTO-RTO: 0 /100 WBCS
P AXIS: 70 DEGREES
PLATELET # BLD AUTO: 188 THOUSANDS/UL (ref 149–390)
PMV BLD AUTO: 10.2 FL (ref 8.9–12.7)
POTASSIUM SERPL-SCNC: 4.2 MMOL/L (ref 3.5–5.3)
PR INTERVAL: 180 MS
PR INTERVAL: 184 MS
PROTHROMBIN TIME: 12.7 SECONDS (ref 11.6–14.5)
QRS AXIS: 73 DEGREES
QRS AXIS: 74 DEGREES
QRS AXIS: 78 DEGREES
QRSD INTERVAL: 100 MS
QRSD INTERVAL: 102 MS
QRSD INTERVAL: 98 MS
QT INTERVAL: 300 MS
QT INTERVAL: 386 MS
QT INTERVAL: 408 MS
QTC INTERVAL: 424 MS
QTC INTERVAL: 445 MS
QTC INTERVAL: 461 MS
RBC # BLD AUTO: 4.96 MILLION/UL (ref 3.88–5.62)
SODIUM SERPL-SCNC: 139 MMOL/L (ref 135–147)
T WAVE AXIS: 17 DEGREES
T WAVE AXIS: 50 DEGREES
T WAVE AXIS: 62 DEGREES
TSH SERPL DL<=0.05 MIU/L-ACNC: 3.29 UIU/ML (ref 0.45–4.5)
VENTRICULAR RATE: 142 BPM
VENTRICULAR RATE: 65 BPM
VENTRICULAR RATE: 80 BPM
WBC # BLD AUTO: 5.23 THOUSAND/UL (ref 4.31–10.16)

## 2023-10-31 PROCEDURE — 99285 EMERGENCY DEPT VISIT HI MDM: CPT

## 2023-10-31 PROCEDURE — 84484 ASSAY OF TROPONIN QUANT: CPT | Performed by: FAMILY MEDICINE

## 2023-10-31 PROCEDURE — 36415 COLL VENOUS BLD VENIPUNCTURE: CPT | Performed by: FAMILY MEDICINE

## 2023-10-31 PROCEDURE — 93010 ELECTROCARDIOGRAM REPORT: CPT | Performed by: INTERNAL MEDICINE

## 2023-10-31 PROCEDURE — 71045 X-RAY EXAM CHEST 1 VIEW: CPT

## 2023-10-31 PROCEDURE — 80048 BASIC METABOLIC PNL TOTAL CA: CPT | Performed by: FAMILY MEDICINE

## 2023-10-31 PROCEDURE — 96365 THER/PROPH/DIAG IV INF INIT: CPT

## 2023-10-31 PROCEDURE — 99285 EMERGENCY DEPT VISIT HI MDM: CPT | Performed by: FAMILY MEDICINE

## 2023-10-31 PROCEDURE — 84443 ASSAY THYROID STIM HORMONE: CPT | Performed by: FAMILY MEDICINE

## 2023-10-31 PROCEDURE — 85025 COMPLETE CBC W/AUTO DIFF WBC: CPT | Performed by: FAMILY MEDICINE

## 2023-10-31 PROCEDURE — 83880 ASSAY OF NATRIURETIC PEPTIDE: CPT | Performed by: FAMILY MEDICINE

## 2023-10-31 PROCEDURE — 93005 ELECTROCARDIOGRAM TRACING: CPT

## 2023-10-31 PROCEDURE — 83735 ASSAY OF MAGNESIUM: CPT | Performed by: FAMILY MEDICINE

## 2023-10-31 PROCEDURE — 85610 PROTHROMBIN TIME: CPT | Performed by: FAMILY MEDICINE

## 2023-10-31 RX ORDER — MAGNESIUM SULFATE HEPTAHYDRATE 40 MG/ML
2 INJECTION, SOLUTION INTRAVENOUS ONCE
Status: COMPLETED | OUTPATIENT
Start: 2023-10-31 | End: 2023-10-31

## 2023-10-31 RX ORDER — DILTIAZEM HYDROCHLORIDE 5 MG/ML
20 INJECTION INTRAVENOUS ONCE
Status: DISCONTINUED | OUTPATIENT
Start: 2023-10-31 | End: 2023-10-31 | Stop reason: HOSPADM

## 2023-10-31 RX ADMIN — MAGNESIUM SULFATE HEPTAHYDRATE 2 G: 40 INJECTION, SOLUTION INTRAVENOUS at 08:10

## 2023-10-31 NOTE — ED PROVIDER NOTES
History  Chief Complaint   Patient presents with    Rapid Heart Rate     Normal is 50, feels like a pill is stuck in his throat. All started taking pills this morning. Hx. Irregular heart rate, dizziness for a couple of days       HPI  This is a 80-year-old male with a history of atrial ectopy presented with complaint of palpitation and something stuck in his throat. Patient states that he took his pill last night and feels that it still stuck in his throat. He states that he drank water juice and milk but still feels that something is stuck. Denies any difficulty breathing denies any difficulty swallowing. Patient also states that that he checked his heart rate which was elevated. Patient denies any chest pain shortness of breath patient is also complaining of feeling dizzy for past 2 days. Sitting comfortably in bed answer questions. Prior to Admission Medications   Prescriptions Last Dose Informant Patient Reported? Taking? Cholecalciferol 50 MCG (2000 UT) CAPS 10/31/2023 Self Yes Yes   Sig: Take 1 capsule by mouth daily Pt reported taking every other day   aspirin (ECOTRIN LOW STRENGTH) 81 mg EC tablet 10/31/2023 Self Yes Yes   Sig: Take 1 tablet by mouth daily   famotidine (PEPCID) 20 mg tablet 10/31/2023 Self Yes Yes   Sig: Take 20 mg by mouth 2 (two) times a day   levothyroxine 75 mcg tablet 10/31/2023 Self Yes Yes   Sig: Take 1 tablet by mouth daily   tamsulosin (FLOMAX) 0.4 mg 10/31/2023 Self Yes Yes   Sig: Take 0.8 mg by mouth daily with dinner      Facility-Administered Medications: None       Past Medical History:   Diagnosis Date    BPH (benign prostatic hyperplasia)     Disease of thyroid gland     GERD (gastroesophageal reflux disease)     PVC's (premature ventricular contractions)        Past Surgical History:   Procedure Laterality Date    EYE SURGERY      HERNIA REPAIR      ROTATOR CUFF REPAIR         History reviewed. No pertinent family history.   I have reviewed and agree with the history as documented. E-Cigarette/Vaping    E-Cigarette Use Never User      E-Cigarette/Vaping Substances    Nicotine No     THC No     CBD No     Flavoring No     Other No     Unknown No      Social History     Tobacco Use    Smoking status: Former     Packs/day: 0.75     Years: 14.00     Total pack years: 10.50     Types: Cigarettes     Start date:      Quit date:      Years since quittin.8    Smokeless tobacco: Never   Vaping Use    Vaping Use: Never used   Substance Use Topics    Alcohol use: Yes    Drug use: No       Review of Systems   Constitutional: Negative. HENT: Negative. Eyes: Negative. Respiratory: Negative. Cardiovascular:  Positive for palpitations. Gastrointestinal: Negative. Endocrine: Negative. Genitourinary: Negative. Musculoskeletal: Negative. Allergic/Immunologic: Negative. Neurological:  Positive for dizziness. Hematological: Negative. Psychiatric/Behavioral: Negative. Physical Exam  Physical Exam  Vitals and nursing note reviewed. Constitutional:       General: He is not in acute distress. Appearance: He is well-developed. He is not diaphoretic. HENT:      Head: Normocephalic and atraumatic. Right Ear: External ear normal.      Left Ear: External ear normal.      Nose: Nose normal.   Eyes:      Conjunctiva/sclera: Conjunctivae normal.      Pupils: Pupils are equal, round, and reactive to light. Cardiovascular:      Rate and Rhythm: Regular rhythm. Tachycardia present. Pulmonary:      Effort: Pulmonary effort is normal. No respiratory distress. Breath sounds: Normal breath sounds. No wheezing. Abdominal:      General: There is no distension. Tenderness: There is no abdominal tenderness. Musculoskeletal:         General: Normal range of motion. Cervical back: Normal range of motion and neck supple. Lymphadenopathy:      Cervical: No cervical adenopathy. Skin:     General: Skin is warm and dry. Capillary Refill: Capillary refill takes less than 2 seconds. Neurological:      General: No focal deficit present. Mental Status: He is alert and oriented to person, place, and time. Psychiatric:         Behavior: Behavior normal.         Vital Signs  ED Triage Vitals [10/31/23 0714]   Temperature Pulse Respirations Blood Pressure SpO2   (!) 97.3 °F (36.3 °C) (!) 132 20 103/68 95 %      Temp Source Heart Rate Source Patient Position - Orthostatic VS BP Location FiO2 (%)   Tympanic Monitor Sitting Left arm --      Pain Score       No Pain           Vitals:    10/31/23 0800 10/31/23 0830 10/31/23 0900 10/31/23 0930   BP: 120/62 121/59 115/70 121/68   Pulse: 81 72 67 65   Patient Position - Orthostatic VS: Sitting Sitting Sitting Sitting         Visual Acuity  Visual Acuity      Flowsheet Row Most Recent Value   L Pupil Size (mm) 3   R Pupil Size (mm) 3            ED Medications  Medications   diltiazem (CARDIZEM) injection 20 mg (0 mg Intravenous Hold 10/31/23 0732)   sodium chloride 0.9 % bolus 1,000 mL (0 mL Intravenous Hold 10/31/23 0733)   magnesium sulfate 2 g/50 mL IVPB (premix) 2 g (2 g Intravenous New Bag 10/31/23 0810)   glucagon (GLUCAGEN) injection 1 mg (1 mg Intravenous Not Given 10/31/23 0900)       Diagnostic Studies  Results Reviewed       Procedure Component Value Units Date/Time    HS Troponin I 2hr [[id number]] Collected: 10/31/23 0923    Lab Status:  In process Specimen: Blood from Arm, Right Updated: 10/31/23 0926    HS Troponin I 4hr [[id number]]     Lab Status: No result Specimen: Blood     Basic metabolic panel [[id number]]  (Abnormal) Collected: 10/31/23 0721    Lab Status: Final result Specimen: Blood from Arm, Right Updated: 10/31/23 0802     Sodium 139 mmol/L      Potassium 4.2 mmol/L      Chloride 104 mmol/L      CO2 28 mmol/L      ANION GAP 7 mmol/L      BUN 29 mg/dL      Creatinine 1.50 mg/dL      Glucose 155 mg/dL      Calcium 9.1 mg/dL      eGFR 42 ml/min/1.73sq m     Narrative: National Kidney Disease Foundation guidelines for Chronic Kidney Disease (CKD):     Stage 1 with normal or high GFR (GFR > 90 mL/min/1.73 square meters)    Stage 2 Mild CKD (GFR = 60-89 mL/min/1.73 square meters)    Stage 3A Moderate CKD (GFR = 45-59 mL/min/1.73 square meters)    Stage 3B Moderate CKD (GFR = 30-44 mL/min/1.73 square meters)    Stage 4 Severe CKD (GFR = 15-29 mL/min/1.73 square meters)    Stage 5 End Stage CKD (GFR <15 mL/min/1.73 square meters)  Note: GFR calculation is accurate only with a steady state creatinine    Magnesium [[id number]]  (Abnormal) Collected: 10/31/23 0721    Lab Status: Final result Specimen: Blood from Arm, Right Updated: 10/31/23 0802     Magnesium 1.8 mg/dL     TSH, 3rd generation with Free T4 reflex [[id number]]  (Normal) Collected: 10/31/23 0721    Lab Status: Final result Specimen: Blood from Arm, Right Updated: 10/31/23 0802     TSH 3RD GENERATON 3.290 uIU/mL     B-Type Natriuretic Peptide(BNP) [[id number]]  (Abnormal) Collected: 10/31/23 0721    Lab Status: Final result Specimen: Blood from Arm, Right Updated: 10/31/23 0758      pg/mL     HS Troponin 0hr (reflex protocol) [[id number]]  (Normal) Collected: 10/31/23 0721    Lab Status: Final result Specimen: Blood from Arm, Right Updated: 10/31/23 0754     hs TnI 0hr 16 ng/L     Protime-INR [[id number]]  (Normal) Collected: 10/31/23 0721    Lab Status: Final result Specimen: Blood from Arm, Right Updated: 10/31/23 0742     Protime 12.7 seconds      INR 0.93    CBC and differential [[id number]] Collected: 10/31/23 0721    Lab Status: Final result Specimen: Blood from Arm, Right Updated: 10/31/23 0728     WBC 5.23 Thousand/uL      RBC 4.96 Million/uL      Hemoglobin 15.2 g/dL      Hematocrit 47.4 %      MCV 96 fL      MCH 30.6 pg      MCHC 32.1 g/dL      RDW 12.8 %      MPV 10.2 fL      Platelets 547 Thousands/uL      nRBC 0 /100 WBCs      Neutrophils Relative 67 %      Immat GRANS % 0 %      Lymphocytes Relative 26 % Monocytes Relative 5 %      Eosinophils Relative 2 %      Basophils Relative 0 %      Neutrophils Absolute 3.49 Thousands/µL      Immature Grans Absolute 0.01 Thousand/uL      Lymphocytes Absolute 1.34 Thousands/µL      Monocytes Absolute 0.25 Thousand/µL      Eosinophils Absolute 0.12 Thousand/µL      Basophils Absolute 0.02 Thousands/µL                    XR chest 1 view portable    (Results Pending)              Procedures  Procedures         ED Course                               SBIRT 22yo+      Flowsheet Row Most Recent Value   Initial Alcohol Screen: US AUDIT-C     1. How often do you have a drink containing alcohol? 0 Filed at: 10/31/2023 0713   2. How many drinks containing alcohol do you have on a typical day you are drinking? 0 Filed at: 10/31/2023 0713   3a. Male UNDER 65: How often do you have five or more drinks on one occasion? 0 Filed at: 10/31/2023 0713   3b. FEMALE Any Age, or MALE 65+: How often do you have 4 or more drinks on one occassion? 0 Filed at: 10/31/2023 0713   Audit-C Score 0 Filed at: 10/31/2023 5524   PINKY: How many times in the past year have you. .. Used an illegal drug or used a prescription medication for non-medical reasons? Never Filed at: 10/31/2023 5528                      Medical Decision Making  This is a 21-year-old male with a history of atrial ectopy presented with complaint of palpitation and something stuck in his throat. Patient is awake alert oriented times GCS 15. Differential diagnoses include pill esophagitis/mass/arrhythmia/electrolyte imbalance/hyper thyroid    Plan will obtain labs CBC BMP troponin BNP TSH and a chest x-ray. Patient heart rate initially was 135 then dropped to 80 we will continue to observe. We will hold off Cardizem at this time. Patient was observed in the ED states feeling much better denies any sensation in his throat at this time. Patient rate admitted 60 and regular rhythm.   Patient is requesting to be discharged states that "I need to go hunting and I feel better will follow-up with my cardiology ". Family by bedside. Agree with the plan. Disposition discharge home with strict precaution to return to the ED if symptoms not improve or worsen. Amount and/or Complexity of Data Reviewed  Labs: ordered. Radiology: ordered. Risk  Prescription drug management. Disposition  Final diagnoses:   Palpitations   Pill esophagitis     Time reflects when diagnosis was documented in both MDM as applicable and the Disposition within this note       Time User Action Codes Description Comment    10/31/2023  9:25 AM Jas Starks Add [R00.2] Palpitations     10/31/2023  9:25 AM Jas Starks Add [K20.80,  T50.905A] Pill esophagitis           ED Disposition       ED Disposition   Discharge    Condition   Stable    Date/Time   Tue Oct 31, 2023 [id number] [facility] discharge to home/self care.                    Follow-up Information       Follow up With Specialties Details Why Contact Info    Vadim Sarah MD Emergency Medicine, Internal Medicine Schedule an appointment as soon as possible for a visit in 2 days If symptoms worsen [id number] [address]  [phone]              Patient's Medications   Discharge Prescriptions    No medications on file           PDMP Review       None            ED Provider  Electronically Signed by             Jas Starks MD  10/31/23 7381

## 2024-01-03 ENCOUNTER — APPOINTMENT (OUTPATIENT)
Dept: LAB | Facility: HOSPITAL | Age: 84
End: 2024-01-03
Payer: COMMERCIAL

## 2024-01-03 DIAGNOSIS — N42.9 PROSTATE DISORDER: ICD-10-CM

## 2024-01-03 DIAGNOSIS — R73.9 HYPERGLYCEMIA: Primary | ICD-10-CM

## 2024-01-03 DIAGNOSIS — E78.5 HYPERLIPIDEMIA, UNSPECIFIED HYPERLIPIDEMIA TYPE: ICD-10-CM

## 2024-01-03 DIAGNOSIS — R60.9 EDEMA, UNSPECIFIED TYPE: ICD-10-CM

## 2024-01-03 DIAGNOSIS — E55.9 VITAMIN D DEFICIENCY: ICD-10-CM

## 2024-01-03 LAB
25(OH)D3 SERPL-MCNC: 31.9 NG/ML (ref 30–100)
ALBUMIN SERPL BCP-MCNC: 4.1 G/DL (ref 3.5–5)
ALP SERPL-CCNC: 85 U/L (ref 34–104)
ALT SERPL W P-5'-P-CCNC: 13 U/L (ref 7–52)
ANION GAP SERPL CALCULATED.3IONS-SCNC: 7 MMOL/L
AST SERPL W P-5'-P-CCNC: 17 U/L (ref 13–39)
BILIRUB SERPL-MCNC: 0.56 MG/DL (ref 0.2–1)
BNP SERPL-MCNC: 67 PG/ML (ref 0–100)
BUN SERPL-MCNC: 27 MG/DL (ref 5–25)
CALCIUM SERPL-MCNC: 9.3 MG/DL (ref 8.4–10.2)
CHLORIDE SERPL-SCNC: 102 MMOL/L (ref 96–108)
CHOLEST SERPL-MCNC: 179 MG/DL
CO2 SERPL-SCNC: 29 MMOL/L (ref 21–32)
CREAT SERPL-MCNC: 1.64 MG/DL (ref 0.6–1.3)
ERYTHROCYTE [DISTWIDTH] IN BLOOD BY AUTOMATED COUNT: 12.3 % (ref 11.6–15.1)
EST. AVERAGE GLUCOSE BLD GHB EST-MCNC: 123 MG/DL
GFR SERPL CREATININE-BSD FRML MDRD: 38 ML/MIN/1.73SQ M
GLUCOSE P FAST SERPL-MCNC: 93 MG/DL (ref 65–99)
HBA1C MFR BLD: 5.9 %
HCT VFR BLD AUTO: 46.2 % (ref 36.5–49.3)
HDLC SERPL-MCNC: 44 MG/DL
HGB BLD-MCNC: 15 G/DL (ref 12–17)
LDLC SERPL CALC-MCNC: 117 MG/DL (ref 0–100)
MCH RBC QN AUTO: 30.5 PG (ref 26.8–34.3)
MCHC RBC AUTO-ENTMCNC: 32.5 G/DL (ref 31.4–37.4)
MCV RBC AUTO: 94 FL (ref 82–98)
NONHDLC SERPL-MCNC: 135 MG/DL
PLATELET # BLD AUTO: 172 THOUSANDS/UL (ref 149–390)
PMV BLD AUTO: 10 FL (ref 8.9–12.7)
POTASSIUM SERPL-SCNC: 4.4 MMOL/L (ref 3.5–5.3)
PROT SERPL-MCNC: 7.1 G/DL (ref 6.4–8.4)
PSA SERPL-MCNC: 11.99 NG/ML (ref 0–4)
RBC # BLD AUTO: 4.91 MILLION/UL (ref 3.88–5.62)
SODIUM SERPL-SCNC: 138 MMOL/L (ref 135–147)
T4 FREE SERPL-MCNC: 1.39 NG/DL (ref 0.61–1.12)
TRIGL SERPL-MCNC: 89 MG/DL
TSH SERPL DL<=0.05 MIU/L-ACNC: 2.32 UIU/ML (ref 0.45–4.5)
WBC # BLD AUTO: 3.55 THOUSAND/UL (ref 4.31–10.16)

## 2024-01-03 PROCEDURE — 80061 LIPID PANEL: CPT

## 2024-01-03 PROCEDURE — 36415 COLL VENOUS BLD VENIPUNCTURE: CPT

## 2024-01-03 PROCEDURE — 82306 VITAMIN D 25 HYDROXY: CPT

## 2024-01-03 PROCEDURE — 84153 ASSAY OF PSA TOTAL: CPT

## 2024-01-03 PROCEDURE — 80053 COMPREHEN METABOLIC PANEL: CPT

## 2024-01-03 PROCEDURE — 83880 ASSAY OF NATRIURETIC PEPTIDE: CPT

## 2024-01-03 PROCEDURE — 84439 ASSAY OF FREE THYROXINE: CPT

## 2024-01-03 PROCEDURE — 84443 ASSAY THYROID STIM HORMONE: CPT

## 2024-01-03 PROCEDURE — 85027 COMPLETE CBC AUTOMATED: CPT

## 2024-01-03 PROCEDURE — 83036 HEMOGLOBIN GLYCOSYLATED A1C: CPT

## 2024-04-17 ENCOUNTER — APPOINTMENT (OUTPATIENT)
Dept: LAB | Facility: HOSPITAL | Age: 84
End: 2024-04-17
Payer: COMMERCIAL

## 2024-04-17 DIAGNOSIS — R97.20 ELEVATED PROSTATE SPECIFIC ANTIGEN (PSA): ICD-10-CM

## 2024-04-17 LAB — PSA SERPL-MCNC: 12.62 NG/ML (ref 0–4)

## 2024-04-17 PROCEDURE — 84153 ASSAY OF PSA TOTAL: CPT

## 2024-04-17 PROCEDURE — 36415 COLL VENOUS BLD VENIPUNCTURE: CPT

## 2024-04-25 ENCOUNTER — OFFICE VISIT (OUTPATIENT)
Dept: URGENT CARE | Facility: CLINIC | Age: 84
End: 2024-04-25
Payer: COMMERCIAL

## 2024-04-25 VITALS
HEART RATE: 64 BPM | OXYGEN SATURATION: 96 % | SYSTOLIC BLOOD PRESSURE: 131 MMHG | DIASTOLIC BLOOD PRESSURE: 64 MMHG | RESPIRATION RATE: 18 BRPM | TEMPERATURE: 97.4 F

## 2024-04-25 DIAGNOSIS — M70.22 OLECRANON BURSITIS OF LEFT ELBOW: Primary | ICD-10-CM

## 2024-04-25 PROCEDURE — S9083 URGENT CARE CENTER GLOBAL: HCPCS | Performed by: PHYSICIAN ASSISTANT

## 2024-04-25 PROCEDURE — 99213 OFFICE O/P EST LOW 20 MIN: CPT | Performed by: PHYSICIAN ASSISTANT

## 2024-04-25 RX ORDER — METHYLPREDNISOLONE 4 MG/1
TABLET ORAL
Qty: 1 EACH | Refills: 0 | Status: SHIPPED | OUTPATIENT
Start: 2024-04-25

## 2024-04-25 NOTE — PATIENT INSTRUCTIONS
Elbow Bursitis   WHAT YOU NEED TO KNOW:   Elbow bursitis is inflammation of the bursa in your elbow. The bursa is a fluid-filled sac that acts as a cushion between a bone and a tendon. A tendon is a cord of strong tissue that connects muscles to bones. The bursa is located right under the point of your elbow.       DISCHARGE INSTRUCTIONS:   Call your doctor if:   Your pain and swelling increase.    Your symptoms do not improve after 10 days of treatment.    You have a fever.    You have questions or concerns about your condition or care.    Medicines:  You may need any of the following:  NSAIDs , such as ibuprofen, help decrease swelling, pain, and fever. NSAIDs can cause stomach bleeding or kidney problems in certain people. If you take blood thinner medicine, always ask your healthcare provider if NSAIDs are safe for you. Always read the medicine label and follow directions.    Aspirin  helps relieve pain and swelling. Take aspirin exactly as directed by your healthcare provider.    Antibiotics  help fight an infection caused by bacteria.    Steroids  help decrease pain and swelling. Steroids may be given for a short time to relieve acute pain.    Take your medicine as directed.  Contact your healthcare provider if you think your medicine is not helping or if you have side effects. Tell your provider if you are allergic to any medicine. Keep a list of the medicines, vitamins, and herbs you take. Include the amounts, and when and why you take them. Bring the list or the pill bottles to follow-up visits. Carry your medicine list with you in case of an emergency.    Manage elbow bursitis:   Rest your elbow as much as possible to decrease pain and swelling.  Slowly start to do more each day. Return to your daily activities as directed. Do not  bend your elbow all the way or lift anything heavy while your elbow is healing. An occupational therapist can help you find ways to keep your elbow rested that will help with  the type of work you do. For example, arm rests that do not touch the elbow can make it easier to work at a computer.    Use an elbow pad while your elbow heals.  An elbow pad will cushion and protect your elbow. Your healthcare provider can tell you the kind of elbow pad to use. He or she can also tell you how long to wear it each day, and for how many days to use it.    Apply ice to help decrease swelling and pain.  Use an ice pack, or put crushed ice in a plastic bag. Cover the bag with a towel before you place it on your elbow. Apply ice for 15 to 20 minutes, 3 to 4 times each day, as directed.    Use compression to help decrease swelling.  Healthcare providers may wrap your arm with tape or an elastic bandage. Loosen the elastic bandage if you start to lose feeling in your fingers.         Elevate (raise) your elbow above the level of your heart as often as you can.  This will help decrease swelling and pain. Prop your elbow on pillows or blankets to keep it elevated comfortably.         Go to physical therapy, if directed.  A physical therapist teaches you exercises to help improve movement and strength, and to decrease pain.    Prevent elbow bursitis:   Avoid injury and pressure to your elbows.  Wear elbow pads or protectors when you play sports. Do not lean on your elbows or clench your fists. Do not tightly  small items, such as tools or pens.    Stretch, warm up, and cool down.  Always stretch and do warmup and cool-down exercises before and after you exercise. This will help loosen your muscles and decrease stress on your elbow. Rest between workouts.    Follow up with your doctor as directed:  Write down your questions so you remember to ask them during your visits.  © Copyright [employer] 2023 Information is for End User's use only and may not be sold, redistributed or otherwise used for commercial purposes.  The above information is an  only. It is not intended as medical advice for  individual conditions or treatments. Talk to your doctor, nurse or pharmacist before following any medical regimen to see if it is safe and effective for you.

## 2024-04-25 NOTE — PROGRESS NOTES
[facility]        NAME: Henry Hickman is a 83 y.o. male  : 1940    MRN: [id number]  DATE: 2024  TIME: 9:20 AM    Assessment and Plan   Olecranon bursitis of left elbow [M70.22]  1. Olecranon bursitis of left elbow  methylPREDNISolone 4 MG tablet therapy pack            Patient Instructions       Follow up with PCP in 3-5 days.  Proceed to  ER if symptoms worsen.    If tests have been performed at [facility], our office will contact you with results if changes need to be made to the care plan discussed with you at the visit.  [facility] on [facility]    Chief Complaint     Chief Complaint   Patient presents with    Elbow Pain     Started yesterday          History of Present Illness       Patient is an 82 y/o/m presenting to [facility] with left elbow pain and swelling.  Patient reports noticing this yesterday.  Patient applied ice for this.  Patient takes a baby Aspirin daily.  Patient denies known injury.  No fever, chills or other constitutional symptoms.  Region is warm to the touch.     Elbow Pain  Associated symptoms include arthralgias (Left elbow swelling and pain). Pertinent negatives include no abdominal pain, chest pain, chills, coughing, fever, rash, sore throat or vomiting.       Review of Systems   Review of Systems   Constitutional:  Negative for chills and fever.   HENT:  Negative for ear pain and sore throat.    Eyes:  Negative for pain and visual disturbance.   Respiratory:  Negative for cough and shortness of breath.    Cardiovascular:  Negative for chest pain and palpitations.   Gastrointestinal:  Negative for abdominal pain and vomiting.   Genitourinary:  Negative for dysuria and hematuria.   Musculoskeletal:  Positive for arthralgias (Left elbow swelling and pain). Negative for back pain.   Skin:  Negative for color change and rash.   Neurological:  Negative for seizures and syncope.   All other systems reviewed and are  negative.        Current Medications       Current Outpatient Medications:     methylPREDNISolone 4 MG tablet therapy pack, Use as directed on package, Disp: 1 each, Rfl: 0    aspirin (ECOTRIN LOW STRENGTH) 81 mg EC tablet, Take 1 tablet by mouth daily, Disp: , Rfl:     Cholecalciferol 50 MCG (2000 UT) CAPS, Take 1 capsule by mouth daily Pt reported taking every other day, Disp: , Rfl:     famotidine (PEPCID) 20 mg tablet, Take 20 mg by mouth 2 (two) times a day, Disp: , Rfl:     levothyroxine 75 mcg tablet, Take 1 tablet by mouth daily, Disp: , Rfl:     tamsulosin (FLOMAX) 0.4 mg, Take 0.8 mg by mouth daily with dinner, Disp: , Rfl:     Current Allergies     Allergies as of 04/25/2024 - Reviewed 04/25/2024   Allergen Reaction Noted    Clarithromycin Other (See Comments) 12/24/2013            The following portions of the patient's history were reviewed and updated as appropriate: allergies, current medications, past family history, past medical history, past social history, past surgical history and problem list.     Past Medical History:   Diagnosis Date    BPH (benign prostatic hyperplasia)     Disease of thyroid gland     GERD (gastroesophageal reflux disease)     PVC's (premature ventricular contractions)        Past Surgical History:   Procedure Laterality Date    EYE SURGERY      HERNIA REPAIR      ROTATOR CUFF REPAIR         No family history on file.      Medications have been verified.        Objective   /64   Pulse 64   Temp (!) 97.4 °F (36.3 °C)   Resp 18   SpO2 96%   No LMP for male patient.       Physical Exam     Physical Exam  Constitutional:       Appearance: Normal appearance. He is normal weight.   HENT:      Head: Normocephalic and atraumatic.      Nose: Nose normal.      Mouth/Throat:      Mouth: Mucous membranes are moist.   Eyes:      Extraocular Movements: Extraocular movements intact.      Conjunctiva/sclera: Conjunctivae normal.      Pupils: Pupils are equal, round, and reactive  to light.   Cardiovascular:      Rate and Rhythm: Normal rate.      Heart sounds: No murmur heard.     No friction rub. No gallop.   Pulmonary:      Effort: Pulmonary effort is normal.      Breath sounds: No wheezing, rhonchi or rales.   Musculoskeletal:         General: Normal range of motion.        Arms:       Cervical back: Normal range of motion and neck supple.      Comments: Mild-Moderate edema, non tender w/ generalized warmth.  No induration.  Uniform fluctuance.  No lesions or skin punctures    Skin:     General: Skin is warm and dry.   Neurological:      General: No focal deficit present.      Mental Status: He is alert and oriented to person, place, and time.   Psychiatric:         Mood and Affect: Mood normal.         Behavior: Behavior normal.

## 2024-05-09 ENCOUNTER — APPOINTMENT (OUTPATIENT)
Dept: LAB | Facility: HOSPITAL | Age: 84
End: 2024-05-09
Payer: COMMERCIAL

## 2024-05-09 DIAGNOSIS — Z79.899 ENCOUNTER FOR LONG-TERM (CURRENT) USE OF OTHER MEDICATIONS: ICD-10-CM

## 2024-05-09 DIAGNOSIS — R73.9 HYPERGLYCEMIA: ICD-10-CM

## 2024-05-09 DIAGNOSIS — E03.9 HYPOTHYROIDISM, UNSPECIFIED TYPE: ICD-10-CM

## 2024-05-09 LAB
25(OH)D3 SERPL-MCNC: 35.6 NG/ML (ref 30–100)
ALBUMIN SERPL BCP-MCNC: 3.8 G/DL (ref 3.5–5)
ALP SERPL-CCNC: 58 U/L (ref 34–104)
ALT SERPL W P-5'-P-CCNC: 14 U/L (ref 7–52)
ANION GAP SERPL CALCULATED.3IONS-SCNC: 6 MMOL/L (ref 4–13)
AST SERPL W P-5'-P-CCNC: 18 U/L (ref 13–39)
BILIRUB SERPL-MCNC: 0.7 MG/DL (ref 0.2–1)
BUN SERPL-MCNC: 33 MG/DL (ref 5–25)
CALCIUM SERPL-MCNC: 9 MG/DL (ref 8.4–10.2)
CHLORIDE SERPL-SCNC: 105 MMOL/L (ref 96–108)
CO2 SERPL-SCNC: 29 MMOL/L (ref 21–32)
CREAT SERPL-MCNC: 1.45 MG/DL (ref 0.6–1.3)
ERYTHROCYTE [DISTWIDTH] IN BLOOD BY AUTOMATED COUNT: 13.2 % (ref 11.6–15.1)
EST. AVERAGE GLUCOSE BLD GHB EST-MCNC: 117 MG/DL
GFR SERPL CREATININE-BSD FRML MDRD: 44 ML/MIN/1.73SQ M
GLUCOSE P FAST SERPL-MCNC: 90 MG/DL (ref 65–99)
HBA1C MFR BLD: 5.7 %
HCT VFR BLD AUTO: 43.4 % (ref 36.5–49.3)
HGB BLD-MCNC: 13.5 G/DL (ref 12–17)
MCH RBC QN AUTO: 29.7 PG (ref 26.8–34.3)
MCHC RBC AUTO-ENTMCNC: 31.1 G/DL (ref 31.4–37.4)
MCV RBC AUTO: 95 FL (ref 82–98)
PLATELET # BLD AUTO: 162 THOUSANDS/UL (ref 149–390)
PMV BLD AUTO: 10.5 FL (ref 8.9–12.7)
POTASSIUM SERPL-SCNC: 4.8 MMOL/L (ref 3.5–5.3)
PROT SERPL-MCNC: 6.3 G/DL (ref 6.4–8.4)
RBC # BLD AUTO: 4.55 MILLION/UL (ref 3.88–5.62)
SODIUM SERPL-SCNC: 140 MMOL/L (ref 135–147)
T4 FREE SERPL-MCNC: 1.27 NG/DL (ref 0.61–1.12)
TSH SERPL DL<=0.05 MIU/L-ACNC: 1.51 UIU/ML (ref 0.45–4.5)
WBC # BLD AUTO: 4.15 THOUSAND/UL (ref 4.31–10.16)

## 2024-05-09 PROCEDURE — 84439 ASSAY OF FREE THYROXINE: CPT

## 2024-05-09 PROCEDURE — 85027 COMPLETE CBC AUTOMATED: CPT

## 2024-05-09 PROCEDURE — 80053 COMPREHEN METABOLIC PANEL: CPT

## 2024-05-09 PROCEDURE — 84443 ASSAY THYROID STIM HORMONE: CPT

## 2024-05-09 PROCEDURE — 83036 HEMOGLOBIN GLYCOSYLATED A1C: CPT

## 2024-05-09 PROCEDURE — 36415 COLL VENOUS BLD VENIPUNCTURE: CPT

## 2024-05-09 PROCEDURE — 82306 VITAMIN D 25 HYDROXY: CPT

## 2024-06-03 ENCOUNTER — OFFICE VISIT (OUTPATIENT)
Dept: CARDIOLOGY CLINIC | Facility: CLINIC | Age: 84
End: 2024-06-03
Payer: COMMERCIAL

## 2024-06-03 VITALS
WEIGHT: 215 LBS | DIASTOLIC BLOOD PRESSURE: 80 MMHG | SYSTOLIC BLOOD PRESSURE: 130 MMHG | BODY MASS INDEX: 29.12 KG/M2 | HEIGHT: 72 IN | HEART RATE: 48 BPM

## 2024-06-03 DIAGNOSIS — R00.1 BRADYCARDIA: ICD-10-CM

## 2024-06-03 DIAGNOSIS — I25.119 CORONARY ARTERY DISEASE INVOLVING NATIVE CORONARY ARTERY OF NATIVE HEART WITH ANGINA PECTORIS (HCC): Primary | ICD-10-CM

## 2024-06-03 DIAGNOSIS — I71.21 ANEURYSM OF ASCENDING AORTA WITHOUT RUPTURE (HCC): ICD-10-CM

## 2024-06-03 DIAGNOSIS — I25.10 CORONARY ARTERY DISEASE INVOLVING NATIVE CORONARY ARTERY OF NATIVE HEART WITHOUT ANGINA PECTORIS: ICD-10-CM

## 2024-06-03 PROCEDURE — 99214 OFFICE O/P EST MOD 30 MIN: CPT | Performed by: INTERNAL MEDICINE

## 2024-06-03 PROCEDURE — 93000 ELECTROCARDIOGRAM COMPLETE: CPT | Performed by: INTERNAL MEDICINE

## 2024-06-03 NOTE — PROGRESS NOTES
Patient ID: Henry Hickman is a 84 y.o. male.        Plan:      Bradycardia  Longstanding but no symptoms.    Coronary artery disease involving native coronary artery of native heart without angina pectoris  Prior angina but no recent symptoms.  Myoview last year was normal.    Thoracic aortic aneurysm (HCC)  Mild but stable.  Will reassess by echo next year.       Follow up Plan/Other summary comments:  Return in about 1 year (around 6/17/2025).    HPI: Patient is seen in follow-up today regarding the above.  Since the last visit he has felt well.  He continues to be active.  He is now  more than 60 years.  No recent change in exertional capacity.      Results for orders placed or performed in visit on 06/03/24   POCT ECG    Impression    Sinus bradycardia at 48/minute. Otherwise WNL.         Most recent or relevant cardiac/vascular testing:    TTE 12/18/2020: Normal LV systolic function.  4.3 cm ascending aorta.  TTE 6/22/2023: No change.    Myoview 6/22/2023: WNL.    Past Surgical History:   Procedure Laterality Date    EYE SURGERY      HERNIA REPAIR      ROTATOR CUFF REPAIR         Lipid Profile: Reviewed      Review of Systems   10  point ROS  was otherwise non pertinent or negative except as per HPI or as below.   Gait:  Normal.        Objective:     /80   Pulse (!) 48   Ht 6' (1.829 m)   Wt 97.5 kg (215 lb)   BMI 29.16 kg/m²     PHYSICAL EXAM:    General:  Normal appearance in no distress.  Eyes:  Anicteric.  Oral mucosa:  Moist.  Neck:  No JVD. Carotid upstrokes are brisk without bruits.  No masses.  Chest:  Clear to auscultation.  Cardiac:  No palpable PMI.  Normal S1 and S2.  No murmur gallop or rub.  Abdomen:  Soft and nontender. No palpable organomegaly or aortic enlargement.  Extremities:  No peripheral edema.  Musculoskeletal:  Symmetric.   Vascular:  Femoral pulses are brisk without bruits.  Popliteal pulses are intact bilaterally.   Pedal pulses are intact.  Neuro:  Grossly  symmetric.  Psych:  Alert and oriented x3.      Meds reviewed.    Past Medical History:   Diagnosis Date    BPH (benign prostatic hyperplasia)     Disease of thyroid gland     GERD (gastroesophageal reflux disease)     PVC's (premature ventricular contractions)            Social History     Tobacco Use   Smoking Status Former    Current packs/day: 0.00    Average packs/day: 0.8 packs/day for 14.0 years (10.5 ttl pk-yrs)    Types: Cigarettes    Start date:     Quit date:     Years since quittin.4   Smokeless Tobacco Never

## 2024-10-14 ENCOUNTER — APPOINTMENT (OUTPATIENT)
Dept: LAB | Facility: HOSPITAL | Age: 84
End: 2024-10-14
Payer: COMMERCIAL

## 2024-10-14 DIAGNOSIS — R97.20 ELEVATED PROSTATE SPECIFIC ANTIGEN (PSA): ICD-10-CM

## 2024-10-14 LAB — PSA SERPL-MCNC: 17.21 NG/ML (ref 0–4)

## 2024-10-14 PROCEDURE — 36415 COLL VENOUS BLD VENIPUNCTURE: CPT

## 2024-10-14 PROCEDURE — G0103 PSA SCREENING: HCPCS

## 2024-11-12 ENCOUNTER — APPOINTMENT (OUTPATIENT)
Dept: LAB | Facility: HOSPITAL | Age: 84
End: 2024-11-12
Payer: COMMERCIAL

## 2024-11-12 DIAGNOSIS — E78.5 HYPERLIPIDEMIA, UNSPECIFIED HYPERLIPIDEMIA TYPE: ICD-10-CM

## 2024-11-12 DIAGNOSIS — I10 HYPERTENSION, UNSPECIFIED TYPE: ICD-10-CM

## 2024-11-12 DIAGNOSIS — R63.5 WEIGHT GAIN: ICD-10-CM

## 2024-11-12 DIAGNOSIS — R73.9 HYPERGLYCEMIA: ICD-10-CM

## 2024-11-12 LAB
25(OH)D3 SERPL-MCNC: 48.1 NG/ML (ref 30–100)
ALBUMIN SERPL BCG-MCNC: 4 G/DL (ref 3.5–5)
ALP SERPL-CCNC: 64 U/L (ref 34–104)
ALT SERPL W P-5'-P-CCNC: 11 U/L (ref 7–52)
ANION GAP SERPL CALCULATED.3IONS-SCNC: 6 MMOL/L (ref 4–13)
AST SERPL W P-5'-P-CCNC: 15 U/L (ref 13–39)
BILIRUB SERPL-MCNC: 0.76 MG/DL (ref 0.2–1)
BUN SERPL-MCNC: 30 MG/DL (ref 5–25)
CALCIUM SERPL-MCNC: 8.8 MG/DL (ref 8.4–10.2)
CHLORIDE SERPL-SCNC: 105 MMOL/L (ref 96–108)
CHOLEST SERPL-MCNC: 164 MG/DL
CO2 SERPL-SCNC: 28 MMOL/L (ref 21–32)
CREAT SERPL-MCNC: 1.5 MG/DL (ref 0.6–1.3)
ERYTHROCYTE [DISTWIDTH] IN BLOOD BY AUTOMATED COUNT: 12.8 % (ref 11.6–15.1)
EST. AVERAGE GLUCOSE BLD GHB EST-MCNC: 120 MG/DL
GFR SERPL CREATININE-BSD FRML MDRD: 42 ML/MIN/1.73SQ M
GLUCOSE P FAST SERPL-MCNC: 91 MG/DL (ref 65–99)
HBA1C MFR BLD: 5.8 %
HCT VFR BLD AUTO: 43.9 % (ref 36.5–49.3)
HDLC SERPL-MCNC: 48 MG/DL
HGB BLD-MCNC: 14.1 G/DL (ref 12–17)
LDLC SERPL CALC-MCNC: 102 MG/DL (ref 0–100)
MCH RBC QN AUTO: 30.1 PG (ref 26.8–34.3)
MCHC RBC AUTO-ENTMCNC: 32.1 G/DL (ref 31.4–37.4)
MCV RBC AUTO: 94 FL (ref 82–98)
NONHDLC SERPL-MCNC: 116 MG/DL
PLATELET # BLD AUTO: 167 THOUSANDS/UL (ref 149–390)
PMV BLD AUTO: 10.4 FL (ref 8.9–12.7)
POTASSIUM SERPL-SCNC: 4.6 MMOL/L (ref 3.5–5.3)
PROT SERPL-MCNC: 6.7 G/DL (ref 6.4–8.4)
RBC # BLD AUTO: 4.69 MILLION/UL (ref 3.88–5.62)
SODIUM SERPL-SCNC: 139 MMOL/L (ref 135–147)
T4 FREE SERPL-MCNC: 1.12 NG/DL (ref 0.61–1.12)
TRIGL SERPL-MCNC: 70 MG/DL
TSH SERPL DL<=0.05 MIU/L-ACNC: 2.66 UIU/ML (ref 0.45–4.5)
WBC # BLD AUTO: 3.58 THOUSAND/UL (ref 4.31–10.16)

## 2024-11-12 PROCEDURE — 80061 LIPID PANEL: CPT

## 2024-11-12 PROCEDURE — 83036 HEMOGLOBIN GLYCOSYLATED A1C: CPT

## 2024-11-12 PROCEDURE — 82306 VITAMIN D 25 HYDROXY: CPT

## 2024-11-12 PROCEDURE — 84439 ASSAY OF FREE THYROXINE: CPT

## 2024-11-12 PROCEDURE — 84443 ASSAY THYROID STIM HORMONE: CPT

## 2024-11-12 PROCEDURE — 36415 COLL VENOUS BLD VENIPUNCTURE: CPT

## 2024-11-12 PROCEDURE — 85027 COMPLETE CBC AUTOMATED: CPT

## 2024-11-12 PROCEDURE — 80053 COMPREHEN METABOLIC PANEL: CPT

## 2025-02-04 ENCOUNTER — HOSPITAL ENCOUNTER (OUTPATIENT)
Dept: ULTRASOUND IMAGING | Facility: HOSPITAL | Age: 85
Discharge: HOME | End: 2025-02-04
Attending: UROLOGY
Payer: COMMERCIAL

## 2025-02-04 DIAGNOSIS — R97.20 ELEVATED PSA: ICD-10-CM

## 2025-02-04 PROCEDURE — G0416 PROSTATE BIOPSY, ANY MTHD: HCPCS | Performed by: PATHOLOGY

## 2025-02-04 PROCEDURE — 88342 IMHCHEM/IMCYTCHM 1ST ANTB: CPT | Performed by: PATHOLOGY

## 2025-02-04 PROCEDURE — 76942 ECHO GUIDE FOR BIOPSY: CPT

## 2025-02-04 PROCEDURE — 55700 HB BIOPSY OF PROSTATE: CPT

## 2025-02-04 RX ORDER — LIDOCAINE HYDROCHLORIDE 20 MG/ML
1 JELLY TOPICAL ONCE
Status: COMPLETED | OUTPATIENT
Start: 2025-02-04 | End: 2025-02-04

## 2025-02-04 RX ORDER — LIDOCAINE HYDROCHLORIDE 10 MG/ML
10 INJECTION, SOLUTION EPIDURAL; INFILTRATION; INTRACAUDAL; PERINEURAL ONCE
Status: COMPLETED | OUTPATIENT
Start: 2025-02-04 | End: 2025-02-04

## 2025-02-04 RX ADMIN — LIDOCAINE HYDROCHLORIDE 1 APPLICATION: 20 JELLY TOPICAL at 10:05

## 2025-02-04 RX ADMIN — LIDOCAINE HYDROCHLORIDE 10 ML: 10 INJECTION, SOLUTION EPIDURAL; INFILTRATION; INTRACAUDAL; PERINEURAL at 10:07

## 2025-02-04 NOTE — OP NOTE
Preoperative diagnosis  Elevated PSA  Elevated PSA velocity  Abnormal digital rectal exam  Postoperative diagnosis  Same, pathology pending  Procedure  Ultrasound-guided transrectal needle biopsy of the prostate  Surgeon  Dr. Jha  Anesthesia  Local  Brief history  This is an 84-year-old male, with elevated PSA, elevated PSA velocity and right apical prostate nodule.  Biopsy is being pursued to rule out prostate cancer  Procedure  Patient was identified  Timeout was taken  Local anesthetic was instilled per rectum  Ultrasound probe was placed  Imaging of the prostate gland was done  Under ultrasound guidance local anesthetic was instilled at the base of the prostate bilaterally  Further imaging of the prostate gland was done  Under ultrasound guidance tissue cores were obtained  6 tissue cores were obtained from the left  8 tissue cores were obtained from the right  Tissue cores were submitted in separate containers marked left and right for pathologic review  Patient tolerated the procedure well  Ultrasound probe was withdrawn  Patient was discharged with written postoperative instructions  He is to follow-up at the office to review the pathology results  Radiology will be dictating a separate report which reflects the prostate ultrasound findings

## 2025-02-06 PROCEDURE — G0416 PROSTATE BIOPSY, ANY MTHD: HCPCS | Performed by: PATHOLOGY

## 2025-02-06 PROCEDURE — 88342 IMHCHEM/IMCYTCHM 1ST ANTB: CPT | Performed by: PATHOLOGY

## 2025-02-07 NOTE — TELEPHONE ENCOUNTER
Spoke with pt in regards to scheduling a f/u appointment with Dr Mino Magaña post pulmonary testing per [facility] AP request via inbasket  Pt stated the Dr was present & performed the test  Dr told pt he does not believe it is a pulmonary issue & should follow up with his cardiologist  Pt stated he is going to see the heart Dr & will call if he needs an appt   used

## 2025-02-12 ENCOUNTER — APPOINTMENT (OUTPATIENT)
Dept: LAB | Facility: HOSPITAL | Age: 85
End: 2025-02-12
Payer: COMMERCIAL

## 2025-02-12 DIAGNOSIS — C61 MALIGNANT NEOPLASM OF PROSTATE (HCC): ICD-10-CM

## 2025-02-12 DIAGNOSIS — N39.0 URINARY TRACT INFECTION WITHOUT HEMATURIA, SITE UNSPECIFIED: ICD-10-CM

## 2025-02-12 LAB
ANION GAP SERPL CALCULATED.3IONS-SCNC: 5 MMOL/L (ref 4–13)
BUN SERPL-MCNC: 24 MG/DL (ref 5–25)
CALCIUM SERPL-MCNC: 8.8 MG/DL (ref 8.4–10.2)
CHLORIDE SERPL-SCNC: 106 MMOL/L (ref 96–108)
CO2 SERPL-SCNC: 29 MMOL/L (ref 21–32)
CREAT SERPL-MCNC: 1.47 MG/DL (ref 0.6–1.3)
GFR SERPL CREATININE-BSD FRML MDRD: 43 ML/MIN/1.73SQ M
GLUCOSE P FAST SERPL-MCNC: 84 MG/DL (ref 65–99)
POTASSIUM SERPL-SCNC: 4.1 MMOL/L (ref 3.5–5.3)
SODIUM SERPL-SCNC: 140 MMOL/L (ref 135–147)

## 2025-02-12 PROCEDURE — 36415 COLL VENOUS BLD VENIPUNCTURE: CPT

## 2025-02-12 PROCEDURE — 80048 BASIC METABOLIC PNL TOTAL CA: CPT

## 2025-02-20 ENCOUNTER — HOSPITAL ENCOUNTER (OUTPATIENT)
Dept: CT IMAGING | Facility: HOSPITAL | Age: 85
End: 2025-02-20
Attending: UROLOGY
Payer: COMMERCIAL

## 2025-02-20 ENCOUNTER — HOSPITAL ENCOUNTER (OUTPATIENT)
Dept: NUCLEAR MEDICINE | Facility: HOSPITAL | Age: 85
End: 2025-02-20
Attending: UROLOGY
Payer: COMMERCIAL

## 2025-02-20 DIAGNOSIS — C61 MALIGNANT NEOPLASM OF PROSTATE (HCC): ICD-10-CM

## 2025-02-20 PROCEDURE — A9503 TC99M MEDRONATE: HCPCS

## 2025-02-20 PROCEDURE — 78306 BONE IMAGING WHOLE BODY: CPT

## 2025-02-20 PROCEDURE — 74177 CT ABD & PELVIS W/CONTRAST: CPT

## 2025-02-20 RX ADMIN — IOHEXOL 100 ML: 350 INJECTION, SOLUTION INTRAVENOUS at 12:29

## 2025-02-28 ENCOUNTER — PATIENT OUTREACH (OUTPATIENT)
Dept: HEMATOLOGY ONCOLOGY | Facility: CLINIC | Age: 85
End: 2025-02-28

## 2025-02-28 ENCOUNTER — DOCUMENTATION (OUTPATIENT)
Dept: HEMATOLOGY ONCOLOGY | Facility: CLINIC | Age: 85
End: 2025-02-28

## 2025-02-28 NOTE — PROGRESS NOTES
PN please retrieve outside records.     Referral received/ Chart reviewed for work up completed   Referral to: radiation oncology   Dx: prostate    Imaging completed: [x][facility] []Other:    [] PET/CT   [] MRI   [x] CT   [] US   [] Mammo   [x] Bone scan   [] N/A    Pathology completed: [x][facility] []Other:    Date: 2/4/25   Location:   []N/A    Additional records needed:   [] Genomic report   [] Genetic testing results   [x] Office Note Dr. Marquise Jha, urology    [] Procedure/ Operative note   [] Lab results   [] N/A      [] Radiation Oncology records retrieval needed (PN to route to rad/onc clerical pool once scheduled)  Date:  Location:      Urologist:      Dr. Jha        PSA Date:        Lab Results   Component Value Date    PSA 17.210 (H) 10/14/2024    PSA 12.62 (H) 04/17/2024    PSA 11.99 (H) 01/03/2024

## 2025-03-04 ENCOUNTER — PATIENT OUTREACH (OUTPATIENT)
Dept: HEMATOLOGY ONCOLOGY | Facility: CLINIC | Age: 85
End: 2025-03-04

## 2025-03-04 PROBLEM — C61 PROSTATE CANCER (HCC): Status: ACTIVE | Noted: 2025-02-04

## 2025-03-04 NOTE — PROGRESS NOTES
Oncology Nurse Navigator made call to patient due to referral to provider within [facility]. I spoke with patient about my role as an Oncology Nurse Navigator. I explained how to reach the office for any routine or urgent matters and the availability of patient navigation for non urgent matters. Explained oncology navigation is here to help patients through their journey and to offer assistance with any barriers to care. As a team, we strive to be patient advocates and help navigate healthcare system. Patient aware that oncologist's office will be primary contact once consult is complete and patient navigator will continue to offer support through entire journey. Conversation is based on evidence based care to optimize patient outcomes. Emotional support provided throughout conversation.     Evaluated for any barriers to care   Distress thermometer completed by PN   Genetic testing not indicated  Smoking status verified non smoker   Provided contact information for nurse navigator and patient navigator  Verified PCP/insurance on file   Discussed use of My Chart patient uses regularly. Message sent to reinforce contact information and support options     Do you have a history of cancer? Melanoma surgical intervention only   Have you ever received Radiation Therapy? No  Where  did you receive RT?    When?   Do you have any implantable devices?   [] Pacemaker  [] Pain stimulator  [] Defibrillator  [] Implanted sleep apnea device  [x] N/A    Was it placed at [facility]?    If not, where was it placed?    When?       Answered questions to pt's satisfaction.  Reviewed upcoming appts. Provided support and provided direct contact information for any questions or concerns.       Future Appointments   Date Time Provider Department Center   3/7/2025 10:00 AM Vaughn Barrios MD [facility] Rad Onc [facility]   6/2/2025  9:00 AM [facility] 1 [facility]   6/2/2025 10:00 AM Kanu Richardson MD [facility]io  [facility]

## 2025-03-04 NOTE — PROGRESS NOTES
Outreach to pt and left voicemail introducing myself and role as Nurse Navigator to assist with coordination of cancer care, preparation for upcoming appointment, be a point of contact prior to oncology consult,  provide support and connect with available resources.  Provided contact information, reviewed upcoming appts and requested call back.    Future Appointments   Date Time Provider Department Center   3/7/2025 10:00 AM Vaughn Barrios MD [facility] Rad Onc [facility]   6/2/2025  9:00 [facility]   6/2/2025 10:00 AM Kanu Richardson MD  [facility]io [facility]

## 2025-03-06 NOTE — ASSESSMENT & PLAN NOTE
Patient is a 84 y.o. 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 Discussed treatment options including prostatectomy and radiation therapy with androgen deprivation therapy. He has not previously discussed prostatectomy with anyone, and is not very interested in surgery at this time. Discussed the logistics of radiotherapy. Recommend hypofractionated prostate radiation to 70 Gy in 28 fractions if constraints permit, otherwise will consider standard fractionation to 44 fractions. Would also include his lymph node regions to 50.4 Gy given his high risk > 15% on [facility] nomogram. Discussed potential side effects including fatigue, dysuria, urinary and bowel frequency and urgency, diarrhea, sexual dysfunction, hematuria/hematochezia, cystitis/proctitis, small risk of fistulas, small risk of secondary malignancy. Given unfavorable intermediate risk disease, I would also recommend 6 months of concurrent and adjuvant ADT. Also discussed fiducial and rectal spacer placement. At this time, the patient is agreeable to radiation therapy, and informed consent was signed. CT simulation to be performed at [place] after ADT, fiducials, and rectal spacer.

## 2025-03-06 NOTE — PROGRESS NOTES
Radiation Oncology Consult  Name: Henry Hickman      : 1940      MRN: [id number]  Encounter Provider: Vaughn Barrios MD  Encounter Date: 3/7/2025   Encounter department: [facility] RADIATION ONCOLOGY  :  Assessment & Plan  Prostate cancer (HCC)    Patient is a 84 y.o. 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 Discussed treatment options including prostatectomy and radiation therapy with androgen deprivation therapy. He has not previously discussed prostatectomy with anyone, and is not very interested in surgery at this time. Discussed the logistics of radiotherapy. Recommend hypofractionated prostate radiation to 70 Gy in 28 fractions if constraints permit, otherwise will consider standard fractionation to 44 fractions. Would also include his lymph node regions to 50.4 Gy given his high risk > 15% on [facility] nomogram. Discussed potential side effects including fatigue, dysuria, urinary and bowel frequency and urgency, diarrhea, sexual dysfunction, hematuria/hematochezia, cystitis/proctitis, small risk of fistulas, small risk of secondary malignancy. Given unfavorable intermediate risk disease, I would also recommend 6 months of concurrent and adjuvant ADT. Also discussed fiducial and rectal spacer placement. At this time, the patient is agreeable to radiation therapy, and informed consent was signed. CT simulation to be performed at [place] after ADT, fiducials, and rectal spacer.        History of Present Illness   Prior Cancer Treatment:  - 15 left shoulder melanoma wide excision with SLNB    Implanted Devices:  none    Henry Hickman is a 84 y.o. 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 History of left shoulder melanoma s/p wide excision . Presented with elevated PSA and right apical prostate nodule. PSA was 5.6 on 3/11/20. He was being followed by an outside urologist with routine PSA checks, no prior biopsies or treatment discussions. Joy  up to 17.210 on 10/14/24. He sought a second opinion with a local urologist who performed further workup. Prostate biopsy on 2/4/25 showed a 102.1 cc gland, right prostate [place] 4+3 disease involving 3 of 8 right-sided cores with PNI, total 16 cores gathered. [facility] [place] risks at this time: OC 21%, EC 75%, LN 17%, SV 13%. CT abd/pelvis and bone scan on 2/20/25 showed no other sites of disease. Referred to discuss radiotherapy.    He is doing well today. He does have significant urinary symptoms as below. Most bothersome symptom is weak stream. He does take Flomax, however he said he started another medication more recently which he cannot remember the name at this time. No diarrhea, no hematuria or hematochezia. No other significant new symptoms.    Review of Systems:  Review of Systems   Constitutional:  Positive for fatigue. Negative for activity change and appetite change.   HENT: Negative.     Eyes: Negative.    Respiratory: Negative.     Cardiovascular: Negative.    Gastrointestinal:  Negative for blood in stool, constipation, diarrhea, nausea and vomiting.   Endocrine: Negative.    Genitourinary:  Positive for difficulty urinating. Negative for dysuria, frequency, hematuria and urgency.   Musculoskeletal: Negative.    Skin: Negative.    Allergic/Immunologic: Negative.    Neurological: Negative.    Hematological:  Bruises/bleeds easily.   Psychiatric/Behavioral: Negative.           IPSS Questionnaire (AUA-7):  Over the past month…     1)  How often have you had a sensation of not emptying your bladder completely after you finish urinating?  2 - Less than half the time   2)  How often have you had to urinate again less than two hours after you finished urinating? 0 - Not at all   3)  How often have you found you stopped and started again several times when you urinated?  5 - Almost always   4) How difficult have you found it to postpone urination?  0 - Not at all   5) How often have you had a weak urinary  stream?  5 - Almost always   6) How often have you had to push or strain to begin urination?  3 - About half the time   7) How many times did you most typically get up to urinate from the time you went to bed until the time you got up in the morning?  2 - 2 times   Total Score:  16       Pertinent Medical History   Oncology History   Cancer Staging   Prostate cancer (HCC)  Staging form: Prostate, AJCC 8th Edition  - Clinical stage from 3/6/2025: Stage IIC (cT2a, cN0, cM0, PSA: 17.2, Grade Group: 3) - Signed by Vaughn Barrios MD on 3/6/2025  Prostate specific antigen (PSA) range: 10 to 19  Christine primary pattern: 4  Christine secondary pattern: 3  Hamilton score: 7  Histologic grading system: 5 grade system  Stage used in treatment planning: Yes  National guidelines used in treatment planning: Yes  Type of national guideline used in treatment planning: NCCN  Oncology History   Personal history of malignant melanoma   1/14/2014 Surgery    Wide excision melanoma left shoulder with SLN biopsy  Scar and previous procedure site changes  SLN biopsy negative  Stage IIA  T3a NO MX       Prostate cancer ([facility]ly [facility])   2/4/2025 Initial Diagnosis    Prostate cancer ([facility])     2/4/2025 Biopsy    Prostate, left, core needle biopsy:  -Benign prostatic tissue, negative for carcinoma.       B. Prostate, right, core needle biopsy:  -Prostatic adenocarcinoma, Christine score 4+3=7, Grade group 3 (90% pattern 4) involving 3 of 8 cores (85%, 50%, 15%).  -Perineural invasion present.  -NKX3.1 is positive in tumor.      3/6/2025 -  Cancer Staged    Staging form: Prostate, AJCC 8th Edition  - Clinical stage from 3/6/2025: Stage IIC (cT2a, cN0, cM0, PSA: 17.2, Grade Group: 3) - Signed by Vaughn Barrios MD on 3/6/2025  Prostate specific antigen (PSA) range: 10 to 19  Hamilton primary pattern: 4  Hamilton secondary pattern: 3  Hamilton score: 7  Histologic grading system: 5 grade system  Stage used in treatment planning: Yes  National guidelines used in  treatment planning: Yes  Type of national guideline used in treatment planning: NCCN          Past Medical History:   Diagnosis Date    BPH (benign prostatic hyperplasia)     Disease of thyroid gland     GERD (gastroesophageal reflux disease)     PVC's (premature ventricular contractions)      Past Surgical History:   Procedure Laterality Date    EYE SURGERY      HERNIA REPAIR      ROTATOR CUFF REPAIR      US GUIDED PROSTATE BIOPSY  2025     History reviewed. No pertinent family history.  Social History     Tobacco Use    Smoking status: Former     Current packs/day: 0.00     Average packs/day: 0.8 packs/day for 14.0 years (10.5 ttl pk-yrs)     Types: Cigarettes     Start date:      Quit date:      Years since quittin.2    Smokeless tobacco: Never   Vaping Use    Vaping status: Never Used   Substance and Sexual Activity    Alcohol use: Yes    Drug use: No    Sexual activity: Yes     Current Outpatient Medications on File Prior to Visit   Medication Sig Dispense Refill    aspirin (ECOTRIN LOW STRENGTH) 81 mg EC tablet Take 1 tablet by mouth daily      Cholecalciferol 50 MCG (2000 UT) CAPS Take 1 capsule by mouth daily Pt reported taking every other day      famotidine (PEPCID) 20 mg tablet Take 20 mg by mouth 2 (two) times a day      levothyroxine 75 mcg tablet Take 1 tablet by mouth daily      tamsulosin (FLOMAX) 0.4 mg Take 0.8 mg by mouth daily with dinner      methylPREDNISolone 4 MG tablet therapy pack Use as directed on package (Patient not taking: Reported on 3/7/2025) 1 each 0     No current facility-administered medications on file prior to visit.     Allergies   Allergen Reactions    Clarithromycin Other (See Comments)     biaxin         Objective   /72 (BP Location: Left arm)   Pulse 57   Temp 97.8 °F (36.6 °C) (Temporal)   Resp 17   Ht 6' (1.829 m)   Wt 97.5 kg (215 lb)   SpO2 96%   BMI 29.16 kg/m²     Pain Screening:  Pain Score: 0-No pain   ECOG ECOG Performance Status: 1 -  Restricted in physically strenuous activity but ambulatory and able to carry out work of a light or sedentary nature, e.g., light house work, office work  Physical Exam  Vitals and nursing note reviewed.   Constitutional:       General: He is not in acute distress.     Appearance: He is not ill-appearing or toxic-appearing.   HENT:      Head: Normocephalic and atraumatic.      Right Ear: External ear normal.      Left Ear: External ear normal.      Nose: Nose normal.   Eyes:      Conjunctiva/sclera: Conjunctivae normal.   Cardiovascular:      Rate and Rhythm: Normal rate and regular rhythm.      Pulses: Normal pulses.      Heart sounds: Normal heart sounds. No murmur heard.     No friction rub. No gallop.   Pulmonary:      Effort: Pulmonary effort is normal. No respiratory distress.      Breath sounds: Normal breath sounds. No stridor. No wheezing, rhonchi or rales.   Abdominal:      General: Bowel sounds are normal. There is no distension.      Palpations: Abdomen is soft. There is no mass.      Tenderness: There is no abdominal tenderness. There is no guarding.   Musculoskeletal:      Right lower leg: No edema.      Left lower leg: No edema.   Lymphadenopathy:      Cervical: No cervical adenopathy.   Skin:     General: Skin is warm and dry.      Capillary Refill: Capillary refill takes less than 2 seconds.   Neurological:      General: No focal deficit present.      Mental Status: He is alert.      Cranial Nerves: No cranial nerve deficit.   Psychiatric:         Mood and Affect: Mood normal.         Behavior: Behavior normal.         Thought Content: Thought content normal.         Judgment: Judgment normal.          Lab Results   Component Value Date    PSA 17.210 (H) 10/14/2024    PSA 12.62 (H) 04/17/2024    PSA 11.99 (H) 01/03/2024    PSA 11.62 (H) 10/26/2023    PSA 8.6 (H) 04/18/2023    PSA 8.2 (H) 10/12/2022    PSA 6.9 (H) 09/15/2020    PSA 6.6 (H) 05/11/2020    PSA 5.6 (H) 03/11/2020    PSA 5.9 (H)  05/30/2019    PSA 5.90 (H) 05/29/2018       Radiology Review:   Entry Date and Time Status Entered by   2/19/2025  6:43 PM Final result Interface, Radiology Results In     Narrative & Impression    PROSTATE ULTRASOUND AND ULTRASOUND GUIDANCE FOR BIOPSY.     INDICATION:   R97.20: Elevated prostate specific antigen (PSA).     FINDINGS:     Transrectal ultrasound of the prostate was performed in coronal and sagittal planes with standard static and volumetric imaging.     The seminal vesicles are symmetric and normal in appearance.  Prostate size is 6.8 x 5.9 x 4.9 cm = 102.1 cc.  Given a PSA of 17.21  the calculated PSA density is 0.17 .     No suspicious solid lesions or areas of decreased echogenicity are noted within the peripheral zone. Prostate margins are intact.     Real-time ultrasound guidance was provided for core biopsy performed by Dr. Jha.     IMPRESSION:     Prostate is enlarged.  No suspicious mass lesions are sonographically evident.           Workstation performed: [id number]         Entry Date and Time Status Entered by   2/25/2025  4:21 PM Final result Interface, Radiology Results In     Narrative & Impression    CT ABDOMEN AND PELVIS WITH IV CONTRAST     INDICATION: C61: Malignant neoplasm of prostate.     Prostate biopsy 2/4/2025 demonstrated prostate adenocarcinoma.     COMPARISON: CTA chest 12/2/2020     TECHNIQUE: CT examination of the abdomen and pelvis was performed. Multiplanar 2D reformatted images were created from the source data.     This examination, like all CT scans performed in the [facility], was performed utilizing techniques to minimize radiation dose exposure, including the use of iterative reconstruction and automated exposure control. Radiation dose length   product (DLP) for this visit: 872.83 mGy-cm     IV Contrast: 100 mL of iohexol  Enteric Contrast: Administered.     FINDINGS:     ABDOMEN     LOWER CHEST: Bibasilar atelectasis. Chronic left hemidiaphragm  elevation. Otherwise no clinically significant abnormality in the visualized lower chest.     LIVER/BILIARY TREE: Subcentimeter hypoattenuating lesion(s), too small to characterize but statistically likely benign, which do not require follow-up (ACR White Paper 2017). No suspicious mass. Normal hepatic contours. Mild central intrahepatic biliary   ductal dilation grossly stable from 12/2/2020.     GALLBLADDER: No calcified gallstones. No pericholecystic inflammatory change.     SPLEEN: Unremarkable.     PANCREAS: A simple cystic lesion in the pancreatic body measuring 1.6 cm (series 2 image 41), stable from 12/2/2020. No solid components or main pancreatic ductal dilation.     ADRENAL GLANDS: Unremarkable.     KIDNEYS/URETERS: Numerous bilateral simple renal cyst(s), the largest measuring 5.1 cm at the right upper pole. Subcentimeter hypoattenuating renal lesions, too small to characterize but statistically likely to be benign, which do not warrant follow-up   (Radiology June 2019). Nonobstructing calculus at the left upper pole measuring 3 mm. No hydronephrosis.     STOMACH AND BOWEL: Colonic diverticulosis without findings of acute diverticulitis.     APPENDIX: Normal.     ABDOMINOPELVIC CAVITY: Hypodense area adjacent to the deep inguinal ring measuring 2.8 x 1.8 cm with a central curvilinear hyperattenuating focus is most likely related to prior inguinal hernia repair. No pneumoperitoneum. No lymphadenopathy. Mildly   prominent left pelvic sidewall node measuring 9 mm (series 2 image 138) with fatty hilum is not enlarged by size criteria.     VESSELS: Fusiform infrarenal abdominal aortic aneurysm measuring 3.2 cm. There appears to be some mild associated eccentric mural thrombus. Atherosclerosis.     PELVIS     REPRODUCTIVE ORGANS: Enlarged prostate.     URINARY BLADDER: Unremarkable.     ABDOMINAL WALL/INGUINAL REGIONS: Prior right inguinal hernia repair.     BONES: No acute fracture or suspicious osseous  lesion. Spinal degenerative changes.     IMPRESSION:     1. No findings of metastatic disease in the abdomen or pelvis.     2. A simple cystic lesion in the pancreatic body measuring 1.6 cm is stable from 12/2/2020.  For simple cyst(s) less than 1.5 cm, recommend follow-up every 2 years for 2 times. After 4 years, no more follow-ups. Recommend next follow-up in 2 years. Preferred imaging modality: abdomen MRI and MRCP with and without IV contrast, or triple phase   abdomen CT with IV contrast, or abdomen MRI and MRCP without IV contrast.     3. Fusiform infrarenal abdominal aortic aneurysm measuring 3.2 cm.  According to ACR white paper for the management of incidentally detected abdominal vascular findings, for an aorta of this caliber (3.0-3.4 cm) follow-up imaging (e.g. Doppler ultrasound, CTA abdomen/pelvis) is recommended at a 3 year interval.   Reference: J Am Kelvin Radiol 2013;10:789-794.        The study was marked in EPIC for significant notification.     Resident: Levi Belcher     I, the attending radiologist, have reviewed the images and agree with the final report above.     Workstation performed: [id number]         Entry Date and Time Status Entered by   2/20/2025  3:49 PM Final result Interface, Radiology Results In     Result Text    BONE SCAN  WHOLE BODY     INDICATION: C61: Malignant neoplasm of prostate     COMPARISON: Same day CT abdomen pelvis. No prior bone scans for comparison     TECHNIQUE:   This study was performed following the intravenous administration of 27.5 mCi Tc-99m labeled MDP.  Delayed, anterior and posterior whole body images were acquired, 2-3 hours after radiopharmaceutical administration.     FINDINGS:     There is no scintigraphic evidence of osseous metastasis. Mild foci of activity in the lower lumbar spine likely corresponds to degenerative changes on the CT study. Additional foci of activity in the right shoulder, C spine  and left foot are also   likely due to  degenerative changes.     Symmetric renal uptake.        IMPRESSION:     No scintigraphic evidence of osseous metastasis.           Resident: Jon Mosqueda I, the attending radiologist, have reviewed the images and agree with the final report above.     Workstation performed: [id number]           Pathology Review:  Case Report   Surgical Pathology Report                         Case: [id number]                                   Authorizing Provider:  Marquise Jha DO         Collected:           02/04/2025 1112               Ordering Location:     [facility]        Received:            02/04/2025 1218                                      [facility]                                                         Pathologist:           Kumar Teague DO                                                             Specimens:   A) - Prostate, Left                                                                                  B) - Prostate, Right                                                                      Final Diagnosis   A. Prostate, left, core needle biopsy:  -Benign prostatic tissue, negative for carcinoma.       B. Prostate, right, core needle biopsy:  -Prostatic adenocarcinoma, [place] score 4+3=7, Grade group 3 (90% pattern 4) involving 3 of 8 cores (85%, 50%, 15%).  -Perineural invasion present.  -NKX3.1 is positive in tumor.    Electronically signed by Kumar Teague DO on 2/6/2025 at 1047 EST   Note    BEST BLOCK FOR ANCILLARY STUDIES: B5  Intradepartmental consultation is in agreement.   Additional Information    All reported additional testing was performed with appropriately reactive controls.  These tests were developed and their performance characteristics determined by [facility] or appropriate performing facility, though some tests may be performed on tissues which have not been validated for performance characteristics (such as staining performed on  "alcohol exposed cell blocks and decalcified tissues).  Results should be interpreted with caution and in the context of the patients’ clinical condition. These tests may not be cleared or approved by the U.S. Food and Drug Administration, though the FDA has determined that such clearance or approval is not necessary. These tests are used for clinical purposes and they should not be regarded as investigational or for research. This laboratory has been approved by [facility], designated as a high-complexity laboratory and is qualified to perform these tests.     Interpretation performed at [facility], [address] [facility] [address]   Gross Description    A. The specimen is received in formalin, labeled with the patient's name and hospital number, and is designated \" prostate, left\".  The specimen consists of 8, tan, and friable tissue cores measuring less than 0.1-0.1 cm in diameter and ranging from 0.9-1.7 cm in length.  Entirely submitted between sponges, 8 cassettes.  B. The specimen is received in formalin, labeled with the patient's name and hospital number, and is designated \" right prostate\".  The specimen consists of 8, tan, and friable tissue cores measuring less than 0.1-0.1 cm in diameter and ranging from 1.1-and 2.1 cm in length.  Entirely submitted between sponges, 8 cassettes.     Note: The estimated total formalin fixation time based upon information provided by the submitting clinician and the standard processing schedule is 8.5 hours.  -Leelee Diaz   Clinical Information Elevated PSA   Resulting Agency [facility]             Specimen Collected: 02/04/25 11:12 Last Resulted: 02/06/25 10:47           Administrative Statements   I have spent a total time of 65 minutes in caring for this patient on the day of the visit/encounter including Diagnostic results, Prognosis, Risks and benefits of tx options, Patient and family education, Risk factor reductions, Impressions, Counseling / Coordination " of care, Documenting in the medical record, Reviewing/placing orders in the medical record (including tests, medications, and/or procedures), and Obtaining or reviewing history  .

## 2025-03-07 ENCOUNTER — CONSULT (OUTPATIENT)
Dept: RADIATION ONCOLOGY | Facility: CLINIC | Age: 85
End: 2025-03-07
Attending: STUDENT IN AN ORGANIZED HEALTH CARE EDUCATION/TRAINING PROGRAM
Payer: COMMERCIAL

## 2025-03-07 ENCOUNTER — TELEPHONE (OUTPATIENT)
Age: 85
End: 2025-03-07

## 2025-03-07 VITALS
HEART RATE: 57 BPM | WEIGHT: 215 LBS | RESPIRATION RATE: 17 BRPM | HEIGHT: 72 IN | SYSTOLIC BLOOD PRESSURE: 150 MMHG | DIASTOLIC BLOOD PRESSURE: 72 MMHG | OXYGEN SATURATION: 96 % | TEMPERATURE: 97.8 F | BODY MASS INDEX: 29.12 KG/M2

## 2025-03-07 DIAGNOSIS — C61 PROSTATE CANCER (HCC): Primary | ICD-10-CM

## 2025-03-07 PROCEDURE — 99211 OFF/OP EST MAY X REQ PHY/QHP: CPT | Performed by: STUDENT IN AN ORGANIZED HEALTH CARE EDUCATION/TRAINING PROGRAM

## 2025-03-07 PROCEDURE — 99205 OFFICE O/P NEW HI 60 MIN: CPT | Performed by: STUDENT IN AN ORGANIZED HEALTH CARE EDUCATION/TRAINING PROGRAM

## 2025-03-07 PROCEDURE — G2211 COMPLEX E/M VISIT ADD ON: HCPCS | Performed by: STUDENT IN AN ORGANIZED HEALTH CARE EDUCATION/TRAINING PROGRAM

## 2025-03-07 NOTE — TELEPHONE ENCOUNTER
New Patient      Insurance   Current Insurance? [facility]    Insurance E-verified? yes      History   Reason for appointment/active symptoms?  Prostate Cancer diagnosis. Pt has met with Rad Onc and they recommended Space OAR surgery.      Has the patient had any previous Urologist(s)? Pt was seeing Dr Jha but he does not perform Space OAR surgery.      Was the patient seen in the ED? no     Labs/Imaging(Including Out Of Network)? Imaging/ labs     Records Requested? yes  Records Visible in [facility]? Records from Dr Jha are visible in epic under scanned document dated 3/4/2025      Personal history of cancer? yes     Appointment   Office location preference:  [place] but willing to travel to [place]  ?   Appointment Details   Date: 3/11/25  Time:  1pm  Location: [place]  Provider:  Dr. Mario  Does the appointment need further review?    Please review to confirm pt is scheduled appropriately.

## 2025-03-07 NOTE — PROGRESS NOTES
Henry Hickman 1940 is a 84 y.o. 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 Originally presented Elevated PSA. He is a self referral patient.      PMHx: BPH, PVC, and malignant melanoma Left side shoulder, and leg.        PSA   Latest Ref Rng 0.000 - 4.000 ng/mL   3/11/2020 5.6 (H)    5/11/2020 6.6 (H)    9/15/2020 6.9 (H)    10/12/2022 8.2 (H)    4/18/2023 8.6 (H)    10/26/2023 11.62 (H)    1/3/2024 11.99 (H)    4/17/2024 12.62 (H)    10/14/2024 17.210 (H)       2/4/25 Tissue Exam   Prostate, left, core needle biopsy:  -Benign prostatic tissue, negative for carcinoma.       B. Prostate, right, core needle biopsy:  -Prostatic adenocarcinoma, Hamilton score 4+3=7, Grade group 3 (90% pattern 4) involving 3 of 8 cores (85%, 50%, 15%).  -Perineural invasion present.  -NKX3.1 is positive in tumor.     2/12/25 Abhijeet Camarillo  Small amount of urine noted since biopsy. BPH patient on Flomax. Bone Scan and PET CT ordered. Bladder had 44 mL.     2/20/25 CT CAP  IMPRESSION:     1. No findings of metastatic disease in the abdomen or pelvis.     2. A simple cystic lesion in the pancreatic body measuring 1.6 cm is stable from 12/2/2020.  For simple cyst(s) less than 1.5 cm, recommend follow-up every 2 years for 2 times. After 4 years, no more follow-ups. Recommend next follow-up in 2 years. Preferred imaging modality: abdomen MRI and MRCP with and without IV contrast, or triple phase   abdomen CT with IV contrast, or abdomen MRI and MRCP without IV contrast.     3. Fusiform infrarenal abdominal aortic aneurysm measuring 3.2 cm.  According to ACR white paper for the management of incidentally detected abdominal vascular findings, for an aorta of this caliber (3.0-3.4 cm) follow-up imaging (e.g. Doppler ultrasound, CTA abdomen/pelvis) is recommended at a 3 year interval.     2/20/25 NM bone scan   IMPRESSION:  No scintigraphic evidence of osseous metastasis.      2/28/25  Abhijeet Camarillo  Discussed patient's Bone Scan and CT Scan. See Radiation Oncology.                           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 Originally presented Elevated PSA. He is a self referral patient.      PMHx: BPH, PVC, and malignant melanoma.        PSA   Latest Ref Rng 0.000 - 4.000 ng/mL   3/11/2020 5.6 (H)    5/11/2020 6.6 (H)    9/15/2020 6.9 (H)    10/12/2022 8.2 (H)    4/18/2023 8.6 (H)    10/26/2023 11.62 (H)    1/3/2024 11.99 (H)    4/17/2024 12.62 (H)    10/14/2024 17.210 (H)       2/4/25 Tissue Exam   Prostate, left, core needle biopsy:  -Benign prostatic tissue, negative for carcinoma.       B. Prostate, right, core needle biopsy:  -Prostatic adenocarcinoma, [place] score 4+3=7, Grade group 3 (90% pattern 4) involving 3 of 8 cores (85%, 50%, 15%).  -Perineural invasion present.  -NKX3.1 is positive in tumor.     2/20/25 CT CAP  IMPRESSION:     1. No findings of metastatic disease in the abdomen or pelvis.     2. A simple cystic lesion in the pancreatic body measuring 1.6 cm is stable from 12/2/2020.  For simple cyst(s) less than 1.5 cm, recommend follow-up every 2 years for 2 times. After 4 years, no more follow-ups. Recommend next follow-up in 2 years. Preferred imaging modality: abdomen MRI and MRCP with and without IV contrast, or triple phase   abdomen CT with IV contrast, or abdomen MRI and MRCP without IV contrast.     3. Fusiform infrarenal abdominal aortic aneurysm measuring 3.2 cm.  According to ACR white paper for the management of incidentally detected abdominal vascular findings, for an aorta of this caliber (3.0-3.4 cm) follow-up imaging (e.g. Doppler ultrasound, CTA abdomen/pelvis) is recommended at a 3 year interval.     2/20/25 NM bone scan   IMPRESSION:  No scintigraphic evidence of osseous metastasis.    2/12/25 Abhijeet Camarillo  Small amount of urine noted since biopsy. BPH patient on Flomax.  Bone Scan and PET CT ordered. Bladder had 44 mL.     2/28/25 Abhijeet Camarillo  Discussed patient's Bone Scan and CT Scan. See Radiation Oncology.                            Personal history of malignant melanoma   1/14/2014 Surgery    Wide excision melanoma left shoulder with SLN biopsy  Scar and previous procedure site changes  SLN biopsy negative  Stage IIA  T3a NO MX       Prostate cancer (HCC)   2/4/2025 Initial Diagnosis    Prostate cancer (HCC)     2/4/2025 Biopsy    Prostate, left, core needle biopsy:  -Benign prostatic tissue, negative for carcinoma.       B. Prostate, right, core needle biopsy:  -Prostatic adenocarcinoma, [place] score 4+3=7, Grade group 3 (90% pattern 4) involving 3 of 8 cores (85%, 50%, 15%).  -Perineural invasion present.  -NKX3.1 is positive in tumor.      3/6/2025 -  Cancer Staged    Staging form: Prostate, AJCC 8th Edition  - Clinical stage from 3/6/2025: Stage IIC (cT2a, cN0, cM0, PSA: 17.2, Grade Group: 3) - Signed by Vaughn Barrios MD on 3/6/2025  Prostate specific antigen (PSA) range: 10 to 19  Hamilton primary pattern: 4  [place] secondary pattern: 3  Hamilton score: 7  Histologic grading system: 5 grade system  Stage used in treatment planning: Yes  National guidelines used in treatment planning: Yes  Type of national guideline used in treatment planning: NCCN           Review of Systems:  Review of Systems   Constitutional:  Positive for fatigue. Negative for activity change and appetite change.   HENT: Negative.     Eyes: Negative.    Respiratory: Negative.     Cardiovascular: Negative.    Gastrointestinal:  Negative for blood in stool, constipation, diarrhea, nausea and vomiting.   Endocrine: Negative.    Genitourinary:  Positive for difficulty urinating. Negative for dysuria, frequency, hematuria and urgency.   Musculoskeletal: Negative.    Skin: Negative.    Allergic/Immunologic: Negative.    Neurological: Negative.    Hematological:  Bruises/bleeds easily.    Psychiatric/Behavioral: Negative.         Clinical Trial: no    IPSS Questionnaire (AUA-7):  Over the past month…    1)  How often have you had a sensation of not emptying your bladder completely after you finish urinating?  2 - Less than half the time   2)  How often have you had to urinate again less than two hours after you finished urinating? 0 - Not at all   3)  How often have you found you stopped and started again several times when you urinated?  5 - Almost always   4) How difficult have you found it to postpone urination?  0 - Not at all   5) How often have you had a weak urinary stream?  5 - Almost always   6) How often have you had to push or strain to begin urination?  3 - About half the time   7) How many times did you most typically get up to urinate from the time you went to bed until the time you got up in the morning?  2 - 2 times   Total Score:  16       Pain assessment: 0    PSA 17.210    Prior Radiation none     Teaching NCL booklet,SIM,Side Effects    MST completed     Implantable Devices (Port, pacemaker, pain stimulator) none     Hip Replacement none     Health Maintenance   Topic Date Due    Medicare Annual Wellness Visit (AWV)  Never done    Depression Screening  Never done    BMI: Followup Plan  Never done    Pneumococcal Vaccine: 65+ Years (1 of 2 - PCV) Never done    Fall Risk  Never done    RSV Vaccine for Pregnant Patients and Patients Age 60+ Years (1 - 1-dose 75+ series) Never done    Influenza Vaccine (1) Never done    COVID-19 Vaccine (4 - 2024-25 season) 09/01/2024    BMI: Adult  06/03/2025    Zoster Vaccine  Completed    Meningococcal B Vaccine  Aged Out    RSV Vaccine age 0-20 Months  Aged Out    HIB Vaccine  Aged Out    IPV Vaccine  Aged Out    Hepatitis A Vaccine  Aged Out    Meningococcal ACWY Vaccine  Aged Out    HPV Vaccine  Aged Out       Past Medical History:   Diagnosis Date    BPH (benign prostatic hyperplasia)     Disease of thyroid gland     GERD (gastroesophageal  reflux disease)     PVC's (premature ventricular contractions)        Past Surgical History:   Procedure Laterality Date    EYE SURGERY      HERNIA REPAIR      ROTATOR CUFF REPAIR      US GUIDED PROSTATE BIOPSY  2025       No family history on file.    Social History     Tobacco Use    Smoking status: Former     Current packs/day: 0.00     Average packs/day: 0.8 packs/day for 14.0 years (10.5 ttl pk-yrs)     Types: Cigarettes     Start date:      Quit date: 1970     Years since quittin.2    Smokeless tobacco: Never   Vaping Use    Vaping status: Never Used   Substance Use Topics    Alcohol use: Yes    Drug use: No          Current Outpatient Medications:     aspirin (ECOTRIN LOW STRENGTH) 81 mg EC tablet, Take 1 tablet by mouth daily, Disp: , Rfl:     Cholecalciferol 50 MCG ( UT) CAPS, Take 1 capsule by mouth daily Pt reported taking every other day, Disp: , Rfl:     famotidine (PEPCID) 20 mg tablet, Take 20 mg by mouth 2 (two) times a day, Disp: , Rfl:     levothyroxine 75 mcg tablet, Take 1 tablet by mouth daily, Disp: , Rfl:     methylPREDNISolone 4 MG tablet therapy pack, Use as directed on package, Disp: 1 each, Rfl: 0    tamsulosin (FLOMAX) 0.4 mg, Take 0.8 mg by mouth daily with dinner, Disp: , Rfl:     Allergies   Allergen Reactions    Clarithromycin Other (See Comments)     biaxin        There were no vitals filed for this visit.

## 2025-03-11 ENCOUNTER — OFFICE VISIT (OUTPATIENT)
Dept: UROLOGY | Facility: MEDICAL CENTER | Age: 85
End: 2025-03-11
Payer: COMMERCIAL

## 2025-03-11 VITALS
OXYGEN SATURATION: 96 % | HEIGHT: 72 IN | BODY MASS INDEX: 29.53 KG/M2 | WEIGHT: 218 LBS | SYSTOLIC BLOOD PRESSURE: 110 MMHG | DIASTOLIC BLOOD PRESSURE: 60 MMHG | HEART RATE: 60 BPM | RESPIRATION RATE: 21 BRPM

## 2025-03-11 DIAGNOSIS — C61 PROSTATE CANCER (HCC): Primary | ICD-10-CM

## 2025-03-11 DIAGNOSIS — N18.31 CHRONIC KIDNEY DISEASE, STAGE 3A (HCC): ICD-10-CM

## 2025-03-11 LAB
SL AMB  POCT GLUCOSE, UA: ABNORMAL
SL AMB LEUKOCYTE ESTERASE,UA: ABNORMAL
SL AMB POCT BILIRUBIN,UA: ABNORMAL
SL AMB POCT BLOOD,UA: ABNORMAL
SL AMB POCT CLARITY,UA: CLEAR
SL AMB POCT COLOR,UA: YELLOW
SL AMB POCT KETONES,UA: ABNORMAL
SL AMB POCT NITRITE,UA: ABNORMAL
SL AMB POCT PH,UA: 6
SL AMB POCT SPECIFIC GRAVITY,UA: 1.01
SL AMB POCT URINE PROTEIN: ABNORMAL
SL AMB POCT UROBILINOGEN: 0.2

## 2025-03-11 PROCEDURE — 99204 OFFICE O/P NEW MOD 45 MIN: CPT | Performed by: UROLOGY

## 2025-03-11 PROCEDURE — 81003 URINALYSIS AUTO W/O SCOPE: CPT | Performed by: UROLOGY

## 2025-03-11 PROCEDURE — 96402 CHEMO HORMON ANTINEOPL SQ/IM: CPT

## 2025-03-11 RX ORDER — DUTASTERIDE 0.5 MG/1
1 CAPSULE, LIQUID FILLED ORAL DAILY
COMMUNITY
Start: 2025-03-02

## 2025-03-11 NOTE — PATIENT INSTRUCTIONS
Patient Education     Hormone Therapy for Prostate Cancer   About this topic   Cancer happens when normal cells in the body grow out of control. The prostate is a part of the body that helps make semen. The prostate is found at the base of the penis and in front of the rectum. Prostate cancer starts in the tissue of your prostate. Prostate cancer is treated in a number of ways depending on the type and stage of prostate cancer you have. You may need surgery, radiation, or drugs, or a combination of these. Hormone therapy is one type of drug your doctor may order.  Men and women make hormones called androgens. Androgens are the primary reproductive and growth hormones in men and cause the development of a deep voice, facial hair, and other traits. Testosterone is an androgen. Testosterone is a hormone that is responsible for the growth of prostate tissue.  General   Testosterone makes prostate cancer cells grow. The goal is to lower the amount of testosterone. Your doctor may suggest one of these ways.  Surgery to take out the testicles, called an orchiectomy. This is surgical castration.  Drugs to stop the body from making testosterone. This is antiandrogen therapy or chemical castration.  Combined androgen blockage ? Uses both surgery and drugs to stop your body from making testosterone.  Estrogen ? Uses another hormone to mask the testosterone that is in the body. Then, your body thinks there is enough testosterone, so your body does not make more. Estrogen is used less often due to side effects.  Advantages of hormone therapy:  Slows the growth of cancer cells  Eases signs like pain and problems passing urine  Less side effects and less chance of problems than some other treatments  Reduce the size of the cancer before radiation treatment.  Disadvantages of hormone therapy:  Is not a cure for prostate cancer  Only a short-term way to ease signs  Your cancer cells may stop responding to hormone therapy  When  this happens, your cancer may become metastatic or spread to other areas.  May cause side effects such as:  Erection problems, impotence, or loss of desire for sex  Shrinkage of penis and testicles  Hot flashes and too much sweating  Breast pain  Enlargement of breasts called gynecomastia  Loss of muscle and increase of body fat  Increased risk of type 2 diabetes  Tumor pain  Weight gain  Weak bones and osteoporisis  More risk for heart problems  Anemia  Hair loss  Memory problems  Loose stools  Upset stomach  Itching  Low mood  Feeling tired     What will the results be?   Hormone therapy is used to stop the body from making testosterone. When testosterone is lowered, the cancer cells either die or grow more slowly.  What lifestyle changes are needed?   You may need treatment for the rest of your life.  The side effects of hormone therapy might affect how you live.  You may have problems with sexual intercourse. You can try other ways to please yourself and your partner.  What drugs may be needed?   The doctor may order drugs to:  Stop your body from making testosterone  Help with side effects like mood changes, upset stomach, throwing up, and low iron  When do I need to call the doctor?   Upset stomach and throwing up are not relieved by the drugs you are given  You have concerns about how you are feeling  Last Reviewed Date   2024-03-22  Consumer Information Use and Disclaimer   This generalized information is a limited summary of diagnosis, treatment, and/or medication information. It is not meant to be comprehensive and should be used as a tool to help the user understand and/or assess potential diagnostic and treatment options. It does NOT include all information about conditions, treatments, medications, side effects, or risks that may apply to a specific patient. It is not intended to be medical advice or a substitute for the medical advice, diagnosis, or treatment of a health care provider based on the  health care provider's examination and assessment of a patient’s specific and unique circumstances. Patients must speak with a health care provider for complete information about their health, medical questions, and treatment options, including any risks or benefits regarding use of medications. This information does not endorse any treatments or medications as safe, effective, or approved for treating a specific patient. Up[employer]Date, Inc[employer] and its affiliates disclaim any warranty or liability relating to this information or the use thereof. The use of this information is governed by the Terms of Use, available at https://www.woltersMydeouwer.com/en/know/clinical-effectiveness-terms   Copyright   Copyright © 2024 UpToDate, Inc. and its affiliates and/or licensors. All rights reserved.

## 2025-03-11 NOTE — ASSESSMENT & PLAN NOTE
He has unfavorable risk intermediate prostate cancer and will be having radiation therapy.  The pros and cons as well as risks of hormonal therapy were discussed.  We also discussed placement of SpaceOAR and fiducial markers.  Orders:    Ambulatory Referral to Urology    POCT urine dip auto non-scope    PSA Total, Diagnostic; Future    leuprolide (ELIGARD 6 MONTH KIT) subcutaneous injection 45 mg

## 2025-03-11 NOTE — PROGRESS NOTES
Name: Henry Hickman      : 1940      MRN: [id number]  Encounter Provider: Jeff Mario MD  Encounter Date: 3/11/2025   Encounter department: [facility] UROLOGY [facility]  :  Assessment & Plan  Prostate cancer (HCC)  He has unfavorable risk intermediate prostate cancer and will be having radiation therapy.  The pros and cons as well as risks of hormonal therapy were discussed.  We also discussed placement of SpaceOAR and fiducial markers.  Orders:    Ambulatory Referral to Urology    POCT urine dip auto non-scope    PSA Total, Diagnostic; Future    leuprolide (ELIGARD 6 MONTH KIT) subcutaneous injection 45 mg    Chronic kidney disease, stage 3a (HCC)  Lab Results   Component Value Date    EGFR 43 2025    EGFR 42 2024    EGFR 44 2024    CREATININE 1.47 (H) 2025    CREATININE 1.50 (H) 2024    CREATININE 1.45 (H) 2024                History of Present Illness   Henry Hickman is a 84 y.o. male who presents for treatment of  prostate cancer.  He recently underwent evaluation by Dr. Jha for an elevated PSA of 17.21.  He had a prostate biopsy that was positive for Hamilton 4+3 equal 7 prostate cancer.  Bone scan and CAT scan were negative.  He is already seen radiation oncology and was referred here for androgen deprivation therapy and discussion regarding SpaceOAR and fiducial marker placement.  AUA SYMPTOM SCORE      Flowsheet Row Most Recent Value   AUA SYMPTOM SCORE    How often have you had a sensation of not emptying your bladder completely after you finished urinating? 2 (P)     How often have you had to urinate again less than two hours after you finished urinating? 0 (P)     How often have you found you stopped and started again several times when you urinate? 2 (P)     How often have you found it difficult to postpone urination? 0 (P)     How often have you had a weak urinary stream? 5 (P)     How often have you had to push or strain to begin  "urination? 5 (P)     How many times did you most typically get up to urinate from the time you went to bed at night until the time you got up in the morning? 2 (P)     Quality of Life: If you were to spend the rest of your life with your urinary condition just the way it is now, how would you feel about that? 5 (P)     AUA SYMPTOM SCORE 16 (P)            Review of Systems   Constitutional:  Negative for chills, diaphoresis, fatigue and fever.   HENT: Negative.     Eyes: Negative.    Respiratory: Negative.     Cardiovascular: Negative.    Endocrine: Negative.    Genitourinary:         See HPI   Musculoskeletal: Negative.    Skin: Negative.    Allergic/Immunologic: Negative.    Neurological: Negative.    Hematological: Negative.    Psychiatric/Behavioral: Negative.            Objective   /60 (BP Location: Left arm, Patient Position: Sitting, Cuff Size: Large)   Pulse 60   Resp 21   Ht 5' 11.5\" (1.816 m)   Wt 98.9 kg (218 lb)   SpO2 96%   BMI 29.98 kg/m²     Physical Exam  Vitals reviewed.   Constitutional:       General: He is not in acute distress.     Appearance: Normal appearance. He is well-developed and normal weight. He is not ill-appearing, toxic-appearing or diaphoretic.   HENT:      Head: Normocephalic and atraumatic.   Eyes:      General: No scleral icterus.     Conjunctiva/sclera: Conjunctivae normal.   Cardiovascular:      Rate and Rhythm: Normal rate.   Pulmonary:      Effort: Pulmonary effort is normal.   Abdominal:      General: Abdomen is flat. There is no distension.      Palpations: There is no mass.      Tenderness: There is no abdominal tenderness. There is no right CVA tenderness, left CVA tenderness, guarding or rebound.      Hernia: No hernia is present.   Genitourinary:     Penis: Normal.       Testes: Normal.   Musculoskeletal:      Cervical back: Neck supple.   Skin:     General: Skin is warm and dry.   Neurological:      General: No focal deficit present.      Mental Status: He " is alert and oriented to person, place, and time.   Psychiatric:         Mood and Affect: Mood normal.         Behavior: Behavior normal.         Thought Content: Thought content normal.         Judgment: Judgment normal.         Results   Lab Results   Component Value Date    PSA 17.210 (H) 10/14/2024    PSA 12.62 (H) 04/17/2024    PSA 11.99 (H) 01/03/2024     Lab Results   Component Value Date    GLUCOSE 89 07/05/2022    CALCIUM 8.8 02/12/2025     12/31/2013    K 4.1 02/12/2025    CO2 29 02/12/2025     02/12/2025    BUN 24 02/12/2025    CREATININE 1.47 (H) 02/12/2025     Lab Results   Component Value Date    WBC 3.58 (L) 11/12/2024    HGB 14.1 11/12/2024    HCT 43.9 11/12/2024    MCV 94 11/12/2024     11/12/2024       Office Urine Dip  No results found for this or any previous visit (from the past hour).

## 2025-03-11 NOTE — ASSESSMENT & PLAN NOTE
Lab Results   Component Value Date    EGFR 43 02/12/2025    EGFR 42 11/12/2024    EGFR 44 05/09/2024    CREATININE 1.47 (H) 02/12/2025    CREATININE 1.50 (H) 11/12/2024    CREATININE 1.45 (H) 05/09/2024

## 2025-03-17 ENCOUNTER — PREP FOR PROCEDURE (OUTPATIENT)
Dept: UROLOGY | Facility: MEDICAL CENTER | Age: 85
End: 2025-03-17

## 2025-03-17 ENCOUNTER — TELEPHONE (OUTPATIENT)
Dept: RADIATION ONCOLOGY | Facility: CLINIC | Age: 85
End: 2025-03-17

## 2025-03-17 ENCOUNTER — TELEPHONE (OUTPATIENT)
Age: 85
End: 2025-03-17

## 2025-03-17 DIAGNOSIS — C61 PROSTATE CANCER (HCC): Primary | ICD-10-CM

## 2025-03-17 NOTE — TELEPHONE ENCOUNTER
Spoke with patient and confirmed surgery date of: 6/4/25  Type of surgery: spaceoar/marker  Operating physician: Dr. Khan  Location of surgery: [facility]     Verbally went over prep with patient's daughter in law Fco on: 3/17/25  NPO   Bowel prep? Yes, 1 enema 1 hour prior to leaving the house for the procedure   Hospital calls afternoon prior with arrival time -Calls Friday afternoon for Monday surgeries   Patient needs ride to and from surgery (outpatient)    Pre-op testing to be done 2 weeks prior to surgery  cbc,cmp,u/c,ekg    Blood thinners: Aspirin 81 mg    Clearances needed: none     Mailed packet on: 3/18/25  Copy of packet scanned into Media on: 3/18/25  Labs in packet   Soap instructions in packet    Post-op in packet   H&P on admit   PO         Consent: On admit

## 2025-03-17 NOTE — TELEPHONE ENCOUNTER
SpaceOAR/Fiducial Marker    SpaceOAR Needed: Yes  Fiducial Markers Needed: Yes  ADT Needed: Yes Short term  Treatment Location:  [facility]   Patient received Lupron 45mg on 3/11/25 when he saw Dr. Mario

## 2025-03-26 ENCOUNTER — OFFICE VISIT (OUTPATIENT)
Dept: URGENT CARE | Facility: CLINIC | Age: 85
End: 2025-03-26
Payer: COMMERCIAL

## 2025-03-26 ENCOUNTER — DOCUMENTATION (OUTPATIENT)
Dept: HEMATOLOGY ONCOLOGY | Facility: CLINIC | Age: 85
End: 2025-03-26

## 2025-03-26 ENCOUNTER — PATIENT OUTREACH (OUTPATIENT)
Dept: HEMATOLOGY ONCOLOGY | Facility: CLINIC | Age: 85
End: 2025-03-26

## 2025-03-26 ENCOUNTER — APPOINTMENT (OUTPATIENT)
Dept: LAB | Facility: HOSPITAL | Age: 85
End: 2025-03-26
Attending: INTERNAL MEDICINE
Payer: COMMERCIAL

## 2025-03-26 VITALS
DIASTOLIC BLOOD PRESSURE: 73 MMHG | SYSTOLIC BLOOD PRESSURE: 122 MMHG | RESPIRATION RATE: 18 BRPM | HEART RATE: 63 BPM | TEMPERATURE: 97.9 F | OXYGEN SATURATION: 95 %

## 2025-03-26 DIAGNOSIS — R39.15 URGENCY OF URINATION: Primary | ICD-10-CM

## 2025-03-26 DIAGNOSIS — R73.9 HYPERGLYCEMIA: ICD-10-CM

## 2025-03-26 DIAGNOSIS — R30.0 DYSURIA: Primary | ICD-10-CM

## 2025-03-26 LAB
25(OH)D3 SERPL-MCNC: 50.2 NG/ML (ref 30–100)
ALBUMIN SERPL BCG-MCNC: 4 G/DL (ref 3.5–5)
ALP SERPL-CCNC: 61 U/L (ref 34–104)
ALT SERPL W P-5'-P-CCNC: 14 U/L (ref 7–52)
ANION GAP SERPL CALCULATED.3IONS-SCNC: 4 MMOL/L (ref 4–13)
AST SERPL W P-5'-P-CCNC: 16 U/L (ref 13–39)
BILIRUB SERPL-MCNC: 0.62 MG/DL (ref 0.2–1)
BUN SERPL-MCNC: 31 MG/DL (ref 5–25)
CALCIUM SERPL-MCNC: 8.7 MG/DL (ref 8.4–10.2)
CHLORIDE SERPL-SCNC: 104 MMOL/L (ref 96–108)
CHOLEST SERPL-MCNC: 156 MG/DL (ref ?–200)
CO2 SERPL-SCNC: 31 MMOL/L (ref 21–32)
CREAT SERPL-MCNC: 1.37 MG/DL (ref 0.6–1.3)
ERYTHROCYTE [DISTWIDTH] IN BLOOD BY AUTOMATED COUNT: 12.6 % (ref 11.6–15.1)
EST. AVERAGE GLUCOSE BLD GHB EST-MCNC: 111 MG/DL
GFR SERPL CREATININE-BSD FRML MDRD: 47 ML/MIN/1.73SQ M
GLUCOSE P FAST SERPL-MCNC: 86 MG/DL (ref 65–99)
HBA1C MFR BLD: 5.5 %
HCT VFR BLD AUTO: 42.5 % (ref 36.5–49.3)
HDLC SERPL-MCNC: 45 MG/DL
HGB BLD-MCNC: 13.8 G/DL (ref 12–17)
LDLC SERPL CALC-MCNC: 96 MG/DL (ref 0–100)
MCH RBC QN AUTO: 30.4 PG (ref 26.8–34.3)
MCHC RBC AUTO-ENTMCNC: 32.5 G/DL (ref 31.4–37.4)
MCV RBC AUTO: 94 FL (ref 82–98)
NONHDLC SERPL-MCNC: 111 MG/DL
PLATELET # BLD AUTO: 161 THOUSANDS/UL (ref 149–390)
PMV BLD AUTO: 10.4 FL (ref 8.9–12.7)
POTASSIUM SERPL-SCNC: 4.4 MMOL/L (ref 3.5–5.3)
PROT SERPL-MCNC: 6.6 G/DL (ref 6.4–8.4)
RBC # BLD AUTO: 4.54 MILLION/UL (ref 3.88–5.62)
SL AMB  POCT GLUCOSE, UA: ABNORMAL
SL AMB LEUKOCYTE ESTERASE,UA: ABNORMAL
SL AMB POCT BILIRUBIN,UA: ABNORMAL
SL AMB POCT BLOOD,UA: ABNORMAL
SL AMB POCT CLARITY,UA: CLEAR
SL AMB POCT COLOR,UA: YELLOW
SL AMB POCT KETONES,UA: ABNORMAL
SL AMB POCT NITRITE,UA: ABNORMAL
SL AMB POCT PH,UA: 6.5
SL AMB POCT SPECIFIC GRAVITY,UA: 1.01
SL AMB POCT URINE PROTEIN: ABNORMAL
SL AMB POCT UROBILINOGEN: 0.2
SODIUM SERPL-SCNC: 139 MMOL/L (ref 135–147)
T4 FREE SERPL-MCNC: 1.16 NG/DL (ref 0.61–1.12)
TRIGL SERPL-MCNC: 76 MG/DL (ref ?–150)
TSH SERPL DL<=0.05 MIU/L-ACNC: 1 UIU/ML (ref 0.45–4.5)
WBC # BLD AUTO: 4.22 THOUSAND/UL (ref 4.31–10.16)

## 2025-03-26 PROCEDURE — 81002 URINALYSIS NONAUTO W/O SCOPE: CPT

## 2025-03-26 PROCEDURE — 83036 HEMOGLOBIN GLYCOSYLATED A1C: CPT

## 2025-03-26 PROCEDURE — S9083 URGENT CARE CENTER GLOBAL: HCPCS

## 2025-03-26 PROCEDURE — 82306 VITAMIN D 25 HYDROXY: CPT

## 2025-03-26 PROCEDURE — 84439 ASSAY OF FREE THYROXINE: CPT

## 2025-03-26 PROCEDURE — 85027 COMPLETE CBC AUTOMATED: CPT

## 2025-03-26 PROCEDURE — 99213 OFFICE O/P EST LOW 20 MIN: CPT

## 2025-03-26 PROCEDURE — 80061 LIPID PANEL: CPT

## 2025-03-26 PROCEDURE — 36415 COLL VENOUS BLD VENIPUNCTURE: CPT

## 2025-03-26 PROCEDURE — 87086 URINE CULTURE/COLONY COUNT: CPT

## 2025-03-26 PROCEDURE — 80053 COMPREHEN METABOLIC PANEL: CPT

## 2025-03-26 PROCEDURE — 84443 ASSAY THYROID STIM HORMONE: CPT

## 2025-03-26 RX ORDER — CEPHALEXIN 500 MG/1
500 CAPSULE ORAL EVERY 6 HOURS SCHEDULED
Qty: 28 CAPSULE | Refills: 0 | Status: SHIPPED | OUTPATIENT
Start: 2025-03-26 | End: 2025-04-02

## 2025-03-26 NOTE — PATIENT INSTRUCTIONS
Please take Keflex 4 times daily for the next 7 days.   Please follow-up with your urologist for no improvement or worsening of symptoms.    Drink plenty of water and other fluids to help flush out bacteria and dilute your urine.    You may take OTC pain relievers such as Tylenol or Ibuprofen, and use a warm heating pad to help with your pain.  Avoid drinks that may irritate your bladder including coffee, alcohol, and citrus drinks.  Drinking cranberry juice or taking cranberry products may help reduce recurrent UTI's.      Please follow up with PCP or proceed to the ER for worsening symptoms (fever, chills, back/pelvic pain, and bloody urine).      If your symptoms do not improve with our current treatment plan or worsen, please schedule an appointment with your PCP. If you develop any high or persistent fevers, chest pain, palpitations, shortness of breath, difficulty swallowing, decreased urine output, abdominal pain, dizziness or any other concerning symptoms, please proceed to the ED.

## 2025-03-26 NOTE — PROGRESS NOTES
Patient managed by Dr. Mario at the [facility].  He has a history of prostate cancer.    Patient called with complaints of burning with urination, increased urgency and hesitancy mostly at night.   Offered for pt to come into the office tomorrow for PVR with the RN to further assess his urinary symptoms. Pt states he will be out of town tomorrow and cannot come to the office. He states he is urinating well during the day, but at night he is having issues.   Pt will go today for urine testing and see what the results show to determine if he needs to make an appointment in the office.   ER precautions were reviewed with him if he cannot urinate or his symptoms worsen. He verbalized his understanding.     Orders placed for UA C&S to rule out urinary tract infection.  Office will follow up as results become available usually within 48-72 hours.  Patient encouraged to hydrate well with water and avoid bladder irritants.  Patient instructed to call back with worsening symptoms, fever, chills, blood in the urine or difficulty urinating.

## 2025-03-26 NOTE — PROGRESS NOTES
I reached out and spoke with Henry, now that consults have been completed with the oncology teams. I introduced myself and explained my role as their Patient Navigator. I reviewed for any barriers to care and offered referrals to supportive services as needed. I reviewed and updated the members assigned to the care team in [facility]. He knows the members of the care team as well as how and when to contact them with any needs.     Distress Thermometer completed at this time. Patient scored 1/10. Based on responses to DT, no indication for referral to SW needed at this time.     He is currently able to drive and denies any transportation needs.      He denies any uncontrolled symptoms. Discussed role of Palliative Care in symptom and side effect management. Declined referral at this time.    He states that he is eating and drinking as per usual with no unintentional weight loss.   Completed MST and patient Does not meet criteria for referral to Oncology Dietician Services    Patient does not smoke.     He states he is well supported by family and friends.  Community support groups discussed including the Cancer Support Community of the [facility]. Patient declined information at this time.     He feels he has adequate insurance coverage and denies any financial concerns at this time.     He verbalizes managing the schedules well.   Future Appointments   Date Time Provider Department Center   6/2/2025  9:00 AM CA HV [facility] 1 GH CARB CNI GH [facility]   6/2/2025 10:00 AM Kanu Richardson MD [facility]   6/12/2025  9:00 AM Vaughn Barrios MD AL Rad Onc AL [facility]   9/18/2025  9:40 AM Kolton Mina PA-C URO AL LV [facility]-[place]      He is having trouble urinating. He stated that he has urgency and burning with urination. He also struggles to get his urine out.    Based on individual needs I will follow up in about 8 weeks. I have provided my direct contact information and welcome them to contact me if  needs as discussed above change. He was appreciative for the call.

## 2025-03-26 NOTE — PROGRESS NOTES
[facility]        NAME: Henry Hickman is a 84 y.o. male  : 1940    MRN: [id number]  DATE: 2025  TIME: 5:01 PM    Assessment and Plan   Dysuria [R30.0]  1. Dysuria  POCT urine dip    Urine culture    Urine culture    cephalexin (KEFLEX) 500 mg capsule        Urine dip in office shows moderate amount of leukocytes and trace amount of protein.  Will send urine culture. Crcl 57. Will treat empirically with Keflex.  Instructed patient to follow-up with his urologist for no improvement or worsening of symptoms given his history of prostate cancer. Instructed patient to follow-up with PCP for no improvement or worsening of symptoms.  Patient educated on red flag symptoms and when to proceed to the ED.  Patient agreeable and understands current treatment plan.     Patient Instructions     Patient Instructions   Please take Keflex 4 times daily for the next 7 days.   Please follow-up with your urologist for no improvement or worsening of symptoms.    Drink plenty of water and other fluids to help flush out bacteria and dilute your urine.    You may take OTC pain relievers such as Tylenol or Ibuprofen, and use a warm heating pad to help with your pain.  Avoid drinks that may irritate your bladder including coffee, alcohol, and citrus drinks.  Drinking cranberry juice or taking cranberry products may help reduce recurrent UTI's.      Please follow up with PCP or proceed to the ER for worsening symptoms (fever, chills, back/pelvic pain, and bloody urine).      If your symptoms do not improve with our current treatment plan or worsen, please schedule an appointment with your PCP. If you develop any high or persistent fevers, chest pain, palpitations, shortness of breath, difficulty swallowing, decreased urine output, abdominal pain, dizziness or any other concerning symptoms, please proceed to the ED.       Follow up with PCP in 3-5 days.  Proceed to  ER if symptoms worsen.    Chief Complaint      Chief Complaint   Patient presents with   • Possible UTI     Urinary frequency and urgency for 3 days         History of Present Illness       84-year-old male presents to the clinic for evaluation of urinary symptoms x 4 days.  Patient reports burning with urination, urinary frequency and urgency.  Patient reports his symptoms are gradually worsening.  He denies any fevers, chills, nausea, vomiting, diarrhea, chest pain, shortness of breath, cough, pelvic pain, flank pain, back pain, hematuria or bodyaches.  Patient reports he has a history of prostate cancer in which he is following with a urologist for.  He does state he has had some urinary symptoms on and off however his symptoms have never been this bad before.  He reports he has been frequently waking up in the middle the night to go to the bathroom.  Patient denies taking any OTC medications for symptoms.              Review of Systems   Review of Systems   Constitutional:  Negative for chills and fever.   HENT:  Negative for congestion, ear pain and sore throat.    Respiratory:  Negative for cough and shortness of breath.    Cardiovascular:  Negative for chest pain.   Gastrointestinal:  Negative for abdominal pain, diarrhea, nausea and vomiting.   Genitourinary:  Positive for dysuria, frequency and urgency. Negative for flank pain, hematuria and testicular pain.   Musculoskeletal:  Negative for arthralgias and myalgias.   Neurological:  Negative for dizziness, light-headedness and headaches.         Current Medications       Current Outpatient Medications:   •  aspirin (ECOTRIN LOW STRENGTH) 81 mg EC tablet, Take 1 tablet by mouth daily, Disp: , Rfl:   •  cephalexin (KEFLEX) 500 mg capsule, Take 1 capsule (500 mg total) by mouth every 6 (six) hours for 7 days, Disp: 28 capsule, Rfl: 0  •  Cholecalciferol 50 MCG (2000 UT) CAPS, Take 1 capsule by mouth daily Pt reported taking every other day, Disp: , Rfl:   •  dutasteride (AVODART) 0.5 mg capsule, Take 1  capsule by mouth in the morning, Disp: , Rfl:   •  famotidine (PEPCID) 20 mg tablet, Take 20 mg by mouth 2 (two) times a day, Disp: , Rfl:   •  levothyroxine 75 mcg tablet, Take 1 tablet by mouth daily, Disp: , Rfl:   •  tamsulosin (FLOMAX) 0.4 mg, Take 0.8 mg by mouth daily with dinner, Disp: , Rfl:     Current Allergies     Allergies as of 03/26/2025 - Reviewed 03/26/2025   Allergen Reaction Noted   • Clarithromycin Other (See Comments) 12/24/2013            The following portions of the patient's history were reviewed and updated as appropriate: allergies, current medications, past family history, past medical history, past social history, past surgical history and problem list.     Past Medical History:   Diagnosis Date   • BPH (benign prostatic hyperplasia)    • Disease of thyroid gland    • GERD (gastroesophageal reflux disease)    • PVC's (premature ventricular contractions)        Past Surgical History:   Procedure Laterality Date   • EYE SURGERY     • HERNIA REPAIR     • ROTATOR CUFF REPAIR     • US GUIDED PROSTATE BIOPSY  2/4/2025       No family history on file.      Medications have been verified.        Objective   /73   Pulse 63   Temp 97.9 °F (36.6 °C)   Resp 18   SpO2 95%        Physical Exam     Physical Exam  Vitals and nursing note reviewed.   Constitutional:       Appearance: Normal appearance.   HENT:      Head: Normocephalic and atraumatic.   Cardiovascular:      Rate and Rhythm: Normal rate and regular rhythm.      Pulses: Normal pulses.      Heart sounds: Normal heart sounds.   Pulmonary:      Effort: Pulmonary effort is normal.      Breath sounds: Normal breath sounds.   Abdominal:      General: Bowel sounds are normal. There is no distension.      Palpations: Abdomen is soft.      Tenderness: There is no abdominal tenderness. There is no right CVA tenderness or left CVA tenderness.   Skin:     General: Skin is warm and dry.   Neurological:      General: No focal deficit present.       Mental Status: He is alert.   Psychiatric:         Mood and Affect: Mood normal.         Behavior: Behavior normal.

## 2025-03-26 NOTE — PROGRESS NOTES
Patient is scheduled for SpaceOAR/Fiducial markers on 6/4/25.   Patient is scheduled for Radiation SIM on 6/12/25. I will make sure patient follows back up with urology 3-6 months after completion of radiation with PSA prior to the visit.

## 2025-03-27 LAB — BACTERIA UR CULT: ABNORMAL

## 2025-03-27 NOTE — PROGRESS NOTES
Urinalysis reviewed which shows moderate leukocytes, urine culture still pending.  Lupron can mimic urinary tract symptoms such as urinary frequency and urge incontinence.  Would continue to monitor for final urine culture and treat if indicated. Review comfort measures for now.

## 2025-03-28 ENCOUNTER — TELEPHONE (OUTPATIENT)
Dept: UROLOGY | Facility: MEDICAL CENTER | Age: 85
End: 2025-03-28

## 2025-03-28 NOTE — PROGRESS NOTES
Call placed to pt. He did not answer. LMOM in detail informing pt of the CRNP recommendations and plan. Pt is to continue Keflex as prescribed by PCP.   Office number was provided for call back if pt needed

## 2025-05-05 ENCOUNTER — PREP FOR PROCEDURE (OUTPATIENT)
Dept: UROLOGY | Facility: MEDICAL CENTER | Age: 85
End: 2025-05-05

## 2025-05-05 DIAGNOSIS — C61 PROSTATE CANCER (HCC): Primary | ICD-10-CM

## 2025-05-05 DIAGNOSIS — R39.89 SUSPECTED UTI: ICD-10-CM

## 2025-05-05 DIAGNOSIS — Z01.812 PRE-OPERATIVE LABORATORY EXAMINATION: ICD-10-CM

## 2025-05-05 DIAGNOSIS — Z01.810 PRE-OPERATIVE CARDIOVASCULAR EXAMINATION: ICD-10-CM

## 2025-05-20 ENCOUNTER — APPOINTMENT (OUTPATIENT)
Dept: LAB | Facility: HOSPITAL | Age: 85
End: 2025-05-20
Payer: COMMERCIAL

## 2025-05-20 ENCOUNTER — OFFICE VISIT (OUTPATIENT)
Dept: LAB | Facility: HOSPITAL | Age: 85
End: 2025-05-20
Payer: COMMERCIAL

## 2025-05-20 DIAGNOSIS — C61 PROSTATE CANCER (HCC): ICD-10-CM

## 2025-05-20 DIAGNOSIS — Z01.812 PRE-OPERATIVE LABORATORY EXAMINATION: ICD-10-CM

## 2025-05-20 DIAGNOSIS — Z01.810 PRE-OPERATIVE CARDIOVASCULAR EXAMINATION: ICD-10-CM

## 2025-05-20 DIAGNOSIS — R39.89 SUSPECTED UTI: ICD-10-CM

## 2025-05-20 LAB
ALBUMIN SERPL BCG-MCNC: 3.9 G/DL (ref 3.5–5)
ALP SERPL-CCNC: 52 U/L (ref 34–104)
ALT SERPL W P-5'-P-CCNC: 16 U/L (ref 7–52)
ANION GAP SERPL CALCULATED.3IONS-SCNC: 6 MMOL/L (ref 4–13)
AST SERPL W P-5'-P-CCNC: 16 U/L (ref 13–39)
ATRIAL RATE: 47 BPM
BASOPHILS # BLD AUTO: 0.02 THOUSANDS/ÂΜL (ref 0–0.1)
BASOPHILS NFR BLD AUTO: 1 % (ref 0–1)
BILIRUB SERPL-MCNC: 0.57 MG/DL (ref 0.2–1)
BUN SERPL-MCNC: 35 MG/DL (ref 5–25)
CALCIUM SERPL-MCNC: 9.2 MG/DL (ref 8.4–10.2)
CHLORIDE SERPL-SCNC: 102 MMOL/L (ref 96–108)
CO2 SERPL-SCNC: 31 MMOL/L (ref 21–32)
CREAT SERPL-MCNC: 1.43 MG/DL (ref 0.6–1.3)
EOSINOPHIL # BLD AUTO: 0.1 THOUSAND/ÂΜL (ref 0–0.61)
EOSINOPHIL NFR BLD AUTO: 3 % (ref 0–6)
ERYTHROCYTE [DISTWIDTH] IN BLOOD BY AUTOMATED COUNT: 12.3 % (ref 11.6–15.1)
GFR SERPL CREATININE-BSD FRML MDRD: 44 ML/MIN/1.73SQ M
GLUCOSE P FAST SERPL-MCNC: 91 MG/DL (ref 65–99)
HCT VFR BLD AUTO: 41.7 % (ref 36.5–49.3)
HGB BLD-MCNC: 13.5 G/DL (ref 12–17)
IMM GRANULOCYTES # BLD AUTO: 0.01 THOUSAND/UL (ref 0–0.2)
IMM GRANULOCYTES NFR BLD AUTO: 0 % (ref 0–2)
LYMPHOCYTES # BLD AUTO: 0.97 THOUSANDS/ÂΜL (ref 0.6–4.47)
LYMPHOCYTES NFR BLD AUTO: 25 % (ref 14–44)
MCH RBC QN AUTO: 29.9 PG (ref 26.8–34.3)
MCHC RBC AUTO-ENTMCNC: 32.4 G/DL (ref 31.4–37.4)
MCV RBC AUTO: 93 FL (ref 82–98)
MONOCYTES # BLD AUTO: 0.28 THOUSAND/ÂΜL (ref 0.17–1.22)
MONOCYTES NFR BLD AUTO: 7 % (ref 4–12)
NEUTROPHILS # BLD AUTO: 2.53 THOUSANDS/ÂΜL (ref 1.85–7.62)
NEUTS SEG NFR BLD AUTO: 64 % (ref 43–75)
NRBC BLD AUTO-RTO: 0 /100 WBCS
P AXIS: 57 DEGREES
PLATELET # BLD AUTO: 177 THOUSANDS/UL (ref 149–390)
PMV BLD AUTO: 10.5 FL (ref 8.9–12.7)
POTASSIUM SERPL-SCNC: 4.4 MMOL/L (ref 3.5–5.3)
PR INTERVAL: 200 MS
PROT SERPL-MCNC: 6.5 G/DL (ref 6.4–8.4)
QRS AXIS: 66 DEGREES
QRSD INTERVAL: 94 MS
QT INTERVAL: 486 MS
QTC INTERVAL: 430 MS
RBC # BLD AUTO: 4.51 MILLION/UL (ref 3.88–5.62)
SODIUM SERPL-SCNC: 139 MMOL/L (ref 135–147)
T WAVE AXIS: 75 DEGREES
VENTRICULAR RATE: 47 BPM
WBC # BLD AUTO: 3.91 THOUSAND/UL (ref 4.31–10.16)

## 2025-05-20 PROCEDURE — 93005 ELECTROCARDIOGRAM TRACING: CPT

## 2025-05-20 PROCEDURE — 93010 ELECTROCARDIOGRAM REPORT: CPT | Performed by: INTERNAL MEDICINE

## 2025-05-20 PROCEDURE — 85025 COMPLETE CBC W/AUTO DIFF WBC: CPT

## 2025-05-20 PROCEDURE — 36415 COLL VENOUS BLD VENIPUNCTURE: CPT

## 2025-05-20 PROCEDURE — 80053 COMPREHEN METABOLIC PANEL: CPT

## 2025-05-20 PROCEDURE — 87086 URINE CULTURE/COLONY COUNT: CPT

## 2025-05-21 ENCOUNTER — RESULTS FOLLOW-UP (OUTPATIENT)
Dept: OTHER | Facility: HOSPITAL | Age: 85
End: 2025-05-21

## 2025-05-21 LAB — BACTERIA UR CULT: ABNORMAL

## 2025-05-23 NOTE — PRE-PROCEDURE INSTRUCTIONS
Pre-Surgery Instructions:   Medication Instructions    aspirin (ECOTRIN LOW STRENGTH) 81 mg EC tablet  Patient holding ASA on own as of 5/23    Cholecalciferol 50 MCG (2000 UT) CAPS Hold day of surgery.    dutasteride (AVODART) 0.5 mg capsule Per patient not currently taking    famotidine (PEPCID) 20 mg tablet Take day of surgery.    levothyroxine 75 mcg tablet Take day of surgery.    tamsulosin (FLOMAX) 0.4 mg Take night before surgery    Medication instructions for day of surgery reviewed. Please take all instructed medications with only a sip of water.       You will receive a call one business day prior to surgery with an arrival time and hospital directions. If your surgery is scheduled on a Monday, the hospital will be calling you on the Friday prior to your surgery. If you have not heard from anyone by 8pm, please call the hospital supervisor through the hospital  at [phone]. (Ulen [phone] or Rowena [phone]).    Do not eat or drink anything after midnight the night before your surgery, including candy, mints, lifesavers, or chewing gum. Do not drink alcohol 24hrs before your surgery. Try not to smoke at least 24hrs before your surgery.       Follow the pre surgery showering instructions as listed in the “My Surgical Experience Booklet” or otherwise provided by your surgeon's office. Do not use a blade to shave the surgical area 1 week before surgery. It is okay to use a clean electric clippers up to 24 hours before surgery. Do not apply any lotions, creams, including makeup, cologne, deodorant, or perfumes after showering on the day of your surgery. Do not use dry shampoo, hair spray, hair gel, or any type of hair products.     No contact lenses, eye make-up, or artificial eyelashes. Remove nail polish, including gel polish, and any artificial, gel, or acrylic nails if possible. Remove all jewelry including rings and body piercing jewelry.     Wear causal clothing that is easy to  take on and off. Consider your type of surgery.    Keep any valuables, jewelry, piercings at home. Please bring any specially ordered equipment (sling, braces) if indicated.    Arrange for a responsible person to drive you to and from the hospital on the day of your surgery. Please confirm the visitor policy for the day of your procedure when you receive your phone call with an arrival time.     Call the surgeon's office with any new illnesses, exposures, or additional questions prior to surgery.    Please reference your “My Surgical Experience Booklet” for additional information to prepare for your upcoming surgery.

## 2025-05-30 ENCOUNTER — PATIENT OUTREACH (OUTPATIENT)
Dept: HEMATOLOGY ONCOLOGY | Facility: CLINIC | Age: 85
End: 2025-05-30

## 2025-05-30 NOTE — PROGRESS NOTES
I reached out and spoke with Henry to follow up and to review for any new changes in barriers to care and offer supportive services as needed.     Barriers noted previously and outcome of interventions;  none    Reviewed for any new barriers as noted below.    Are you having any side effects from your treatment?No    Are you eating and drinking normally? yes    Have you been experiencing any uncontrolled pain related to your cancer diagnosis? No    If not already established, have needs changed for a palliative care referral? no    Do you have a good support system? Yes     Are you interested in any support groups? N/A    How are you doing with transportation to your appointments? He is driving    Do you have any questions or concerns regarding your treatment plan? No    Any new financial concerns for your household or medical bills? No    Do you know when your upcoming appointments are? yes  Future Appointments   Date Time Provider Department Center   6/2/2025  9:00 AM CA HV ECHO [facility] 1 GH CARB CNI GH [facility]   6/2/2025 10:00 AM Kanu Richardson MD [facility] Cardio [facility]   6/12/2025  9:00 AM Vaughn Barrios MD AL Rad Onc AL [facility]   9/18/2025  9:40 AM GISELLE Apodaca AL LV [facility]-[place]      Patient doing well. He does not have any questions or concerns at this time      Based on individual needs I will follow up with them in 4 weeks. I have provided my direct contact information and welcome them to contact me if their needs as discussed above change. They were appreciative for the call.

## 2025-06-02 ENCOUNTER — HOSPITAL ENCOUNTER (OUTPATIENT)
Dept: NON INVASIVE DIAGNOSTICS | Facility: HOSPITAL | Age: 85
Discharge: HOME | End: 2025-06-02
Payer: COMMERCIAL

## 2025-06-02 ENCOUNTER — OFFICE VISIT (OUTPATIENT)
Dept: CARDIOLOGY CLINIC | Facility: CLINIC | Age: 85
End: 2025-06-02
Payer: COMMERCIAL

## 2025-06-02 ENCOUNTER — HOSPITAL ENCOUNTER (OUTPATIENT)
Dept: NON INVASIVE DIAGNOSTICS | Facility: CLINIC | Age: 85
Discharge: HOME | End: 2025-06-02
Payer: COMMERCIAL

## 2025-06-02 VITALS
HEIGHT: 71 IN | SYSTOLIC BLOOD PRESSURE: 116 MMHG | HEART RATE: 51 BPM | BODY MASS INDEX: 30.24 KG/M2 | WEIGHT: 216 LBS | DIASTOLIC BLOOD PRESSURE: 68 MMHG

## 2025-06-02 VITALS
BODY MASS INDEX: 30.24 KG/M2 | HEART RATE: 49 BPM | SYSTOLIC BLOOD PRESSURE: 122 MMHG | HEIGHT: 71 IN | DIASTOLIC BLOOD PRESSURE: 73 MMHG | WEIGHT: 216 LBS

## 2025-06-02 DIAGNOSIS — I71.21 ANEURYSM OF ASCENDING AORTA WITHOUT RUPTURE (HCC): ICD-10-CM

## 2025-06-02 DIAGNOSIS — R00.1 BRADYCARDIA: ICD-10-CM

## 2025-06-02 DIAGNOSIS — I25.10 CORONARY ARTERY DISEASE INVOLVING NATIVE CORONARY ARTERY OF NATIVE HEART WITHOUT ANGINA PECTORIS: ICD-10-CM

## 2025-06-02 DIAGNOSIS — Z01.810 PRE-OPERATIVE CARDIOVASCULAR EXAMINATION: Primary | ICD-10-CM

## 2025-06-02 LAB
AORTIC ROOT: 4.2 CM
AORTIC VALVE MEAN VELOCITY: 12 M/S
ASCENDING AORTA: 4 CM
AV AREA BY CONTINUOUS VTI: 1.7 CM2
AV AREA PEAK VELOCITY: 1.5 CM2
AV LVOT MEAN GRADIENT: 1 MMHG
AV LVOT PEAK GRADIENT: 3 MMHG
AV MEAN PRESS GRAD SYS DOP V1V2: 7 MMHG
AV ORIFICE AREA US: 1.74 CM2
AV PEAK GRADIENT: 16 MMHG
AV VELOCITY RATIO: 0.46
AV VMAX SYS DOP: 2 M/S
BSA FOR ECHO PROCEDURE: 2.18 M2
DOP CALC AO VTI: 43.63 CM
DOP CALC LVOT AREA: 3.8 CM2
DOP CALC LVOT CARDIAC INDEX: 1.66 L/MIN/M2
DOP CALC LVOT CARDIAC OUTPUT: 3.62 L/MIN
DOP CALC LVOT DIAMETER: 2.2 CM
DOP CALC LVOT PEAK VEL VTI: 19.93 CM
DOP CALC LVOT PEAK VEL: 0.8 M/S
DOP CALC LVOT STROKE INDEX: 35.8 ML/M2
DOP CALC LVOT STROKE VOLUME: 78
E WAVE DECELERATION TIME: 290 MS
E/A RATIO: 0.89
FRACTIONAL SHORTENING: 32 (ref 28–44)
INTERVENTRICULAR SEPTUM IN DIASTOLE (PARASTERNAL SHORT AXIS VIEW): 1.2 CM
INTERVENTRICULAR SEPTUM: 1.2 CM (ref 0.6–1.1)
LAAS-AP2: 21.1 CM2
LAAS-AP4: 26.6 CM2
LEFT ATRIUM SIZE: 2.7 CM
LEFT ATRIUM VOLUME (MOD BIPLANE): 71 ML
LEFT ATRIUM VOLUME INDEX (MOD BIPLANE): 32.6 ML/M2
LEFT INTERNAL DIMENSION IN SYSTOLE: 2.8 CM (ref 2.1–4)
LEFT VENTRICULAR INTERNAL DIMENSION IN DIASTOLE: 4.1 CM (ref 3.5–6)
LEFT VENTRICULAR POSTERIOR WALL IN END DIASTOLE: 1.1 CM
LEFT VENTRICULAR STROKE VOLUME: 45 ML
LV EF US.2D.A4C+ESTIMATED: 54 %
LVSV (TEICH): 45 ML
MV E'TISSUE VEL-SEP: 7 CM/S
MV PEAK A VEL: 1.11 M/S
MV PEAK E VEL: 99 CM/S
MV STENOSIS PRESSURE HALF TIME: 84 MS
MV VALVE AREA P 1/2 METHOD: 2.6
RIGHT ATRIUM AREA SYSTOLE A4C: 15.9 CM2
RIGHT VENTRICLE ID DIMENSION: 3.2 CM
SINOTUBULAR JUNCTION: 3.7 CM
SL CV LEFT ATRIUM LENGTH A2C: 6.2 CM
SL CV LV EF: 55
SL CV PED ECHO LEFT VENTRICLE DIASTOLIC VOLUME (MOD BIPLANE) 2D: 75 ML
SL CV PED ECHO LEFT VENTRICLE SYSTOLIC VOLUME (MOD BIPLANE) 2D: 29 ML
SL CV SINUS OF VALSALVA 2D: 4.2 CM
STJ: 3.7 CM
TR MAX PG: 36 MMHG
TR PEAK VELOCITY: 3 M/S
TRICUSPID ANNULAR PLANE SYSTOLIC EXCURSION: 2.3 CM
TRICUSPID VALVE PEAK REGURGITATION VELOCITY: 3.01 M/S

## 2025-06-02 PROCEDURE — 93306 TTE W/DOPPLER COMPLETE: CPT

## 2025-06-02 PROCEDURE — 93000 ELECTROCARDIOGRAM COMPLETE: CPT | Performed by: INTERNAL MEDICINE

## 2025-06-02 PROCEDURE — 93306 TTE W/DOPPLER COMPLETE: CPT | Performed by: INTERNAL MEDICINE

## 2025-06-02 PROCEDURE — 99214 OFFICE O/P EST MOD 30 MIN: CPT | Performed by: INTERNAL MEDICINE

## 2025-06-02 NOTE — PROGRESS NOTES
" Patient ID: Henry Hickman is a 85 y.o. male.        Plan:      Assessment & Plan  Coronary artery disease involving native coronary artery of native heart without angina pectoris  Prior angina but no recent symptoms.  Myoview in 2023  Bradycardia  Longstanding but no symptoms.  Heart rate acceptable.  Aneurysm of ascending aorta without rupture (HCC)  This has been stable.  Repeat echo is pending.  Pre-operative cardiovascular examination  Okay for urologic surgery as planned at reasonable risk.      Follow up Plan/Other summary comments:  Return in about 1 year (around 6/2/2026).    HPI: Patient seen in follow-up today regarding the above.  Since last visit he has felt reasonably well.  He is to have prostate surgery later this week.  No chest pain.  No palpitations or change in exertional capacity over the past year.      Results for orders placed or performed in visit on 06/02/25   POCT ECG    Impression    Sinus rhythm at 51 bpm.  Otherwise normal.  No change from prior.         Most recent or relevant cardiac/vascular testing:    TTE 12/18/2020: Normal LV systolic function.  4.3 cm ascending aorta.  TTE 6/22/2023: No change.    Myoview 6/22/2023: WNL.      Past Surgical History[1]    Lipid Profile: Reviewed      Review of Systems   10  point ROS  was otherwise non pertinent or negative except as per HPI or as below.   Gait:  Normal.        Objective:     /68   Pulse (!) 51   Ht 5' 11\" (1.803 m)   Wt 98 kg (216 lb)   BMI 30.13 kg/m²     PHYSICAL EXAM:    General:  Normal appearance in no distress.  Eyes:  Anicteric.  Oral mucosa:  Moist.  Neck:  No JVD. Carotid upstrokes are brisk without bruits.  No masses.  Chest:  Clear to auscultation.  Cardiac:  No palpable PMI.  Normal S1 and S2.  No murmur gallop or rub.  Abdomen:  Soft and nontender. No palpable organomegaly or aortic enlargement.  Extremities: Trace to 1+ ankle edema.  Musculoskeletal:  Symmetric.   Vascular:  Femoral pulses are brisk " without bruits.  Popliteal pulses are intact bilaterally.   Pedal pulses are intact.  Neuro:  Grossly symmetric.  Psych:  Alert and oriented x3.      Meds reviewed.    Past Medical History[2]        Tobacco Use History[3]               [1]   Past Surgical History:  Procedure Laterality Date    EYE SURGERY      HERNIA REPAIR      ROTATOR CUFF REPAIR      US GUIDED PROSTATE BIOPSY  2025   [2]   Past Medical History:  Diagnosis Date    Aneurysm of ascending aorta (HCC)     BPH (benign prostatic hyperplasia)     Bradycardia     CAD (coronary artery disease)     Cardiomegaly     Disease of thyroid gland     GERD (gastroesophageal reflux disease)     PVC's (premature ventricular contractions)     Thoracic aortic aneurysm (HCC)    [3]   Social History  Tobacco Use   Smoking Status Former    Current packs/day: 0.00    Average packs/day: 0.8 packs/day for 14.0 years (10.5 ttl pk-yrs)    Types: Cigarettes    Start date:     Quit date: 1970    Years since quittin.4   Smokeless Tobacco Never

## 2025-06-02 NOTE — ASSESSMENT & PLAN NOTE
Longstanding but no symptoms.  Heart rate acceptable.   [facility] PALLIATIVE MEDICINE     CC: FOLLOW UP VISIT + GOC    INTERVAL HPI/OVERNIGHT EVENTS:  Source if other than patient:  []Family   [x]Team     Seen and examined earlier this morning. Intensivist also at bedside  S/P paracentesis and LP yesterday with CSF positive for cryptococcal Antigen   Pt more awake today, able to follow limited commands.  Now extubated to nasal cannula    PRESENT SYMPTOMS:     Dyspnea: No  Nausea/Vomiting:  No  Anxiety:   No  Depression: unable to assess  Fatigue: No  Loss of appetite: NPO  Constipation: No    Pain: No            Character-            Duration-            Effect-            Factors-            Frequency-            Location-            Severity-    Pain AD Score:  http://[email or web address]/painad.pdf (press ctrl + left click to view)    Review of Systems: Limited due to intermittent confusion    MEDICATIONS  (STANDING):  amphotericin B  liposome  IVPB 320 milliGRAM(s) IV Intermittent every 24 hours  atorvastatin 80 milliGRAM(s) Oral at bedtime  chlorhexidine 0.12% Liquid 15 milliLiter(s) Oral Mucosa every 12 hours  chlorhexidine 2% Cloths 1 Application(s) Topical <User Schedule>  dextrose 50% Injectable 25 Gram(s) IV Push once  dextrose 50% Injectable 12.5 Gram(s) IV Push once  dextrose 50% Injectable 25 Gram(s) IV Push once  dextrose Oral Gel 15 Gram(s) Oral once  flucytosine 1500 milliGRAM(s) Oral <User Schedule>  glucagon  Injectable 1 milliGRAM(s) IntraMuscular once  heparin   Injectable 5000 Unit(s) SubCutaneous every 8 hours  hydrocortisone 10 milliGRAM(s) Oral two times a day  meropenem Injectable 1000 milliGRAM(s) IV Push <User Schedule>  metoprolol tartrate 12.5 milliGRAM(s) Enteral Tube every 12 hours  pantoprazole  Injectable 40 milliGRAM(s) IV Push daily  propofol Infusion 10 MICROgram(s)/kG/Min (3.8 mL/Hr) IV Continuous <Continuous>  sevelamer carbonate Powder 800 milliGRAM(s) Oral three times a day with meals  tacrolimus 1.5 milliGRAM(s) Oral daily  trimethoprim  40 mG/sulfamethoxazole 200 mG Suspension 80 milliGRAM(s) Oral <User Schedule>    MEDICATIONS  (PRN):  HYDROmorphone  Injectable 0.5 milliGRAM(s) IV Push every 4 hours PRN Severe Pain (7 - 10)  OLANZapine Injectable 5 milliGRAM(s) IntraMuscular every 6 hours PRN agitation    PHYSICAL EXAM:    Vital Signs Last 24 Hrs  T(C): 36.6 (20 Dec 2022 10:00), Max: 36.8 (19 Dec 2022 12:00)  T(F): 97.9 (20 Dec 2022 10:00), Max: 98.2 (19 Dec 2022 12:00)  HR: 103 (20 Dec 2022 10:00) (66 - 115)  BP: 134/81 (20 Dec 2022 10:00) (94/48 - 154/130)  BP(mean): 94 (20 Dec 2022 10:00) (63 - 139)  RR: 17 (20 Dec 2022 10:00) (13 - 25)  SpO2: 100% (20 Dec 2022 10:00) (90% - 100%)    Parameters below as of 20 Dec 2022 10:00  Patient On (Oxygen Delivery Method): nasal cannula    O2 Concentration (%): 3    Karnofsky:  30%    GEN: chronically ill. resting comfortably and no acute distress    HEENT: mucous membrane moist       Lungs: comfortable, nonlabored     CV: +s1/s2 regular rate and rhythm      GI: +BS, abdomen soft, nontender, nondistended      : anuric    MSK:  no cyanosis. +generalized edema +weakness       NEURO: nonfocal. confused but wakes to verbal stimuli and able to follow limited commands    Skin: warm and dry.      LABS:                          11.3   11.38 )-----------( 114      ( 20 Dec 2022 03:45 )             33.7     12-20    135  |  94<L>  |  27.9<H>  ----------------------------<  175<H>  3.5   |  28.0  |  3.97<H>    Ca    8.3<L>      20 Dec 2022 03:45  Phos  3.7     12-20  Mg     2.0     12-20    TPro  5.2<L>  /  Alb  2.7<L>  /  TBili  0.4  /  DBili  x   /  AST  34  /  ALT  8   /  AlkPhos  221<H>  12-20      RADIOLOGY & ADDITIONAL STUDIES:     CXR    ACC: [id number] EXAM:  XR CHEST PORTABLE URGENT 1V                          PROCEDURE DATE:  12/19/2022          INTERPRETATION:  HISTORY: Admitting Dxs: R41.82 AMS;  rule out right   diaphragmatic air;  TECHNIQUE: Portable frontal view of the chest, 1 view.  COMPARISON: 12/18/2022.  FINDINGS/  IMPRESSION:  Endotracheal tube terminates just above the konrad. The nasogastric tube   courses below the left hemidiaphragm, tip off edge of film.. A   right-sided PICC line is seen in the SVC region. Multiple wires overlie   the patient.  HEART:  Enlarged  LUNGS: Bilateral patchy infiltrates are seen left greater than right with left effusion.  BONES: sternotomy wires    No free air.      --- End of Report ---    GUTIERREZ GUPTA MD; Attending Interventional Radiologist  This document has been electronically signed. Dec 19 2022  8:24PM      ADVANCE DIRECTIVES/TREATMENT PREFERENCES: FULL CODE  DNR YES NO  Completed on:                     MOLST  YES NO   Completed on:  Living Will  YES NO   Completed on:    NEUROLOGICAL MEDICATIONS/OPIOIDS/BENZODIAZEPINE IN PAST 24 HOURS:  fentaNYL    Injectable   50 MICROGram(s) IV Push (12-19-22 @ 17:26)

## 2025-06-03 ENCOUNTER — RESULTS FOLLOW-UP (OUTPATIENT)
Dept: CARDIOLOGY CLINIC | Facility: CLINIC | Age: 85
End: 2025-06-03

## 2025-06-03 ENCOUNTER — ANESTHESIA EVENT (OUTPATIENT)
Dept: PERIOP | Facility: HOSPITAL | Age: 85
End: 2025-06-03
Payer: COMMERCIAL

## 2025-06-04 ENCOUNTER — ANESTHESIA (OUTPATIENT)
Dept: PERIOP | Facility: HOSPITAL | Age: 85
End: 2025-06-04
Payer: COMMERCIAL

## 2025-06-04 ENCOUNTER — HOSPITAL ENCOUNTER (OUTPATIENT)
Facility: HOSPITAL | Age: 85
Setting detail: OUTPATIENT SURGERY
Discharge: HOME | End: 2025-06-04
Payer: COMMERCIAL

## 2025-06-04 VITALS
BODY MASS INDEX: 29.78 KG/M2 | OXYGEN SATURATION: 94 % | SYSTOLIC BLOOD PRESSURE: 166 MMHG | RESPIRATION RATE: 16 BRPM | HEIGHT: 71 IN | HEART RATE: 60 BPM | TEMPERATURE: 97.8 F | WEIGHT: 212.74 LBS | DIASTOLIC BLOOD PRESSURE: 79 MMHG

## 2025-06-04 DIAGNOSIS — C61 PROSTATE CANCER (HCC): Primary | ICD-10-CM

## 2025-06-04 PROCEDURE — 55876 PLACE RT DEVICE/MARKER PROS: CPT

## 2025-06-04 PROCEDURE — C1889 IMPLANT/INSERT DEVICE, NOC: HCPCS

## 2025-06-04 PROCEDURE — 51798 US URINE CAPACITY MEASURE: CPT

## 2025-06-04 PROCEDURE — A4648 IMPLANTABLE TISSUE MARKER: HCPCS

## 2025-06-04 PROCEDURE — 55874 TPRNL PLMT BIODEGRDABL MATRL: CPT

## 2025-06-04 DEVICE — SPACEOAR SYSTEMS
Type: IMPLANTABLE DEVICE | Site: PROSTATE | Status: FUNCTIONAL
Brand: SPACEOAR VUE™ SYSTEM - 10ML

## 2025-06-04 DEVICE — STERILE PLACEMENT NEEDLES (17GA ETW X 20CM) WITH BONE WAX AND (1.2 X 3MM) SOFT TISSUE GOLD MARKER [3]
Type: IMPLANTABLE DEVICE | Site: PROSTATE | Status: FUNCTIONAL
Brand: FIDUCIAL MARKER KIT

## 2025-06-04 RX ORDER — PROPOFOL 10 MG/ML
INJECTION, EMULSION INTRAVENOUS AS NEEDED
Status: DISCONTINUED | OUTPATIENT
Start: 2025-06-04 | End: 2025-06-04

## 2025-06-04 RX ORDER — FENTANYL CITRATE/PF 50 MCG/ML
25 SYRINGE (ML) INJECTION
Status: DISCONTINUED | OUTPATIENT
Start: 2025-06-04 | End: 2025-06-04 | Stop reason: HOSPADM

## 2025-06-04 RX ORDER — LIDOCAINE HYDROCHLORIDE 20 MG/ML
INJECTION, SOLUTION EPIDURAL; INFILTRATION; INTRACAUDAL; PERINEURAL AS NEEDED
Status: DISCONTINUED | OUTPATIENT
Start: 2025-06-04 | End: 2025-06-04

## 2025-06-04 RX ORDER — SODIUM CHLORIDE, SODIUM LACTATE, POTASSIUM CHLORIDE, CALCIUM CHLORIDE 600; 310; 30; 20 MG/100ML; MG/100ML; MG/100ML; MG/100ML
125 INJECTION, SOLUTION INTRAVENOUS CONTINUOUS
Status: DISCONTINUED | OUTPATIENT
Start: 2025-06-04 | End: 2025-06-04 | Stop reason: HOSPADM

## 2025-06-04 RX ORDER — CEFAZOLIN SODIUM 2 G/50ML
2000 SOLUTION INTRAVENOUS ONCE
Status: COMPLETED | OUTPATIENT
Start: 2025-06-04 | End: 2025-06-04

## 2025-06-04 RX ORDER — ALBUTEROL SULFATE 0.83 MG/ML
2.5 SOLUTION RESPIRATORY (INHALATION) ONCE AS NEEDED
Status: DISCONTINUED | OUTPATIENT
Start: 2025-06-04 | End: 2025-06-04 | Stop reason: HOSPADM

## 2025-06-04 RX ORDER — ONDANSETRON 2 MG/ML
4 INJECTION INTRAMUSCULAR; INTRAVENOUS ONCE AS NEEDED
Status: DISCONTINUED | OUTPATIENT
Start: 2025-06-04 | End: 2025-06-04 | Stop reason: HOSPADM

## 2025-06-04 RX ORDER — LIDOCAINE HYDROCHLORIDE 10 MG/ML
INJECTION, SOLUTION EPIDURAL; INFILTRATION; INTRACAUDAL; PERINEURAL AS NEEDED
Status: DISCONTINUED | OUTPATIENT
Start: 2025-06-04 | End: 2025-06-04 | Stop reason: HOSPADM

## 2025-06-04 RX ADMIN — PROPOFOL 50 MG: 10 INJECTION, EMULSION INTRAVENOUS at 14:51

## 2025-06-04 RX ADMIN — SODIUM CHLORIDE, SODIUM LACTATE, POTASSIUM CHLORIDE, AND CALCIUM CHLORIDE 125 ML/HR: .6; .31; .03; .02 INJECTION, SOLUTION INTRAVENOUS at 12:46

## 2025-06-04 RX ADMIN — CEFAZOLIN SODIUM 2000 MG: 2 SOLUTION INTRAVENOUS at 14:51

## 2025-06-04 RX ADMIN — PROPOFOL 50 MG: 10 INJECTION, EMULSION INTRAVENOUS at 14:52

## 2025-06-04 RX ADMIN — LIDOCAINE HYDROCHLORIDE 60 MG: 20 INJECTION, SOLUTION EPIDURAL; INFILTRATION; INTRACAUDAL at 14:51

## 2025-06-04 NOTE — DISCHARGE INSTR - AVS FIRST PAGE
Mr. Hickman,     Your procedure went well.  I was able to place the spacer gel and fiducial markers    Some blood in the urine is normal for approximately 1 week after surgery.  It can last in the ejaculate for up to 1 month.    Most patients do not need prescription medications after this procedure (including no antibiotics or narcotic pain medications).  Please try taking Tylenol and Motrin over-the-counter if you have discomfort.  If you are having significant pain issues please contact me.    Please call us immediately if you are having fevers or chills or feel like you have a infection.    Some patients can have difficulty with voiding after the procedure because of swelling of the prostate which can block the urethra.  This usually improves with time but if you are having difficulty voiding please let us know as we may need to temporarily insert a catheter.      You have any questions or concerns please do not hesitate to call us, [phone].    Prakash Hendrickson MD   [facility]   [facility]

## 2025-06-04 NOTE — ANESTHESIA PREPROCEDURE EVALUATION
Procedure:  INSERTION OF FIDUCIAL MARKER (TRANSRECTAL ULTRASOUND GUIDANCE), SPACEOAR (Penis)    Relevant Problems   CARDIO   (+) Atrial ectopy   (+) Coronary artery disease involving native coronary artery of native heart without angina pectoris   (+) PVC (premature ventricular contraction)   (+) Thoracic aortic aneurysm (HCC)   (+) Vascular abnormality      /RENAL   (+) Chronic kidney disease, stage 3a (HCC)   (+) Prostate cancer (HCC)      PULMONARY   (+) Dyspnea        Physical Exam    Airway     Mallampati score: II  TM Distance: >3 FB        Cardiovascular  Cardiovascular exam normal    Dental   No notable dental hx     Pulmonary  Pulmonary exam normal     Neurological  - normal exam    Other Findings        Anesthesia Plan  ASA Score- 3     Anesthesia Type- IV sedation with anesthesia with ASA Monitors.         Additional Monitors:     Airway Plan:            Plan Factors-Exercise tolerance (METS): >4 METS.    Chart reviewed.        Patient is not a current smoker.              Induction-     Postoperative Plan- .   Monitoring Plan - Monitoring plan - standard ASA monitoring          Informed Consent- Anesthetic plan and risks discussed with patient.        NPO Status:  Vitals Value Taken Time   Date of last liquid 06/04/25 06/04/25 12:26   Time of last liquid 0900 06/04/25 12:26   Date of last solid 06/03/25 06/04/25 12:26   Time of last solid 2000 06/04/25 12:26

## 2025-06-04 NOTE — ANESTHESIA POSTPROCEDURE EVALUATION
Post-Op Assessment Note    CV Status:  Stable    Pain management: adequate       Mental Status:  Alert and awake   Hydration Status:  Euvolemic   PONV Controlled:  Controlled   Airway Patency:  Patent     Post Op Vitals Reviewed: Yes    No anethesia notable event occurred.    Staff: Anesthesiologist           Last Filed PACU Vitals:  Vitals Value Taken Time   Temp 97.2 °F (36.2 °C) 06/04/25 15:40   Pulse 52 06/04/25 15:40   /72 06/04/25 15:40   Resp 16 06/04/25 15:40   SpO2 99 % 06/04/25 15:40       Modified Latisha:     Vitals Value Taken Time   Activity 2 06/04/25 15:23   Respiration 2 06/04/25 15:23   Circulation 2 06/04/25 15:23   Consciousness 2 06/04/25 15:23   Oxygen Saturation 2 06/04/25 15:23     Modified Latisha Score: 10             no

## 2025-06-04 NOTE — ANESTHESIA POSTPROCEDURE EVALUATION
Post-Op Assessment Note    CV Status:  Stable  Pain Score: 0    Pain management: adequate       Mental Status:  Alert and awake   Hydration Status:  Euvolemic   PONV Controlled:  Controlled   Airway Patency:  Patent     Post Op Vitals Reviewed: Yes    No anethesia notable event occurred.    Staff: CRNA         Last Filed PACU Vitals:  Vitals Value Taken Time   Temp 97.7 °F (36.5 °C) 06/04/25 15:08   Pulse 59 06/04/25 15:08   /58 06/04/25 15:08   Resp 22 06/04/25 15:08   SpO2

## 2025-06-04 NOTE — INTERVAL H&P NOTE
H&P reviewed. After examining the patient I find no changes in the patients condition since the H&P had been written.    Vitals:    06/04/25 1232   BP: 150/70   Pulse: 56   Resp: 18   Temp: (!) 97.2 °F (36.2 °C)   SpO2: 97%     Proceed with spaceOAR and Gold fiducial marker placement.     Prakash Khan MD   [facility]   [facility]

## 2025-06-04 NOTE — OP NOTE
OPERATIVE REPORT  PATIENT NAME: Henry Hickman    :  1940  MRN: [id number]    Surgeons and Role:  Surgeons and Role:     * Prakash Khan MD - Primary    Preop Diagnosis:  Prostate cancer    Post-Op Diagnosis Codes:     * Prostate cancer (HCC) [C61]    Postop Diagnosis:   As above    Procedure(s):  Transrectal ultrasound   Transperineal placement of gold fiducial markers   Transperineal placement of spaceOAR hydrogel    Specimen(s):  None    Estimated Blood Loss:   Minimal    Drains:  None    Anesthesia Type:   IV Sedation with Anesthesia    Complications:   None    Patient Disposition:  PACU     History/Indications:   Henry Hickman is a 85 y.o. male who presents today for transrectal ultrasound-guided placement of SpaceOAR gel. He has met with radiation oncology and has elected to proceed with primary radiation therapy; SpaceOAR placement has been recommended to minimize the possible rectal margin and associated toxicity. The risks associated with the procedure were discussed with the patient, and informed consent was obtained    Findings:    Prostate visualized. Three Golds seeds were placed, two on the right lobe (base and apex) and one in the left mid gland. Uneventful placement of SpaceOAR-Gualberto gel.     Procedure:  The patient was brought to the procedure room and placed in the lithotomy position on the procedure table, with the legs supported by stirrups. Anesthesia was induced.     The scrotum was reflected off the perineum with temporary dressing, and the perineum was prepped with betadine. The transrectal ultrasound probe was secured on the stepper unit and placed in the rectal vault, giving excellent anatomic detail of the prostate gland. There was appropriate prerectal space and normal findings to proceed with SpaceOAR placement.    10 cc of 1% lidocaine were injected into the perineal skin. 10 cc of 1% lidocaine were then administered at the levator musculature and the prostate apex bilaterally  under ultrasound guidance for additional local anesthetic.      The 3 gold seeds were then sequentially introduced percutaneously under ultrasound guidance and placed per protocol at the right base and apex, and on right midgland.    The hydrogel (SpaceOAR-Gualberto) kit was then opened and the material prepared per routine. The delivery needle was easily introduced to the midline position of the mid-prostate below Denonvillier's fascia under ultrasound guidance, with the correct needle position for hydrogel placement confirmed on axial and sagittal imaging, seeing appropriate tissue plane separation on saline instillation. An additional ~2 cc of 1% lidocaine was applied to provide anesthesia to the periprostatic and prerectal tissues. The hydrogel was then administered over 15 seconds under ultrasound guidance, demonstrating correct placement and creating the anticipated distance between the prostate and rectum. The needle was removed and the patient tolerated this well under local anaesthesia.         The patient was awoken from anesthesia and transported to the recovery room without incident.     Prakash RUSSELL MD, performed the entire procedure as the primary attending surgeon.    Prakash Khan MD   [facility]   [facility]

## 2025-06-12 ENCOUNTER — TELEPHONE (OUTPATIENT)
Dept: RADIATION ONCOLOGY | Facility: CLINIC | Age: 85
End: 2025-06-12

## 2025-06-12 ENCOUNTER — APPOINTMENT (OUTPATIENT)
Dept: RADIATION ONCOLOGY | Facility: CLINIC | Age: 85
End: 2025-06-12
Attending: STUDENT IN AN ORGANIZED HEALTH CARE EDUCATION/TRAINING PROGRAM
Payer: COMMERCIAL

## 2025-06-12 DIAGNOSIS — C61 PROSTATE CANCER (HCC): Primary | ICD-10-CM

## 2025-06-12 PROCEDURE — 77334 RADIATION TREATMENT AID(S): CPT | Performed by: STUDENT IN AN ORGANIZED HEALTH CARE EDUCATION/TRAINING PROGRAM

## 2025-06-12 NOTE — TELEPHONE ENCOUNTER
Left VM noting PET Scan scheduled for 6/24/25 at 7am at the [facility] as well as details regarding the actual apt.

## 2025-06-12 NOTE — TELEPHONE ENCOUNTER
Spoke with pt to establish what days and times did not work for a PET scan ordered by provider.  PT expressed no issues with day, time, or location.

## 2025-06-16 ENCOUNTER — HOSPITAL ENCOUNTER (OUTPATIENT)
Dept: RADIOLOGY | Age: 85
Discharge: HOME | End: 2025-06-16
Attending: STUDENT IN AN ORGANIZED HEALTH CARE EDUCATION/TRAINING PROGRAM
Payer: COMMERCIAL

## 2025-06-16 DIAGNOSIS — C61 PROSTATE CANCER (HCC): ICD-10-CM

## 2025-06-16 DIAGNOSIS — Z85.46 PERSONAL HISTORY OF MALIGNANT NEOPLASM OF PROSTATE: ICD-10-CM

## 2025-06-16 PROCEDURE — A9596 HB ILLUCCIX - GA 68 PSMA -11, PER MCI: HCPCS

## 2025-06-16 PROCEDURE — 78815 PET IMAGE W/CT SKULL-THIGH: CPT

## 2025-06-18 ENCOUNTER — TELEPHONE (OUTPATIENT)
Age: 85
End: 2025-06-18

## 2025-06-18 NOTE — TELEPHONE ENCOUNTER
Patient calling in to inform that his PET scan had been moved up to 6/16, he would like a call back at [phone] once Dr Barrios has reviewed these results.

## 2025-06-19 ENCOUNTER — TELEPHONE (OUTPATIENT)
Age: 85
End: 2025-06-19

## 2025-06-19 PROCEDURE — 77301 RADIOTHERAPY DOSE PLAN IMRT: CPT | Performed by: STUDENT IN AN ORGANIZED HEALTH CARE EDUCATION/TRAINING PROGRAM

## 2025-06-19 PROCEDURE — 77338 DESIGN MLC DEVICE FOR IMRT: CPT | Performed by: STUDENT IN AN ORGANIZED HEALTH CARE EDUCATION/TRAINING PROGRAM

## 2025-06-19 PROCEDURE — 77300 RADIATION THERAPY DOSE PLAN: CPT | Performed by: STUDENT IN AN ORGANIZED HEALTH CARE EDUCATION/TRAINING PROGRAM

## 2025-06-19 NOTE — TELEPHONE ENCOUNTER
Henry calling in stating he is returning a phone call regarding scheduling his RT. Attempt made to transfer to office with no answer. Please call Henry any time tomorrow at [phone]. Thank you!

## 2025-06-25 ENCOUNTER — APPOINTMENT (OUTPATIENT)
Dept: RADIATION ONCOLOGY | Facility: CLINIC | Age: 85
End: 2025-06-25
Attending: STUDENT IN AN ORGANIZED HEALTH CARE EDUCATION/TRAINING PROGRAM
Payer: COMMERCIAL

## 2025-06-25 LAB
RAD ONC ARIA COURSE FIRST TREATMENT DATE: NORMAL
RAD ONC ARIA COURSE ID: NORMAL
RAD ONC ARIA COURSE INTENT: NORMAL
RAD ONC ARIA COURSE LAST TREATMENT DATE: NORMAL
RAD ONC ARIA COURSE SESSION NUMBER: 1
RAD ONC ARIA COURSE TREATMENT ELAPSED DAYS: 0
RAD ONC ARIA PLAN FRACTIONS TREATED TO DATE: 1
RAD ONC ARIA PLAN ID: NORMAL
RAD ONC ARIA PLAN NAME: NORMAL
RAD ONC ARIA PLAN PRESCRIBED DOSE PER FRACTION: 2.5 GY
RAD ONC ARIA PLAN PRIMARY REFERENCE POINT: NORMAL
RAD ONC ARIA PLAN TOTAL FRACTIONS PRESCRIBED: 28
RAD ONC ARIA PLAN TOTAL PRESCRIBED DOSE: 7000 CGY
RAD ONC ARIA REFERENCE POINT DOSAGE GIVEN TO DATE: 2.5 GY
RAD ONC ARIA REFERENCE POINT ID: NORMAL
RAD ONC ARIA REFERENCE POINT SESSION DOSAGE GIVEN: 2.5 GY

## 2025-06-25 PROCEDURE — 77385 HB NTSTY MODUL RAD TX DLVR SMPL: CPT | Performed by: STUDENT IN AN ORGANIZED HEALTH CARE EDUCATION/TRAINING PROGRAM

## 2025-06-25 PROCEDURE — 77387 GUIDANCE FOR RADJ TX DLVR: CPT | Performed by: STUDENT IN AN ORGANIZED HEALTH CARE EDUCATION/TRAINING PROGRAM

## 2025-06-25 PROCEDURE — 77427 RADIATION TX MANAGEMENT X5: CPT | Performed by: STUDENT IN AN ORGANIZED HEALTH CARE EDUCATION/TRAINING PROGRAM

## 2025-06-26 LAB
RAD ONC ARIA COURSE FIRST TREATMENT DATE: NORMAL
RAD ONC ARIA COURSE ID: NORMAL
RAD ONC ARIA COURSE INTENT: NORMAL
RAD ONC ARIA COURSE LAST TREATMENT DATE: NORMAL
RAD ONC ARIA COURSE SESSION NUMBER: 2
RAD ONC ARIA COURSE TREATMENT ELAPSED DAYS: 1
RAD ONC ARIA PLAN FRACTIONS TREATED TO DATE: 2
RAD ONC ARIA PLAN ID: NORMAL
RAD ONC ARIA PLAN NAME: NORMAL
RAD ONC ARIA PLAN PRESCRIBED DOSE PER FRACTION: 2.5 GY
RAD ONC ARIA PLAN PRIMARY REFERENCE POINT: NORMAL
RAD ONC ARIA PLAN TOTAL FRACTIONS PRESCRIBED: 28
RAD ONC ARIA PLAN TOTAL PRESCRIBED DOSE: 7000 CGY
RAD ONC ARIA REFERENCE POINT DOSAGE GIVEN TO DATE: 5 GY
RAD ONC ARIA REFERENCE POINT ID: NORMAL
RAD ONC ARIA REFERENCE POINT SESSION DOSAGE GIVEN: 2.5 GY

## 2025-06-26 PROCEDURE — 77385 HB NTSTY MODUL RAD TX DLVR SMPL: CPT | Performed by: STUDENT IN AN ORGANIZED HEALTH CARE EDUCATION/TRAINING PROGRAM

## 2025-06-26 PROCEDURE — 77387 GUIDANCE FOR RADJ TX DLVR: CPT | Performed by: STUDENT IN AN ORGANIZED HEALTH CARE EDUCATION/TRAINING PROGRAM

## 2025-06-27 LAB
RAD ONC ARIA COURSE FIRST TREATMENT DATE: NORMAL
RAD ONC ARIA COURSE ID: NORMAL
RAD ONC ARIA COURSE INTENT: NORMAL
RAD ONC ARIA COURSE LAST TREATMENT DATE: NORMAL
RAD ONC ARIA COURSE SESSION NUMBER: 3
RAD ONC ARIA COURSE TREATMENT ELAPSED DAYS: 2
RAD ONC ARIA PLAN FRACTIONS TREATED TO DATE: 3
RAD ONC ARIA PLAN ID: NORMAL
RAD ONC ARIA PLAN NAME: NORMAL
RAD ONC ARIA PLAN PRESCRIBED DOSE PER FRACTION: 2.5 GY
RAD ONC ARIA PLAN PRIMARY REFERENCE POINT: NORMAL
RAD ONC ARIA PLAN TOTAL FRACTIONS PRESCRIBED: 28
RAD ONC ARIA PLAN TOTAL PRESCRIBED DOSE: 7000 CGY
RAD ONC ARIA REFERENCE POINT DOSAGE GIVEN TO DATE: 7.5 GY
RAD ONC ARIA REFERENCE POINT ID: NORMAL
RAD ONC ARIA REFERENCE POINT SESSION DOSAGE GIVEN: 2.5 GY

## 2025-06-27 PROCEDURE — 77385 HB NTSTY MODUL RAD TX DLVR SMPL: CPT | Performed by: STUDENT IN AN ORGANIZED HEALTH CARE EDUCATION/TRAINING PROGRAM

## 2025-06-27 PROCEDURE — 77387 GUIDANCE FOR RADJ TX DLVR: CPT | Performed by: STUDENT IN AN ORGANIZED HEALTH CARE EDUCATION/TRAINING PROGRAM

## 2025-06-30 LAB
RAD ONC ARIA COURSE FIRST TREATMENT DATE: NORMAL
RAD ONC ARIA COURSE ID: NORMAL
RAD ONC ARIA COURSE INTENT: NORMAL
RAD ONC ARIA COURSE LAST TREATMENT DATE: NORMAL
RAD ONC ARIA COURSE SESSION NUMBER: 4
RAD ONC ARIA COURSE TREATMENT ELAPSED DAYS: 5
RAD ONC ARIA PLAN FRACTIONS TREATED TO DATE: 4
RAD ONC ARIA PLAN ID: NORMAL
RAD ONC ARIA PLAN NAME: NORMAL
RAD ONC ARIA PLAN PRESCRIBED DOSE PER FRACTION: 2.5 GY
RAD ONC ARIA PLAN PRIMARY REFERENCE POINT: NORMAL
RAD ONC ARIA PLAN TOTAL FRACTIONS PRESCRIBED: 28
RAD ONC ARIA PLAN TOTAL PRESCRIBED DOSE: 7000 CGY
RAD ONC ARIA REFERENCE POINT DOSAGE GIVEN TO DATE: 10 GY
RAD ONC ARIA REFERENCE POINT ID: NORMAL
RAD ONC ARIA REFERENCE POINT SESSION DOSAGE GIVEN: 2.5 GY

## 2025-06-30 PROCEDURE — 77387 GUIDANCE FOR RADJ TX DLVR: CPT | Performed by: STUDENT IN AN ORGANIZED HEALTH CARE EDUCATION/TRAINING PROGRAM

## 2025-06-30 PROCEDURE — 77385 HB NTSTY MODUL RAD TX DLVR SMPL: CPT | Performed by: STUDENT IN AN ORGANIZED HEALTH CARE EDUCATION/TRAINING PROGRAM

## 2025-07-01 ENCOUNTER — APPOINTMENT (OUTPATIENT)
Dept: RADIATION ONCOLOGY | Facility: CLINIC | Age: 85
End: 2025-07-01
Attending: STUDENT IN AN ORGANIZED HEALTH CARE EDUCATION/TRAINING PROGRAM
Payer: COMMERCIAL

## 2025-07-01 LAB
RAD ONC ARIA COURSE FIRST TREATMENT DATE: NORMAL
RAD ONC ARIA COURSE ID: NORMAL
RAD ONC ARIA COURSE INTENT: NORMAL
RAD ONC ARIA COURSE LAST TREATMENT DATE: NORMAL
RAD ONC ARIA COURSE SESSION NUMBER: 5
RAD ONC ARIA COURSE TREATMENT ELAPSED DAYS: 6
RAD ONC ARIA PLAN FRACTIONS TREATED TO DATE: 5
RAD ONC ARIA PLAN ID: NORMAL
RAD ONC ARIA PLAN NAME: NORMAL
RAD ONC ARIA PLAN PRESCRIBED DOSE PER FRACTION: 2.5 GY
RAD ONC ARIA PLAN PRIMARY REFERENCE POINT: NORMAL
RAD ONC ARIA PLAN TOTAL FRACTIONS PRESCRIBED: 28
RAD ONC ARIA PLAN TOTAL PRESCRIBED DOSE: 7000 CGY
RAD ONC ARIA REFERENCE POINT DOSAGE GIVEN TO DATE: 12.5 GY
RAD ONC ARIA REFERENCE POINT ID: NORMAL
RAD ONC ARIA REFERENCE POINT SESSION DOSAGE GIVEN: 2.5 GY

## 2025-07-01 PROCEDURE — 77387 GUIDANCE FOR RADJ TX DLVR: CPT | Performed by: STUDENT IN AN ORGANIZED HEALTH CARE EDUCATION/TRAINING PROGRAM

## 2025-07-01 PROCEDURE — 77336 RADIATION PHYSICS CONSULT: CPT | Performed by: STUDENT IN AN ORGANIZED HEALTH CARE EDUCATION/TRAINING PROGRAM

## 2025-07-01 PROCEDURE — 77385 HB NTSTY MODUL RAD TX DLVR SMPL: CPT | Performed by: STUDENT IN AN ORGANIZED HEALTH CARE EDUCATION/TRAINING PROGRAM

## 2025-07-02 ENCOUNTER — APPOINTMENT (OUTPATIENT)
Dept: RADIATION ONCOLOGY | Facility: CLINIC | Age: 85
End: 2025-07-02
Attending: STUDENT IN AN ORGANIZED HEALTH CARE EDUCATION/TRAINING PROGRAM
Payer: COMMERCIAL

## 2025-07-02 LAB
RAD ONC ARIA COURSE FIRST TREATMENT DATE: NORMAL
RAD ONC ARIA COURSE ID: NORMAL
RAD ONC ARIA COURSE INTENT: NORMAL
RAD ONC ARIA COURSE LAST TREATMENT DATE: NORMAL
RAD ONC ARIA COURSE SESSION NUMBER: 6
RAD ONC ARIA COURSE TREATMENT ELAPSED DAYS: 7
RAD ONC ARIA PLAN FRACTIONS TREATED TO DATE: 6
RAD ONC ARIA PLAN ID: NORMAL
RAD ONC ARIA PLAN NAME: NORMAL
RAD ONC ARIA PLAN PRESCRIBED DOSE PER FRACTION: 2.5 GY
RAD ONC ARIA PLAN PRIMARY REFERENCE POINT: NORMAL
RAD ONC ARIA PLAN TOTAL FRACTIONS PRESCRIBED: 28
RAD ONC ARIA PLAN TOTAL PRESCRIBED DOSE: 7000 CGY
RAD ONC ARIA REFERENCE POINT DOSAGE GIVEN TO DATE: 15 GY
RAD ONC ARIA REFERENCE POINT ID: NORMAL
RAD ONC ARIA REFERENCE POINT SESSION DOSAGE GIVEN: 2.5 GY

## 2025-07-02 PROCEDURE — 77385 HB NTSTY MODUL RAD TX DLVR SMPL: CPT | Performed by: STUDENT IN AN ORGANIZED HEALTH CARE EDUCATION/TRAINING PROGRAM

## 2025-07-02 PROCEDURE — 77427 RADIATION TX MANAGEMENT X5: CPT | Performed by: STUDENT IN AN ORGANIZED HEALTH CARE EDUCATION/TRAINING PROGRAM

## 2025-07-02 PROCEDURE — 77387 GUIDANCE FOR RADJ TX DLVR: CPT | Performed by: STUDENT IN AN ORGANIZED HEALTH CARE EDUCATION/TRAINING PROGRAM

## 2025-07-03 ENCOUNTER — APPOINTMENT (OUTPATIENT)
Dept: RADIATION ONCOLOGY | Facility: CLINIC | Age: 85
End: 2025-07-03
Attending: STUDENT IN AN ORGANIZED HEALTH CARE EDUCATION/TRAINING PROGRAM
Payer: COMMERCIAL

## 2025-07-03 ENCOUNTER — APPOINTMENT (OUTPATIENT)
Dept: RADIATION ONCOLOGY | Facility: CLINIC | Age: 85
End: 2025-07-03
Payer: COMMERCIAL

## 2025-07-03 LAB
RAD ONC ARIA COURSE FIRST TREATMENT DATE: NORMAL
RAD ONC ARIA COURSE ID: NORMAL
RAD ONC ARIA COURSE INTENT: NORMAL
RAD ONC ARIA COURSE LAST TREATMENT DATE: NORMAL
RAD ONC ARIA COURSE SESSION NUMBER: 7
RAD ONC ARIA COURSE TREATMENT ELAPSED DAYS: 8
RAD ONC ARIA PLAN FRACTIONS TREATED TO DATE: 7
RAD ONC ARIA PLAN ID: NORMAL
RAD ONC ARIA PLAN NAME: NORMAL
RAD ONC ARIA PLAN PRESCRIBED DOSE PER FRACTION: 2.5 GY
RAD ONC ARIA PLAN PRIMARY REFERENCE POINT: NORMAL
RAD ONC ARIA PLAN TOTAL FRACTIONS PRESCRIBED: 28
RAD ONC ARIA PLAN TOTAL PRESCRIBED DOSE: 7000 CGY
RAD ONC ARIA REFERENCE POINT DOSAGE GIVEN TO DATE: 17.5 GY
RAD ONC ARIA REFERENCE POINT ID: NORMAL
RAD ONC ARIA REFERENCE POINT SESSION DOSAGE GIVEN: 2.5 GY

## 2025-07-03 PROCEDURE — 77387 GUIDANCE FOR RADJ TX DLVR: CPT | Performed by: STUDENT IN AN ORGANIZED HEALTH CARE EDUCATION/TRAINING PROGRAM

## 2025-07-03 PROCEDURE — 77385 HB NTSTY MODUL RAD TX DLVR SMPL: CPT | Performed by: STUDENT IN AN ORGANIZED HEALTH CARE EDUCATION/TRAINING PROGRAM

## 2025-07-07 ENCOUNTER — APPOINTMENT (OUTPATIENT)
Dept: RADIATION ONCOLOGY | Facility: CLINIC | Age: 85
End: 2025-07-07
Attending: STUDENT IN AN ORGANIZED HEALTH CARE EDUCATION/TRAINING PROGRAM
Payer: COMMERCIAL

## 2025-07-07 LAB
RAD ONC ARIA COURSE FIRST TREATMENT DATE: NORMAL
RAD ONC ARIA COURSE ID: NORMAL
RAD ONC ARIA COURSE INTENT: NORMAL
RAD ONC ARIA COURSE LAST TREATMENT DATE: NORMAL
RAD ONC ARIA COURSE SESSION NUMBER: 8
RAD ONC ARIA COURSE TREATMENT ELAPSED DAYS: 12
RAD ONC ARIA PLAN FRACTIONS TREATED TO DATE: 8
RAD ONC ARIA PLAN ID: NORMAL
RAD ONC ARIA PLAN NAME: NORMAL
RAD ONC ARIA PLAN PRESCRIBED DOSE PER FRACTION: 2.5 GY
RAD ONC ARIA PLAN PRIMARY REFERENCE POINT: NORMAL
RAD ONC ARIA PLAN TOTAL FRACTIONS PRESCRIBED: 28
RAD ONC ARIA PLAN TOTAL PRESCRIBED DOSE: 7000 CGY
RAD ONC ARIA REFERENCE POINT DOSAGE GIVEN TO DATE: 20 GY
RAD ONC ARIA REFERENCE POINT ID: NORMAL
RAD ONC ARIA REFERENCE POINT SESSION DOSAGE GIVEN: 2.5 GY

## 2025-07-07 PROCEDURE — 77385 HB NTSTY MODUL RAD TX DLVR SMPL: CPT | Performed by: STUDENT IN AN ORGANIZED HEALTH CARE EDUCATION/TRAINING PROGRAM

## 2025-07-07 PROCEDURE — 77387 GUIDANCE FOR RADJ TX DLVR: CPT | Performed by: STUDENT IN AN ORGANIZED HEALTH CARE EDUCATION/TRAINING PROGRAM

## 2025-07-08 ENCOUNTER — APPOINTMENT (OUTPATIENT)
Dept: RADIATION ONCOLOGY | Facility: CLINIC | Age: 85
End: 2025-07-08
Attending: STUDENT IN AN ORGANIZED HEALTH CARE EDUCATION/TRAINING PROGRAM
Payer: COMMERCIAL

## 2025-07-08 LAB
RAD ONC ARIA COURSE FIRST TREATMENT DATE: NORMAL
RAD ONC ARIA COURSE ID: NORMAL
RAD ONC ARIA COURSE INTENT: NORMAL
RAD ONC ARIA COURSE LAST TREATMENT DATE: NORMAL
RAD ONC ARIA COURSE SESSION NUMBER: 9
RAD ONC ARIA COURSE TREATMENT ELAPSED DAYS: 13
RAD ONC ARIA PLAN FRACTIONS TREATED TO DATE: 9
RAD ONC ARIA PLAN ID: NORMAL
RAD ONC ARIA PLAN NAME: NORMAL
RAD ONC ARIA PLAN PRESCRIBED DOSE PER FRACTION: 2.5 GY
RAD ONC ARIA PLAN PRIMARY REFERENCE POINT: NORMAL
RAD ONC ARIA PLAN TOTAL FRACTIONS PRESCRIBED: 28
RAD ONC ARIA PLAN TOTAL PRESCRIBED DOSE: 7000 CGY
RAD ONC ARIA REFERENCE POINT DOSAGE GIVEN TO DATE: 22.5 GY
RAD ONC ARIA REFERENCE POINT ID: NORMAL
RAD ONC ARIA REFERENCE POINT SESSION DOSAGE GIVEN: 2.5 GY

## 2025-07-08 PROCEDURE — 77385 HB NTSTY MODUL RAD TX DLVR SMPL: CPT | Performed by: STUDENT IN AN ORGANIZED HEALTH CARE EDUCATION/TRAINING PROGRAM

## 2025-07-08 PROCEDURE — 77387 GUIDANCE FOR RADJ TX DLVR: CPT | Performed by: STUDENT IN AN ORGANIZED HEALTH CARE EDUCATION/TRAINING PROGRAM

## 2025-07-09 ENCOUNTER — APPOINTMENT (OUTPATIENT)
Dept: RADIATION ONCOLOGY | Facility: CLINIC | Age: 85
End: 2025-07-09
Attending: STUDENT IN AN ORGANIZED HEALTH CARE EDUCATION/TRAINING PROGRAM
Payer: COMMERCIAL

## 2025-07-09 LAB
RAD ONC ARIA COURSE FIRST TREATMENT DATE: NORMAL
RAD ONC ARIA COURSE ID: NORMAL
RAD ONC ARIA COURSE INTENT: NORMAL
RAD ONC ARIA COURSE LAST TREATMENT DATE: NORMAL
RAD ONC ARIA COURSE SESSION NUMBER: 10
RAD ONC ARIA COURSE TREATMENT ELAPSED DAYS: 14
RAD ONC ARIA PLAN FRACTIONS TREATED TO DATE: 10
RAD ONC ARIA PLAN ID: NORMAL
RAD ONC ARIA PLAN NAME: NORMAL
RAD ONC ARIA PLAN PRESCRIBED DOSE PER FRACTION: 2.5 GY
RAD ONC ARIA PLAN PRIMARY REFERENCE POINT: NORMAL
RAD ONC ARIA PLAN TOTAL FRACTIONS PRESCRIBED: 28
RAD ONC ARIA PLAN TOTAL PRESCRIBED DOSE: 7000 CGY
RAD ONC ARIA REFERENCE POINT DOSAGE GIVEN TO DATE: 25 GY
RAD ONC ARIA REFERENCE POINT ID: NORMAL
RAD ONC ARIA REFERENCE POINT SESSION DOSAGE GIVEN: 2.5 GY

## 2025-07-09 PROCEDURE — 77385 HB NTSTY MODUL RAD TX DLVR SMPL: CPT | Performed by: INTERNAL MEDICINE

## 2025-07-09 PROCEDURE — 77336 RADIATION PHYSICS CONSULT: CPT | Performed by: STUDENT IN AN ORGANIZED HEALTH CARE EDUCATION/TRAINING PROGRAM

## 2025-07-09 PROCEDURE — 77387 GUIDANCE FOR RADJ TX DLVR: CPT | Performed by: INTERNAL MEDICINE

## 2025-07-10 ENCOUNTER — APPOINTMENT (OUTPATIENT)
Dept: RADIATION ONCOLOGY | Facility: CLINIC | Age: 85
End: 2025-07-10
Attending: STUDENT IN AN ORGANIZED HEALTH CARE EDUCATION/TRAINING PROGRAM
Payer: COMMERCIAL

## 2025-07-10 ENCOUNTER — APPOINTMENT (OUTPATIENT)
Dept: RADIATION ONCOLOGY | Facility: CLINIC | Age: 85
End: 2025-07-10
Payer: COMMERCIAL

## 2025-07-10 LAB
RAD ONC ARIA COURSE FIRST TREATMENT DATE: NORMAL
RAD ONC ARIA COURSE ID: NORMAL
RAD ONC ARIA COURSE INTENT: NORMAL
RAD ONC ARIA COURSE LAST TREATMENT DATE: NORMAL
RAD ONC ARIA COURSE SESSION NUMBER: 11
RAD ONC ARIA COURSE TREATMENT ELAPSED DAYS: 15
RAD ONC ARIA PLAN FRACTIONS TREATED TO DATE: 11
RAD ONC ARIA PLAN ID: NORMAL
RAD ONC ARIA PLAN NAME: NORMAL
RAD ONC ARIA PLAN PRESCRIBED DOSE PER FRACTION: 2.5 GY
RAD ONC ARIA PLAN PRIMARY REFERENCE POINT: NORMAL
RAD ONC ARIA PLAN TOTAL FRACTIONS PRESCRIBED: 28
RAD ONC ARIA PLAN TOTAL PRESCRIBED DOSE: 7000 CGY
RAD ONC ARIA REFERENCE POINT DOSAGE GIVEN TO DATE: 27.5 GY
RAD ONC ARIA REFERENCE POINT ID: NORMAL
RAD ONC ARIA REFERENCE POINT SESSION DOSAGE GIVEN: 2.5 GY

## 2025-07-10 PROCEDURE — 77427 RADIATION TX MANAGEMENT X5: CPT | Performed by: STUDENT IN AN ORGANIZED HEALTH CARE EDUCATION/TRAINING PROGRAM

## 2025-07-10 PROCEDURE — 77385 HB NTSTY MODUL RAD TX DLVR SMPL: CPT | Performed by: INTERNAL MEDICINE

## 2025-07-10 PROCEDURE — 77387 GUIDANCE FOR RADJ TX DLVR: CPT | Performed by: INTERNAL MEDICINE

## 2025-07-11 ENCOUNTER — APPOINTMENT (OUTPATIENT)
Dept: RADIATION ONCOLOGY | Facility: CLINIC | Age: 85
End: 2025-07-11
Attending: STUDENT IN AN ORGANIZED HEALTH CARE EDUCATION/TRAINING PROGRAM
Payer: COMMERCIAL

## 2025-07-11 LAB
RAD ONC ARIA COURSE FIRST TREATMENT DATE: NORMAL
RAD ONC ARIA COURSE ID: NORMAL
RAD ONC ARIA COURSE INTENT: NORMAL
RAD ONC ARIA COURSE LAST TREATMENT DATE: NORMAL
RAD ONC ARIA COURSE SESSION NUMBER: 12
RAD ONC ARIA COURSE TREATMENT ELAPSED DAYS: 16
RAD ONC ARIA PLAN FRACTIONS TREATED TO DATE: 12
RAD ONC ARIA PLAN ID: NORMAL
RAD ONC ARIA PLAN NAME: NORMAL
RAD ONC ARIA PLAN PRESCRIBED DOSE PER FRACTION: 2.5 GY
RAD ONC ARIA PLAN PRIMARY REFERENCE POINT: NORMAL
RAD ONC ARIA PLAN TOTAL FRACTIONS PRESCRIBED: 28
RAD ONC ARIA PLAN TOTAL PRESCRIBED DOSE: 7000 CGY
RAD ONC ARIA REFERENCE POINT DOSAGE GIVEN TO DATE: 30 GY
RAD ONC ARIA REFERENCE POINT ID: NORMAL
RAD ONC ARIA REFERENCE POINT SESSION DOSAGE GIVEN: 2.5 GY

## 2025-07-11 PROCEDURE — 77385 HB NTSTY MODUL RAD TX DLVR SMPL: CPT | Performed by: STUDENT IN AN ORGANIZED HEALTH CARE EDUCATION/TRAINING PROGRAM

## 2025-07-11 PROCEDURE — 77387 GUIDANCE FOR RADJ TX DLVR: CPT | Performed by: STUDENT IN AN ORGANIZED HEALTH CARE EDUCATION/TRAINING PROGRAM

## 2025-07-14 ENCOUNTER — PATIENT OUTREACH (OUTPATIENT)
Dept: HEMATOLOGY ONCOLOGY | Facility: CLINIC | Age: 85
End: 2025-07-14

## 2025-07-14 ENCOUNTER — APPOINTMENT (OUTPATIENT)
Dept: RADIATION ONCOLOGY | Facility: CLINIC | Age: 85
End: 2025-07-14
Attending: STUDENT IN AN ORGANIZED HEALTH CARE EDUCATION/TRAINING PROGRAM
Payer: COMMERCIAL

## 2025-07-14 PROCEDURE — 77387 GUIDANCE FOR RADJ TX DLVR: CPT | Performed by: RADIOLOGY

## 2025-07-14 PROCEDURE — 77385 HB NTSTY MODUL RAD TX DLVR SMPL: CPT | Performed by: RADIOLOGY

## 2025-07-14 NOTE — PROGRESS NOTES
I reached out and spoke with Henry to follow up and to review for any new changes in barriers to care and offer supportive services as needed.     Barriers noted previously and outcome of interventions;  none    Reviewed for any new barriers as noted below.    Are you having any side effects from your treatment?Yes, describe: some diarrhea, urinary issues    Are you eating and drinking normally? yes    Have you been experiencing any uncontrolled pain related to your cancer diagnosis? No    If not already established, have needs changed for a palliative care referral? no    Do you have a good support system? Yes     Are you interested in any support groups? No    How are you doing with transportation to your appointments? No    Do you have any questions or concerns regarding your treatment plan? No    Any new financial concerns for your household or medical bills? No    Do you know when your upcoming appointments are? yes  Future Appointments   Date Time Provider Department Center   7/15/2025  8:30 AM [facility]   7/16/2025  8:30 AM [facility]   7/17/2025  8:30 AM [facility]   7/18/2025  8:30 AM Vaughn Barrios MD [facility]   7/18/2025  8:30 AM [facility]   7/21/2025  8:30 AM [facility]   7/22/2025  8:30 AM [facility]   7/23/2025  8:30 AM [facility]   7/24/2025  8:30 AM Vaughn Barrios MD [facility]   7/24/2025  8:30 AM [facility]   7/25/2025  8:30 AM [facility]   7/28/2025  8:30 AM [facility]   7/29/2025  8:30 AM [facility]   7/30/2025  8:30 AM [facility]   7/31/2025  8:30 AM [facility]   8/1/2025   8:30 AM [facility]   8/4/2025  8:30 AM [facility]   9/18/2025  9:40 AM Kolton Mina PA-C [facility]   6/2/2026 10:20 AM Kanu Richardson MD  [facility]   He is overall doing well. He does not have any other questions or concerns at this time.      Based on individual needs I will follow up with them in 3 weeks. I have provided my direct contact information and welcome them to contact me if their needs as discussed above change. They were appreciative for the call.

## 2025-07-15 ENCOUNTER — APPOINTMENT (OUTPATIENT)
Dept: RADIATION ONCOLOGY | Facility: CLINIC | Age: 85
End: 2025-07-15
Attending: STUDENT IN AN ORGANIZED HEALTH CARE EDUCATION/TRAINING PROGRAM
Payer: COMMERCIAL

## 2025-07-15 PROCEDURE — 77385 HB NTSTY MODUL RAD TX DLVR SMPL: CPT | Performed by: RADIOLOGY

## 2025-07-15 PROCEDURE — 77387 GUIDANCE FOR RADJ TX DLVR: CPT | Performed by: RADIOLOGY

## 2025-07-16 ENCOUNTER — APPOINTMENT (OUTPATIENT)
Dept: RADIATION ONCOLOGY | Facility: CLINIC | Age: 85
End: 2025-07-16
Attending: STUDENT IN AN ORGANIZED HEALTH CARE EDUCATION/TRAINING PROGRAM
Payer: COMMERCIAL

## 2025-07-16 PROCEDURE — 77385 HB NTSTY MODUL RAD TX DLVR SMPL: CPT | Performed by: INTERNAL MEDICINE

## 2025-07-16 PROCEDURE — 77387 GUIDANCE FOR RADJ TX DLVR: CPT | Performed by: INTERNAL MEDICINE

## 2025-07-16 PROCEDURE — 77336 RADIATION PHYSICS CONSULT: CPT | Performed by: STUDENT IN AN ORGANIZED HEALTH CARE EDUCATION/TRAINING PROGRAM

## 2025-07-17 ENCOUNTER — APPOINTMENT (OUTPATIENT)
Dept: RADIATION ONCOLOGY | Facility: CLINIC | Age: 85
End: 2025-07-17
Payer: COMMERCIAL

## 2025-07-17 ENCOUNTER — APPOINTMENT (OUTPATIENT)
Dept: RADIATION ONCOLOGY | Facility: CLINIC | Age: 85
End: 2025-07-17
Attending: STUDENT IN AN ORGANIZED HEALTH CARE EDUCATION/TRAINING PROGRAM
Payer: COMMERCIAL

## 2025-07-17 PROCEDURE — 77385 HB NTSTY MODUL RAD TX DLVR SMPL: CPT | Performed by: RADIOLOGY

## 2025-07-17 PROCEDURE — 77427 RADIATION TX MANAGEMENT X5: CPT | Performed by: INTERNAL MEDICINE

## 2025-07-17 PROCEDURE — 77387 GUIDANCE FOR RADJ TX DLVR: CPT | Performed by: RADIOLOGY

## 2025-07-18 ENCOUNTER — APPOINTMENT (OUTPATIENT)
Dept: RADIATION ONCOLOGY | Facility: CLINIC | Age: 85
End: 2025-07-18
Payer: COMMERCIAL

## 2025-07-18 ENCOUNTER — APPOINTMENT (OUTPATIENT)
Dept: RADIATION ONCOLOGY | Facility: CLINIC | Age: 85
End: 2025-07-18
Attending: STUDENT IN AN ORGANIZED HEALTH CARE EDUCATION/TRAINING PROGRAM
Payer: COMMERCIAL

## 2025-07-18 PROCEDURE — 77387 GUIDANCE FOR RADJ TX DLVR: CPT | Performed by: RADIOLOGY

## 2025-07-18 PROCEDURE — 77385 HB NTSTY MODUL RAD TX DLVR SMPL: CPT | Performed by: RADIOLOGY

## 2025-07-21 ENCOUNTER — APPOINTMENT (OUTPATIENT)
Dept: RADIATION ONCOLOGY | Facility: CLINIC | Age: 85
End: 2025-07-21
Attending: STUDENT IN AN ORGANIZED HEALTH CARE EDUCATION/TRAINING PROGRAM
Payer: COMMERCIAL

## 2025-07-21 LAB
RAD ONC ARIA COURSE FIRST TREATMENT DATE: NORMAL
RAD ONC ARIA COURSE ID: NORMAL
RAD ONC ARIA COURSE INTENT: NORMAL
RAD ONC ARIA COURSE LAST TREATMENT DATE: NORMAL
RAD ONC ARIA COURSE SESSION NUMBER: 13
RAD ONC ARIA COURSE SESSION NUMBER: 14
RAD ONC ARIA COURSE SESSION NUMBER: 15
RAD ONC ARIA COURSE SESSION NUMBER: 16
RAD ONC ARIA COURSE SESSION NUMBER: 17
RAD ONC ARIA COURSE SESSION NUMBER: 18
RAD ONC ARIA COURSE TREATMENT ELAPSED DAYS: 23
RAD ONC ARIA COURSE TREATMENT ELAPSED DAYS: 26
RAD ONC ARIA PLAN FRACTIONS TREATED TO DATE: 17
RAD ONC ARIA PLAN FRACTIONS TREATED TO DATE: 18
RAD ONC ARIA PLAN ID: NORMAL
RAD ONC ARIA PLAN NAME: NORMAL
RAD ONC ARIA PLAN PRESCRIBED DOSE PER FRACTION: 2.5 GY
RAD ONC ARIA PLAN PRIMARY REFERENCE POINT: NORMAL
RAD ONC ARIA PLAN TOTAL FRACTIONS PRESCRIBED: 28
RAD ONC ARIA PLAN TOTAL PRESCRIBED DOSE: 7000 CGY
RAD ONC ARIA REFERENCE POINT DOSAGE GIVEN TO DATE: 42.5 GY
RAD ONC ARIA REFERENCE POINT DOSAGE GIVEN TO DATE: 45 GY
RAD ONC ARIA REFERENCE POINT ID: NORMAL
RAD ONC ARIA REFERENCE POINT SESSION DOSAGE GIVEN: 2.5 GY

## 2025-07-21 PROCEDURE — 77387 GUIDANCE FOR RADJ TX DLVR: CPT | Performed by: RADIOLOGY

## 2025-07-21 PROCEDURE — 77385 HB NTSTY MODUL RAD TX DLVR SMPL: CPT | Performed by: RADIOLOGY

## 2025-07-22 ENCOUNTER — APPOINTMENT (OUTPATIENT)
Dept: RADIATION ONCOLOGY | Facility: CLINIC | Age: 85
End: 2025-07-22
Attending: STUDENT IN AN ORGANIZED HEALTH CARE EDUCATION/TRAINING PROGRAM
Payer: COMMERCIAL

## 2025-07-22 LAB
RAD ONC ARIA COURSE FIRST TREATMENT DATE: NORMAL
RAD ONC ARIA COURSE ID: NORMAL
RAD ONC ARIA COURSE INTENT: NORMAL
RAD ONC ARIA COURSE LAST TREATMENT DATE: NORMAL
RAD ONC ARIA COURSE SESSION NUMBER: 19
RAD ONC ARIA COURSE TREATMENT ELAPSED DAYS: 27
RAD ONC ARIA PLAN FRACTIONS TREATED TO DATE: 19
RAD ONC ARIA PLAN ID: NORMAL
RAD ONC ARIA PLAN NAME: NORMAL
RAD ONC ARIA PLAN PRESCRIBED DOSE PER FRACTION: 2.5 GY
RAD ONC ARIA PLAN PRIMARY REFERENCE POINT: NORMAL
RAD ONC ARIA PLAN TOTAL FRACTIONS PRESCRIBED: 28
RAD ONC ARIA PLAN TOTAL PRESCRIBED DOSE: 7000 CGY
RAD ONC ARIA REFERENCE POINT DOSAGE GIVEN TO DATE: 47.5 GY
RAD ONC ARIA REFERENCE POINT ID: NORMAL
RAD ONC ARIA REFERENCE POINT SESSION DOSAGE GIVEN: 2.5 GY

## 2025-07-22 PROCEDURE — 77387 GUIDANCE FOR RADJ TX DLVR: CPT | Performed by: RADIOLOGY

## 2025-07-22 PROCEDURE — 77385 HB NTSTY MODUL RAD TX DLVR SMPL: CPT | Performed by: RADIOLOGY

## 2025-07-23 ENCOUNTER — APPOINTMENT (OUTPATIENT)
Dept: RADIATION ONCOLOGY | Facility: CLINIC | Age: 85
End: 2025-07-23
Attending: STUDENT IN AN ORGANIZED HEALTH CARE EDUCATION/TRAINING PROGRAM
Payer: COMMERCIAL

## 2025-07-23 LAB
RAD ONC ARIA COURSE FIRST TREATMENT DATE: NORMAL
RAD ONC ARIA COURSE ID: NORMAL
RAD ONC ARIA COURSE INTENT: NORMAL
RAD ONC ARIA COURSE LAST TREATMENT DATE: NORMAL
RAD ONC ARIA COURSE SESSION NUMBER: 20
RAD ONC ARIA COURSE TREATMENT ELAPSED DAYS: 28
RAD ONC ARIA PLAN FRACTIONS TREATED TO DATE: 20
RAD ONC ARIA PLAN ID: NORMAL
RAD ONC ARIA PLAN NAME: NORMAL
RAD ONC ARIA PLAN PRESCRIBED DOSE PER FRACTION: 2.5 GY
RAD ONC ARIA PLAN PRIMARY REFERENCE POINT: NORMAL
RAD ONC ARIA PLAN TOTAL FRACTIONS PRESCRIBED: 28
RAD ONC ARIA PLAN TOTAL PRESCRIBED DOSE: 7000 CGY
RAD ONC ARIA REFERENCE POINT DOSAGE GIVEN TO DATE: 50 GY
RAD ONC ARIA REFERENCE POINT ID: NORMAL
RAD ONC ARIA REFERENCE POINT SESSION DOSAGE GIVEN: 2.5 GY

## 2025-07-23 PROCEDURE — 77385 HB NTSTY MODUL RAD TX DLVR SMPL: CPT | Performed by: INTERNAL MEDICINE

## 2025-07-23 PROCEDURE — 77336 RADIATION PHYSICS CONSULT: CPT | Performed by: INTERNAL MEDICINE

## 2025-07-23 PROCEDURE — 77387 GUIDANCE FOR RADJ TX DLVR: CPT | Performed by: INTERNAL MEDICINE

## 2025-07-24 ENCOUNTER — APPOINTMENT (OUTPATIENT)
Dept: RADIATION ONCOLOGY | Facility: CLINIC | Age: 85
End: 2025-07-24
Payer: COMMERCIAL

## 2025-07-24 ENCOUNTER — APPOINTMENT (OUTPATIENT)
Dept: RADIATION ONCOLOGY | Facility: CLINIC | Age: 85
End: 2025-07-24
Attending: STUDENT IN AN ORGANIZED HEALTH CARE EDUCATION/TRAINING PROGRAM
Payer: COMMERCIAL

## 2025-07-24 LAB
RAD ONC ARIA COURSE FIRST TREATMENT DATE: NORMAL
RAD ONC ARIA COURSE ID: NORMAL
RAD ONC ARIA COURSE INTENT: NORMAL
RAD ONC ARIA COURSE LAST TREATMENT DATE: NORMAL
RAD ONC ARIA COURSE SESSION NUMBER: 21
RAD ONC ARIA COURSE TREATMENT ELAPSED DAYS: 29
RAD ONC ARIA PLAN FRACTIONS TREATED TO DATE: 21
RAD ONC ARIA PLAN ID: NORMAL
RAD ONC ARIA PLAN NAME: NORMAL
RAD ONC ARIA PLAN PRESCRIBED DOSE PER FRACTION: 2.5 GY
RAD ONC ARIA PLAN PRIMARY REFERENCE POINT: NORMAL
RAD ONC ARIA PLAN TOTAL FRACTIONS PRESCRIBED: 28
RAD ONC ARIA PLAN TOTAL PRESCRIBED DOSE: 7000 CGY
RAD ONC ARIA REFERENCE POINT DOSAGE GIVEN TO DATE: 52.5 GY
RAD ONC ARIA REFERENCE POINT ID: NORMAL
RAD ONC ARIA REFERENCE POINT SESSION DOSAGE GIVEN: 2.5 GY

## 2025-07-24 PROCEDURE — 77385 HB NTSTY MODUL RAD TX DLVR SMPL: CPT | Performed by: RADIOLOGY

## 2025-07-24 PROCEDURE — 77387 GUIDANCE FOR RADJ TX DLVR: CPT | Performed by: RADIOLOGY

## 2025-07-25 ENCOUNTER — APPOINTMENT (OUTPATIENT)
Dept: RADIATION ONCOLOGY | Facility: CLINIC | Age: 85
End: 2025-07-25
Attending: STUDENT IN AN ORGANIZED HEALTH CARE EDUCATION/TRAINING PROGRAM
Payer: COMMERCIAL

## 2025-07-25 LAB
RAD ONC ARIA COURSE FIRST TREATMENT DATE: NORMAL
RAD ONC ARIA COURSE ID: NORMAL
RAD ONC ARIA COURSE INTENT: NORMAL
RAD ONC ARIA COURSE LAST TREATMENT DATE: NORMAL
RAD ONC ARIA COURSE SESSION NUMBER: 22
RAD ONC ARIA COURSE TREATMENT ELAPSED DAYS: 30
RAD ONC ARIA PLAN FRACTIONS TREATED TO DATE: 22
RAD ONC ARIA PLAN ID: NORMAL
RAD ONC ARIA PLAN NAME: NORMAL
RAD ONC ARIA PLAN PRESCRIBED DOSE PER FRACTION: 2.5 GY
RAD ONC ARIA PLAN PRIMARY REFERENCE POINT: NORMAL
RAD ONC ARIA PLAN TOTAL FRACTIONS PRESCRIBED: 28
RAD ONC ARIA PLAN TOTAL PRESCRIBED DOSE: 7000 CGY
RAD ONC ARIA REFERENCE POINT DOSAGE GIVEN TO DATE: 55 GY
RAD ONC ARIA REFERENCE POINT ID: NORMAL
RAD ONC ARIA REFERENCE POINT SESSION DOSAGE GIVEN: 2.5 GY

## 2025-07-25 PROCEDURE — 77385 HB NTSTY MODUL RAD TX DLVR SMPL: CPT | Performed by: RADIOLOGY

## 2025-07-25 PROCEDURE — 77387 GUIDANCE FOR RADJ TX DLVR: CPT | Performed by: RADIOLOGY

## 2025-07-28 ENCOUNTER — APPOINTMENT (OUTPATIENT)
Dept: RADIATION ONCOLOGY | Facility: CLINIC | Age: 85
End: 2025-07-28
Attending: STUDENT IN AN ORGANIZED HEALTH CARE EDUCATION/TRAINING PROGRAM
Payer: COMMERCIAL

## 2025-07-28 LAB
RAD ONC ARIA COURSE FIRST TREATMENT DATE: NORMAL
RAD ONC ARIA COURSE ID: NORMAL
RAD ONC ARIA COURSE INTENT: NORMAL
RAD ONC ARIA COURSE LAST TREATMENT DATE: NORMAL
RAD ONC ARIA COURSE SESSION NUMBER: 23
RAD ONC ARIA COURSE TREATMENT ELAPSED DAYS: 33
RAD ONC ARIA PLAN FRACTIONS TREATED TO DATE: 23
RAD ONC ARIA PLAN ID: NORMAL
RAD ONC ARIA PLAN NAME: NORMAL
RAD ONC ARIA PLAN PRESCRIBED DOSE PER FRACTION: 2.5 GY
RAD ONC ARIA PLAN PRIMARY REFERENCE POINT: NORMAL
RAD ONC ARIA PLAN TOTAL FRACTIONS PRESCRIBED: 28
RAD ONC ARIA PLAN TOTAL PRESCRIBED DOSE: 7000 CGY
RAD ONC ARIA REFERENCE POINT DOSAGE GIVEN TO DATE: 57.5 GY
RAD ONC ARIA REFERENCE POINT ID: NORMAL
RAD ONC ARIA REFERENCE POINT SESSION DOSAGE GIVEN: 2.5 GY

## 2025-07-28 PROCEDURE — 77385 HB NTSTY MODUL RAD TX DLVR SMPL: CPT | Performed by: RADIOLOGY

## 2025-07-28 PROCEDURE — 77387 GUIDANCE FOR RADJ TX DLVR: CPT | Performed by: RADIOLOGY

## 2025-07-29 ENCOUNTER — APPOINTMENT (OUTPATIENT)
Dept: RADIATION ONCOLOGY | Facility: CLINIC | Age: 85
End: 2025-07-29
Attending: STUDENT IN AN ORGANIZED HEALTH CARE EDUCATION/TRAINING PROGRAM
Payer: COMMERCIAL

## 2025-07-29 LAB
RAD ONC ARIA COURSE FIRST TREATMENT DATE: NORMAL
RAD ONC ARIA COURSE ID: NORMAL
RAD ONC ARIA COURSE INTENT: NORMAL
RAD ONC ARIA COURSE LAST TREATMENT DATE: NORMAL
RAD ONC ARIA COURSE SESSION NUMBER: 24
RAD ONC ARIA COURSE TREATMENT ELAPSED DAYS: 34
RAD ONC ARIA PLAN FRACTIONS TREATED TO DATE: 24
RAD ONC ARIA PLAN ID: NORMAL
RAD ONC ARIA PLAN NAME: NORMAL
RAD ONC ARIA PLAN PRESCRIBED DOSE PER FRACTION: 2.5 GY
RAD ONC ARIA PLAN PRIMARY REFERENCE POINT: NORMAL
RAD ONC ARIA PLAN TOTAL FRACTIONS PRESCRIBED: 28
RAD ONC ARIA PLAN TOTAL PRESCRIBED DOSE: 7000 CGY
RAD ONC ARIA REFERENCE POINT DOSAGE GIVEN TO DATE: 60 GY
RAD ONC ARIA REFERENCE POINT ID: NORMAL
RAD ONC ARIA REFERENCE POINT SESSION DOSAGE GIVEN: 2.5 GY

## 2025-07-29 PROCEDURE — 77387 GUIDANCE FOR RADJ TX DLVR: CPT | Performed by: RADIOLOGY

## 2025-07-29 PROCEDURE — 77385 HB NTSTY MODUL RAD TX DLVR SMPL: CPT | Performed by: RADIOLOGY

## 2025-07-30 ENCOUNTER — APPOINTMENT (OUTPATIENT)
Dept: RADIATION ONCOLOGY | Facility: CLINIC | Age: 85
End: 2025-07-30
Attending: STUDENT IN AN ORGANIZED HEALTH CARE EDUCATION/TRAINING PROGRAM
Payer: COMMERCIAL

## 2025-07-30 LAB
RAD ONC ARIA COURSE FIRST TREATMENT DATE: NORMAL
RAD ONC ARIA COURSE ID: NORMAL
RAD ONC ARIA COURSE INTENT: NORMAL
RAD ONC ARIA COURSE LAST TREATMENT DATE: NORMAL
RAD ONC ARIA COURSE SESSION NUMBER: 25
RAD ONC ARIA COURSE TREATMENT ELAPSED DAYS: 35
RAD ONC ARIA PLAN FRACTIONS TREATED TO DATE: 25
RAD ONC ARIA PLAN ID: NORMAL
RAD ONC ARIA PLAN NAME: NORMAL
RAD ONC ARIA PLAN PRESCRIBED DOSE PER FRACTION: 2.5 GY
RAD ONC ARIA PLAN PRIMARY REFERENCE POINT: NORMAL
RAD ONC ARIA PLAN TOTAL FRACTIONS PRESCRIBED: 28
RAD ONC ARIA PLAN TOTAL PRESCRIBED DOSE: 7000 CGY
RAD ONC ARIA REFERENCE POINT DOSAGE GIVEN TO DATE: 62.5 GY
RAD ONC ARIA REFERENCE POINT ID: NORMAL
RAD ONC ARIA REFERENCE POINT SESSION DOSAGE GIVEN: 2.5 GY

## 2025-07-30 PROCEDURE — 77385 HB NTSTY MODUL RAD TX DLVR SMPL: CPT | Performed by: INTERNAL MEDICINE

## 2025-07-30 PROCEDURE — 77336 RADIATION PHYSICS CONSULT: CPT | Performed by: INTERNAL MEDICINE

## 2025-07-30 PROCEDURE — 77387 GUIDANCE FOR RADJ TX DLVR: CPT | Performed by: INTERNAL MEDICINE

## 2025-07-31 ENCOUNTER — APPOINTMENT (OUTPATIENT)
Dept: RADIATION ONCOLOGY | Facility: CLINIC | Age: 85
End: 2025-07-31
Attending: STUDENT IN AN ORGANIZED HEALTH CARE EDUCATION/TRAINING PROGRAM
Payer: COMMERCIAL

## 2025-07-31 LAB
RAD ONC ARIA COURSE FIRST TREATMENT DATE: NORMAL
RAD ONC ARIA COURSE ID: NORMAL
RAD ONC ARIA COURSE INTENT: NORMAL
RAD ONC ARIA COURSE LAST TREATMENT DATE: NORMAL
RAD ONC ARIA COURSE SESSION NUMBER: 26
RAD ONC ARIA COURSE TREATMENT ELAPSED DAYS: 36
RAD ONC ARIA PLAN FRACTIONS TREATED TO DATE: 26
RAD ONC ARIA PLAN ID: NORMAL
RAD ONC ARIA PLAN NAME: NORMAL
RAD ONC ARIA PLAN PRESCRIBED DOSE PER FRACTION: 2.5 GY
RAD ONC ARIA PLAN PRIMARY REFERENCE POINT: NORMAL
RAD ONC ARIA PLAN TOTAL FRACTIONS PRESCRIBED: 28
RAD ONC ARIA PLAN TOTAL PRESCRIBED DOSE: 7000 CGY
RAD ONC ARIA REFERENCE POINT DOSAGE GIVEN TO DATE: 65 GY
RAD ONC ARIA REFERENCE POINT ID: NORMAL
RAD ONC ARIA REFERENCE POINT SESSION DOSAGE GIVEN: 2.5 GY

## 2025-07-31 PROCEDURE — 77385 HB NTSTY MODUL RAD TX DLVR SMPL: CPT | Performed by: RADIOLOGY

## 2025-07-31 PROCEDURE — 77387 GUIDANCE FOR RADJ TX DLVR: CPT | Performed by: RADIOLOGY

## 2025-08-01 LAB
RAD ONC ARIA COURSE FIRST TREATMENT DATE: NORMAL
RAD ONC ARIA COURSE ID: NORMAL
RAD ONC ARIA COURSE INTENT: NORMAL
RAD ONC ARIA COURSE LAST TREATMENT DATE: NORMAL
RAD ONC ARIA COURSE SESSION NUMBER: 27
RAD ONC ARIA COURSE TREATMENT ELAPSED DAYS: 37
RAD ONC ARIA PLAN FRACTIONS TREATED TO DATE: 27
RAD ONC ARIA PLAN ID: NORMAL
RAD ONC ARIA PLAN NAME: NORMAL
RAD ONC ARIA PLAN PRESCRIBED DOSE PER FRACTION: 2.5 GY
RAD ONC ARIA PLAN PRIMARY REFERENCE POINT: NORMAL
RAD ONC ARIA PLAN TOTAL FRACTIONS PRESCRIBED: 28
RAD ONC ARIA PLAN TOTAL PRESCRIBED DOSE: 7000 CGY
RAD ONC ARIA REFERENCE POINT DOSAGE GIVEN TO DATE: 67.5 GY
RAD ONC ARIA REFERENCE POINT ID: NORMAL
RAD ONC ARIA REFERENCE POINT SESSION DOSAGE GIVEN: 2.5 GY

## 2025-08-04 LAB
RAD ONC ARIA COURSE FIRST TREATMENT DATE: NORMAL
RAD ONC ARIA COURSE ID: NORMAL
RAD ONC ARIA COURSE INTENT: NORMAL
RAD ONC ARIA COURSE LAST TREATMENT DATE: NORMAL
RAD ONC ARIA COURSE SESSION NUMBER: 28
RAD ONC ARIA COURSE TREATMENT ELAPSED DAYS: 40
RAD ONC ARIA PLAN FRACTIONS TREATED TO DATE: 28
RAD ONC ARIA PLAN ID: NORMAL
RAD ONC ARIA PLAN NAME: NORMAL
RAD ONC ARIA PLAN PRESCRIBED DOSE PER FRACTION: 2.5 GY
RAD ONC ARIA PLAN PRIMARY REFERENCE POINT: NORMAL
RAD ONC ARIA PLAN TOTAL FRACTIONS PRESCRIBED: 28
RAD ONC ARIA PLAN TOTAL PRESCRIBED DOSE: 7000 CGY
RAD ONC ARIA REFERENCE POINT DOSAGE GIVEN TO DATE: 70 GY
RAD ONC ARIA REFERENCE POINT ID: NORMAL
RAD ONC ARIA REFERENCE POINT SESSION DOSAGE GIVEN: 2.5 GY

## 2025-08-22 ENCOUNTER — PATIENT OUTREACH (OUTPATIENT)
Dept: HEMATOLOGY ONCOLOGY | Facility: CLINIC | Age: 85
End: 2025-08-22

## (undated) DEVICE — RENTAL PROBE ULTRASOUND DROP IN BK5000

## (undated) DEVICE — MAYO STAND COVER: Brand: CONVERTORS

## (undated) DEVICE — UTILITY MARKER,BLACK WITH LABELS: Brand: DEVON

## (undated) DEVICE — NEEDLE BLUNT 18 G X 1 1/2 W FILTER

## (undated) DEVICE — 3M™ TEGADERM™ TRANSPARENT FILM DRESSING FRAME STYLE, 1628, 6 IN X 8 IN (15 CM X 20 CM), 10/CT 8CT/CASE: Brand: 3M™ TEGADERM™

## (undated) DEVICE — ULTRASOUND GEL STERILE FOIL PK

## (undated) DEVICE — COVER PROBE ECLIPSE

## (undated) DEVICE — SYRINGE 10ML LL

## (undated) DEVICE — NEEDLE SPINAL 22G X 5IN QUINCKE

## (undated) DEVICE — PREP PAD BNS: Brand: CONVERTORS

## (undated) DEVICE — GAUZE SPONGES,16 PLY: Brand: CURITY

## (undated) DEVICE — RAZOR STERILE

## (undated) DEVICE — SPECIMEN CONTAINER STERILE PEEL PACK

## (undated) DEVICE — CHLORAPREP HI-LITE 26ML ORANGE